# Patient Record
Sex: MALE | Race: WHITE | NOT HISPANIC OR LATINO | Employment: OTHER | URBAN - NONMETROPOLITAN AREA
[De-identification: names, ages, dates, MRNs, and addresses within clinical notes are randomized per-mention and may not be internally consistent; named-entity substitution may affect disease eponyms.]

---

## 2018-03-28 ENCOUNTER — PROCEDURE VISIT (OUTPATIENT)
Dept: OTOLARYNGOLOGY | Facility: CLINIC | Age: 73
End: 2018-03-28

## 2018-03-28 ENCOUNTER — OFFICE VISIT (OUTPATIENT)
Dept: OTOLARYNGOLOGY | Facility: CLINIC | Age: 73
End: 2018-03-28

## 2018-03-28 VITALS
HEIGHT: 74 IN | WEIGHT: 212 LBS | TEMPERATURE: 98.8 F | HEART RATE: 76 BPM | SYSTOLIC BLOOD PRESSURE: 150 MMHG | DIASTOLIC BLOOD PRESSURE: 82 MMHG | BODY MASS INDEX: 27.21 KG/M2

## 2018-03-28 DIAGNOSIS — H81.10 BPPV (BENIGN PAROXYSMAL POSITIONAL VERTIGO), UNSPECIFIED LATERALITY: ICD-10-CM

## 2018-03-28 DIAGNOSIS — H93.13 TINNITUS OF BOTH EARS: Primary | ICD-10-CM

## 2018-03-28 DIAGNOSIS — H90.3 SENSORINEURAL HEARING LOSS (SNHL) OF BOTH EARS: ICD-10-CM

## 2018-03-28 DIAGNOSIS — R42 INTERMITTENT VERTIGO: ICD-10-CM

## 2018-03-28 DIAGNOSIS — H93.13 TINNITUS OF BOTH EARS: ICD-10-CM

## 2018-03-28 DIAGNOSIS — H90.3 SENSORINEURAL HEARING LOSS (SNHL) OF BOTH EARS: Primary | ICD-10-CM

## 2018-03-28 PROCEDURE — 99203 OFFICE O/P NEW LOW 30 MIN: CPT | Performed by: NURSE PRACTITIONER

## 2018-03-28 PROCEDURE — 92625 TINNITUS ASSESSMENT: CPT | Performed by: AUDIOLOGIST-HEARING AID FITTER

## 2018-03-28 NOTE — PROGRESS NOTES
YOB: 1945  Location: [facility]  Location Address: [address] [place] [address], [facility], [address] [place] [address]  Location Phone: [phone]    Chief Complaint   Patient presents with   • Balance Issues       History of Present Illness  Alfie Joseph is a 73 y.o. male.  Alfie Joseph is here for evaluation of ENT complaints. The patient has had problems with tinnitus  The symptoms are not localized to a particular location. The patient has had severe symptoms. The symptoms have been present for the last several years The symptoms are aggravated by  no identifiable factors. The symptoms are improved by no identifiable factors.  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  He has excessive high pitched tinnitus.  He is positive for hearing loss but not interfering with his ADLS at this time.  He drinks some caffeine throughout the day.  He had a recent vertigo that lasted for about a week (a few months ago) and resolved. The vertigo was stimulated by bending over.  He denied roaring or fullness in his ear.      Procedure visit     3/28/2018  [facility]   BECKIE Green   Audiology   Sensorineural hearing loss (SNHL) of both ears +2 more   Dx   Tinnitus, Hearing Loss, Dizziness; Referred by Clifford Weston MD   Reason for Visit    Progress Notes                     Past Medical History:   Diagnosis Date   • Acquired hypothyroidism    • Acute bronchitis    • Allergic rhinitis    • Aortic valve disorder     Aortic valve disorder - AVR   • Aortic valve sclerosis    • Benign prostatic hyperplasia    • Coronary arteriosclerosis    • Coronary atherosclerosis of native coronary artery     Coronary atherosclerosis of native coronary artery - CABG   • Diabetes    • Exanthematous disorder    • GERD (gastroesophageal reflux disease)    • Hemorrhoids     Hemorrhoids - internal & external   • History of echocardiogram 10/26/2015    Echocardiogram W/  color flow 86122 (2) - Limited 2D echocardiogram. Normal prosthetic AV.   • History of echocardiogram 12/09/2013    Echocardiogram W/O color flow 33043 (3) - Mildly depressed LV systolic function with EF of 45-50%. Moderate CLVH. Grade I diastolic dysfunction of the left ventricular myocardium. No evidence of pericardial effusion.   • Hyperlipidemia    • Hypothyroidism    • Infection of skin and subcutaneous tissue     Infection of skin AND/OR subcutaneous tissue   • Tinnitus    • Type 2 diabetes mellitus    • Urinary tract infectious disease        Past Surgical History:   Procedure Laterality Date   • CARDIAC CATHETERIZATION  10/23/2013    Cardiac cath 88122 (1) - Multivessel coronary artery disease. Moderate AV stenosis. Mild aortic valve regurgitation.   • CORONARY ARTERY BYPASS GRAFT     • ENDOSCOPY  01/25/2010    Colon endoscopy 49300 (1) - internal external hemorrhoids. Otherwise normal   • GALLBLADDER SURGERY     • HYDROCELECTOMY      Remove hydrocele (1)   • INJECTION OF MEDICATION  05/21/2012    Kenalog (1) - ELLI PEPE ([facility] [employer])       Outpatient Prescriptions Marked as Taking for the 3/28/18 encounter (Office Visit) with MELISSA Rojas   Medication Sig Dispense Refill   • ACETAMINOPHEN  mg 2 (Two) Times a Day.     • aspirin 81 MG tablet Take 81 mg by mouth Daily.     • atorvastatin (LIPITOR) 40 MG tablet Take 40 mg by mouth Daily.     • glipiZIDE (GLIPIZIDE XL) 5 MG ER tablet Take 1 tablet by mouth 2 (Two) Times a Day. 180 tablet 3   • levothyroxine (SYNTHROID) 125 MCG tablet Take  by mouth Daily.     • metFORMIN (GLUCOPHAGE) 850 MG tablet      • Multiple Vitamins-Minerals (MULTIVITAMIN PO) Med Name: multivitamin tablet     • raNITIdine (ZANTAC) 150 MG tablet Take 1 tablet by mouth 2 (Two) Times a Day. 180 tablet 3   • Saw Palmetto, Serenoa repens, (SAW PALMETTO PO)          Review of patient's allergies indicates no known allergies.    Family History   Problem Relation Age of Onset   •  Breast cancer Mother    • Diabetes Sister    • Leukemia Brother    • Diabetes Brother    • Diabetes Sister        Social History     Social History   • Marital status:      Spouse name: N/A   • Number of children: N/A   • Years of education: N/A     Occupational History   • Not on file.     Social History Main Topics   • Smoking status: Former Smoker   • Smokeless tobacco: Former User   • Alcohol use Yes      Comment: social   • Drug use: No   • Sexual activity: Defer      Comment:      Other Topics Concern   • Not on file     Social History Narrative   • No narrative on file       Review of Systems   Constitutional: Negative.    HENT:        SEE HPI   Eyes: Negative.    Respiratory: Negative.    Cardiovascular: Negative.    Gastrointestinal: Negative.    Endocrine: Negative.    Genitourinary: Negative.    Musculoskeletal: Negative.    Skin: Negative.    Allergic/Immunologic: Negative.    Neurological: Negative.    Hematological: Negative.    Psychiatric/Behavioral: Negative.        Vitals:    03/28/18 1503   BP: 150/82   Pulse: 76   Temp: 98.8 °F (37.1 °C)       Body mass index is 27.22 kg/m².    Objective     Physical Exam  CONSTITUTIONAL: well nourished, alert, oriented, in no acute distress     COMMUNICATION AND VOICE: able to communicate normally, normal voice quality    HEAD: normocephalic, no lesions, atraumatic, no tenderness, no masses     FACE: appearance normal, no lesions, no tenderness, no deformities, facial motion symmetric    SALIVARY GLANDS: parotid glands with no tenderness, no swelling, no masses, submandibular glands with normal size, nontender    EYES: ocular motility normal, eyelids normal, orbits normal, no proptosis, conjunctiva normal , pupils equal, round     EARS:  Hearing: response to conversational voice normal bilaterally   External Ears: auricles without lesions  Otoscopic: tympanic membrane appearance normal, no lesions, no perforation, normal mobility, no  fluid    NOSE:  External Nose: structure normal, no tenderness on palpation, no nasal discharge, no lesions, no evidence of trauma, nostrils patent     ORAL:  Lips: upper and lower lips without lesion   Teeth: dentition within normal limits for age   Gums: gingivae healthy   Oral Mucosa: oral mucosa normal, no mucosal lesions   Floor of Mouth: Warthin’s duct patent, mucosa normal  Tongue: lingual mucosa normal without lesions, normal tongue mobility   Palate: soft and hard palates with normal mucosa and structure  Oropharynx: oropharyngeal mucosa normal    NECK: neck appearance normal, no mass,  noted without erythema or tenderness    LYMPH NODES: no lymphadenopathy    CHEST/RESPIRATORY: respiratory effort normal     CARDIOVASCULAR: extremities without cyanosis or edema      NEUROLOGIC/PSYCHIATRIC: oriented to time, place and person, mood normal, affect appropriate, CN II-XII intact grossly    Assessment/Plan   Alfie was seen today for balance issues.    Diagnoses and all orders for this visit:    Tinnitus of both ears    Sensorineural hearing loss (SNHL) of both ears    BPPV (benign paroxysmal positional vertigo), unspecified laterality      * Surgery not found *  No orders of the defined types were placed in this encounter.    Return if symptoms worsen or fail to improve.       Patient Instructions   Call for problems or worsening symptoms    Avoid loud noise exposure. Protect hearing in with extreme loud noise.   Recommend fan, sound machine, white noise from TV for tinnitus masking. Avoid excessive caffeine, decrease stress level, monitor BP regularly, routine sleep schedule. Low Sodium Diet.   Tinnitus Handout   Hearing loss handout   Appt with hearing aid specialist for hearing aid fitting when patient so desires.   Call for change or worsening symptoms not controlled by current treatment regimen.

## 2018-03-28 NOTE — PATIENT INSTRUCTIONS
(1) See the medical provider as scheduled due to findings and patient complaints.  (2) Receive audiological testing as needed.

## 2021-02-03 ENCOUNTER — HOSPITAL ENCOUNTER (INPATIENT)
Facility: HOSPITAL | Age: 76
LOS: 9 days | Discharge: NURSING, REHAB OR LONG-TERM CARE | End: 2021-02-12
Attending: FAMILY MEDICINE | Admitting: HOSPITALIST

## 2021-02-03 ENCOUNTER — HOSPITAL ENCOUNTER (OUTPATIENT)
Dept: INFUSION THERAPY | Facility: HOSPITAL | Age: 76
Discharge: HOME | End: 2021-02-03

## 2021-02-03 ENCOUNTER — APPOINTMENT (OUTPATIENT)
Dept: GENERAL RADIOLOGY | Facility: HOSPITAL | Age: 76
End: 2021-02-03

## 2021-02-03 DIAGNOSIS — Z78.9 IMPAIRED MOBILITY AND ACTIVITIES OF DAILY LIVING: ICD-10-CM

## 2021-02-03 DIAGNOSIS — J96.01 ACUTE RESPIRATORY FAILURE WITH HYPOXIA (HCC): ICD-10-CM

## 2021-02-03 DIAGNOSIS — J12.82 PNEUMONIA DUE TO COVID-19 VIRUS: Primary | ICD-10-CM

## 2021-02-03 DIAGNOSIS — Z74.09 IMPAIRED MOBILITY AND ACTIVITIES OF DAILY LIVING: ICD-10-CM

## 2021-02-03 DIAGNOSIS — U07.1 PNEUMONIA DUE TO COVID-19 VIRUS: Primary | ICD-10-CM

## 2021-02-03 DIAGNOSIS — Z74.09 IMPAIRED FUNCTIONAL MOBILITY, BALANCE, GAIT, AND ENDURANCE: ICD-10-CM

## 2021-02-03 DIAGNOSIS — U07.1 COVID-19: Primary | ICD-10-CM

## 2021-02-03 PROBLEM — J96.00 ACUTE RESPIRATORY FAILURE: Status: ACTIVE | Noted: 2021-02-03

## 2021-02-03 LAB
ALBUMIN SERPL-MCNC: 3.5 G/DL (ref 3.5–5.2)
ALBUMIN SERPL-MCNC: 3.6 G/DL (ref 3.5–5.2)
ALBUMIN/GLOB SERPL: 0.9 G/DL
ALP SERPL-CCNC: 81 U/L (ref 39–117)
ALP SERPL-CCNC: 84 U/L (ref 39–117)
ALT SERPL W P-5'-P-CCNC: 36 U/L (ref 1–41)
ALT SERPL W P-5'-P-CCNC: 37 U/L (ref 1–41)
ANION GAP SERPL CALCULATED.3IONS-SCNC: 12 MMOL/L (ref 5–15)
ARTERIAL PATENCY WRIST A: POSITIVE
AST SERPL-CCNC: 61 U/L (ref 1–40)
AST SERPL-CCNC: 63 U/L (ref 1–40)
ATMOSPHERIC PRESS: 749 MMHG
BACTERIA UR QL AUTO: ABNORMAL /HPF
BASE EXCESS BLDA CALC-SCNC: 0.3 MMOL/L (ref 0–2)
BASOPHILS # BLD AUTO: 0.02 10*3/MM3 (ref 0–0.2)
BASOPHILS NFR BLD AUTO: 0.2 % (ref 0–1.5)
BDY SITE: ABNORMAL
BILIRUB CONJ SERPL-MCNC: 0.2 MG/DL (ref 0–0.3)
BILIRUB INDIRECT SERPL-MCNC: 0.5 MG/DL
BILIRUB SERPL-MCNC: 0.7 MG/DL (ref 0–1.2)
BILIRUB SERPL-MCNC: 0.7 MG/DL (ref 0–1.2)
BILIRUB UR QL STRIP: NEGATIVE
BUN SERPL-MCNC: 18 MG/DL (ref 8–23)
BUN/CREAT SERPL: 23.4 (ref 7–25)
CALCIUM SPEC-SCNC: 9.9 MG/DL (ref 8.6–10.5)
CHLORIDE SERPL-SCNC: 96 MMOL/L (ref 98–107)
CK SERPL-CCNC: 127 U/L (ref 20–200)
CLARITY UR: CLEAR
CO2 SERPL-SCNC: 26 MMOL/L (ref 22–29)
COLOR UR: YELLOW
CREAT SERPL-MCNC: 0.77 MG/DL (ref 0.76–1.27)
CREAT SERPL-MCNC: 0.81 MG/DL (ref 0.76–1.27)
D-DIMER, QUANTITATIVE (MAD,POW, STR): 2509 NG/ML (FEU) (ref 0–470)
DEPRECATED RDW RBC AUTO: 39.6 FL (ref 37–54)
EOSINOPHIL # BLD AUTO: 0.01 10*3/MM3 (ref 0–0.4)
EOSINOPHIL NFR BLD AUTO: 0.1 % (ref 0.3–6.2)
ERYTHROCYTE [DISTWIDTH] IN BLOOD BY AUTOMATED COUNT: 12.4 % (ref 12.3–15.4)
FERRITIN SERPL-MCNC: 1687 NG/ML (ref 30–400)
GFR SERPL CREATININE-BSD FRML MDRD: 93 ML/MIN/1.73
GFR SERPL CREATININE-BSD FRML MDRD: 99 ML/MIN/1.73
GLOBULIN UR ELPH-MCNC: 4.2 GM/DL
GLUCOSE BLDC GLUCOMTR-MCNC: 290 MG/DL (ref 70–130)
GLUCOSE SERPL-MCNC: 309 MG/DL (ref 65–99)
GLUCOSE UR STRIP-MCNC: ABNORMAL MG/DL
HCO3 BLDA-SCNC: 23.7 MMOL/L (ref 20–26)
HCT VFR BLD AUTO: 44.2 % (ref 37.5–51)
HGB BLD-MCNC: 16.3 G/DL (ref 13–17.7)
HGB UR QL STRIP.AUTO: ABNORMAL
HOLD SPECIMEN: NORMAL
HYALINE CASTS UR QL AUTO: ABNORMAL /LPF
IMM GRANULOCYTES # BLD AUTO: 0.08 10*3/MM3 (ref 0–0.05)
IMM GRANULOCYTES NFR BLD AUTO: 0.8 % (ref 0–0.5)
KETONES UR QL STRIP: ABNORMAL
LDH SERPL-CCNC: 429 U/L (ref 135–225)
LEUKOCYTE ESTERASE UR QL STRIP.AUTO: NEGATIVE
LYMPHOCYTES # BLD AUTO: 0.6 10*3/MM3 (ref 0.7–3.1)
LYMPHOCYTES NFR BLD AUTO: 6.2 % (ref 19.6–45.3)
Lab: ABNORMAL
MAGNESIUM SERPL-MCNC: 2.1 MG/DL (ref 1.6–2.4)
MCH RBC QN AUTO: 32.6 PG (ref 26.6–33)
MCHC RBC AUTO-ENTMCNC: 36.9 G/DL (ref 31.5–35.7)
MCV RBC AUTO: 88.4 FL (ref 79–97)
MODALITY: ABNORMAL
MONOCYTES # BLD AUTO: 0.51 10*3/MM3 (ref 0.1–0.9)
MONOCYTES NFR BLD AUTO: 5.3 % (ref 5–12)
NEUTROPHILS NFR BLD AUTO: 8.48 10*3/MM3 (ref 1.7–7)
NEUTROPHILS NFR BLD AUTO: 87.4 % (ref 42.7–76)
NITRITE UR QL STRIP: NEGATIVE
NRBC BLD AUTO-RTO: 0 /100 WBC (ref 0–0.2)
NT-PROBNP SERPL-MCNC: 1852 PG/ML (ref 0–900)
PCO2 BLDA: 34.5 MM HG (ref 35–45)
PH BLDA: 7.45 PH UNITS (ref 7.35–7.45)
PH UR STRIP.AUTO: 6 [PH] (ref 5–9)
PLATELET # BLD AUTO: 378 10*3/MM3 (ref 140–450)
PMV BLD AUTO: 9.8 FL (ref 6–12)
PO2 BLDA: 75 MM HG (ref 83–108)
POTASSIUM SERPL-SCNC: 3.7 MMOL/L (ref 3.5–5.2)
PROCALCITONIN SERPL-MCNC: 0.2 NG/ML (ref 0–0.25)
PROT SERPL-MCNC: 7.8 G/DL (ref 6–8.5)
PROT SERPL-MCNC: 7.9 G/DL (ref 6–8.5)
PROT UR QL STRIP: ABNORMAL
RBC # BLD AUTO: 5 10*6/MM3 (ref 4.14–5.8)
RBC # UR: ABNORMAL /HPF
REF LAB TEST METHOD: ABNORMAL
SAO2 % BLDCOA: 95.2 % (ref 94–99)
SODIUM SERPL-SCNC: 134 MMOL/L (ref 136–145)
SP GR UR STRIP: 1.03 (ref 1–1.03)
SQUAMOUS #/AREA URNS HPF: ABNORMAL /HPF
TROPONIN T SERPL-MCNC: <0.01 NG/ML (ref 0–0.03)
UROBILINOGEN UR QL STRIP: ABNORMAL
VENTILATOR MODE: ABNORMAL
WBC # BLD AUTO: 9.7 10*3/MM3 (ref 3.4–10.8)
WBC UR QL AUTO: ABNORMAL /HPF
WHOLE BLOOD HOLD SPECIMEN: NORMAL
WHOLE BLOOD HOLD SPECIMEN: NORMAL

## 2021-02-03 PROCEDURE — 93005 ELECTROCARDIOGRAM TRACING: CPT | Performed by: FAMILY MEDICINE

## 2021-02-03 PROCEDURE — 94760 N-INVAS EAR/PLS OXIMETRY 1: CPT

## 2021-02-03 PROCEDURE — 80076 HEPATIC FUNCTION PANEL: CPT | Performed by: HOSPITALIST

## 2021-02-03 PROCEDURE — 99285 EMERGENCY DEPT VISIT HI MDM: CPT

## 2021-02-03 PROCEDURE — 83735 ASSAY OF MAGNESIUM: CPT | Performed by: FAMILY MEDICINE

## 2021-02-03 PROCEDURE — 94799 UNLISTED PULMONARY SVC/PX: CPT

## 2021-02-03 PROCEDURE — 81001 URINALYSIS AUTO W/SCOPE: CPT | Performed by: FAMILY MEDICINE

## 2021-02-03 PROCEDURE — 84145 PROCALCITONIN (PCT): CPT | Performed by: FAMILY MEDICINE

## 2021-02-03 PROCEDURE — 85379 FIBRIN DEGRADATION QUANT: CPT | Performed by: FAMILY MEDICINE

## 2021-02-03 PROCEDURE — 83880 ASSAY OF NATRIURETIC PEPTIDE: CPT | Performed by: FAMILY MEDICINE

## 2021-02-03 PROCEDURE — 82565 ASSAY OF CREATININE: CPT | Performed by: HOSPITALIST

## 2021-02-03 PROCEDURE — 82728 ASSAY OF FERRITIN: CPT | Performed by: HOSPITALIST

## 2021-02-03 PROCEDURE — 82962 GLUCOSE BLOOD TEST: CPT

## 2021-02-03 PROCEDURE — 36600 WITHDRAWAL OF ARTERIAL BLOOD: CPT

## 2021-02-03 PROCEDURE — 82803 BLOOD GASES ANY COMBINATION: CPT

## 2021-02-03 PROCEDURE — 83615 LACTATE (LD) (LDH) ENZYME: CPT | Performed by: HOSPITALIST

## 2021-02-03 PROCEDURE — XW033E5 INTRODUCTION OF REMDESIVIR ANTI-INFECTIVE INTO PERIPHERAL VEIN, PERCUTANEOUS APPROACH, NEW TECHNOLOGY GROUP 5: ICD-10-PCS | Performed by: HOSPITALIST

## 2021-02-03 PROCEDURE — 85025 COMPLETE CBC W/AUTO DIFF WBC: CPT | Performed by: FAMILY MEDICINE

## 2021-02-03 PROCEDURE — 87040 BLOOD CULTURE FOR BACTERIA: CPT | Performed by: FAMILY MEDICINE

## 2021-02-03 PROCEDURE — 80053 COMPREHEN METABOLIC PANEL: CPT | Performed by: FAMILY MEDICINE

## 2021-02-03 PROCEDURE — 25010000002 DEXAMETHASONE PER 1 MG: Performed by: FAMILY MEDICINE

## 2021-02-03 PROCEDURE — 93010 ELECTROCARDIOGRAM REPORT: CPT | Performed by: INTERNAL MEDICINE

## 2021-02-03 PROCEDURE — 71045 X-RAY EXAM CHEST 1 VIEW: CPT

## 2021-02-03 PROCEDURE — 82550 ASSAY OF CK (CPK): CPT | Performed by: FAMILY MEDICINE

## 2021-02-03 PROCEDURE — 84484 ASSAY OF TROPONIN QUANT: CPT | Performed by: FAMILY MEDICINE

## 2021-02-03 PROCEDURE — 25010000002 ENOXAPARIN PER 10 MG: Performed by: HOSPITALIST

## 2021-02-03 RX ORDER — LORATADINE 10 MG/1
10 TABLET ORAL DAILY
COMMUNITY
End: 2021-02-12 | Stop reason: HOSPADM

## 2021-02-03 RX ORDER — CETIRIZINE HYDROCHLORIDE 10 MG/1
10 TABLET ORAL DAILY
Status: DISCONTINUED | OUTPATIENT
Start: 2021-02-04 | End: 2021-02-12 | Stop reason: HOSPADM

## 2021-02-03 RX ORDER — EPINEPHRINE 1 MG/ML
0.3 INJECTION, SOLUTION INTRAMUSCULAR; SUBCUTANEOUS AS NEEDED
Status: DISCONTINUED | OUTPATIENT
Start: 2021-02-03 | End: 2021-02-03

## 2021-02-03 RX ORDER — METHYLPREDNISOLONE SODIUM SUCCINATE 125 MG/2ML
125 INJECTION, POWDER, LYOPHILIZED, FOR SOLUTION INTRAMUSCULAR; INTRAVENOUS AS NEEDED
Status: DISCONTINUED | OUTPATIENT
Start: 2021-02-03 | End: 2021-02-03

## 2021-02-03 RX ORDER — DIPHENHYDRAMINE HYDROCHLORIDE 50 MG/ML
50 INJECTION INTRAMUSCULAR; INTRAVENOUS AS NEEDED
Status: DISCONTINUED | OUTPATIENT
Start: 2021-02-03 | End: 2021-02-03

## 2021-02-03 RX ORDER — ATORVASTATIN CALCIUM 40 MG/1
40 TABLET, FILM COATED ORAL DAILY
Status: DISCONTINUED | OUTPATIENT
Start: 2021-02-04 | End: 2021-02-12 | Stop reason: HOSPADM

## 2021-02-03 RX ORDER — NICOTINE POLACRILEX 4 MG
15 LOZENGE BUCCAL
Status: DISCONTINUED | OUTPATIENT
Start: 2021-02-03 | End: 2021-02-12 | Stop reason: HOSPADM

## 2021-02-03 RX ORDER — PANTOPRAZOLE SODIUM 40 MG/1
40 TABLET, DELAYED RELEASE ORAL EVERY MORNING
Status: DISCONTINUED | OUTPATIENT
Start: 2021-02-04 | End: 2021-02-12 | Stop reason: HOSPADM

## 2021-02-03 RX ORDER — SODIUM CHLORIDE 0.9 % (FLUSH) 0.9 %
10 SYRINGE (ML) INJECTION AS NEEDED
Status: DISCONTINUED | OUTPATIENT
Start: 2021-02-03 | End: 2021-02-12 | Stop reason: HOSPADM

## 2021-02-03 RX ORDER — ACETAMINOPHEN 500 MG
500 TABLET ORAL EVERY 8 HOURS PRN
Status: DISCONTINUED | OUTPATIENT
Start: 2021-02-03 | End: 2021-02-12 | Stop reason: HOSPADM

## 2021-02-03 RX ORDER — TAMSULOSIN HYDROCHLORIDE 0.4 MG/1
1 CAPSULE ORAL DAILY
COMMUNITY
End: 2021-02-12 | Stop reason: HOSPADM

## 2021-02-03 RX ORDER — ASCORBIC ACID 500 MG
1000 TABLET ORAL DAILY
Status: DISCONTINUED | OUTPATIENT
Start: 2021-02-04 | End: 2021-02-12 | Stop reason: HOSPADM

## 2021-02-03 RX ORDER — OMEPRAZOLE 20 MG/1
20 CAPSULE, DELAYED RELEASE ORAL DAILY
COMMUNITY
End: 2021-02-12 | Stop reason: HOSPADM

## 2021-02-03 RX ORDER — SODIUM CHLORIDE 9 MG/ML
250 INJECTION, SOLUTION INTRAVENOUS ONCE
Status: DISCONTINUED | OUTPATIENT
Start: 2021-02-03 | End: 2021-02-03

## 2021-02-03 RX ORDER — ALOGLIPTIN 25 MG/1
25 TABLET, FILM COATED ORAL DAILY
COMMUNITY
End: 2021-02-12 | Stop reason: HOSPADM

## 2021-02-03 RX ORDER — DEXTROSE MONOHYDRATE 25 G/50ML
25 INJECTION, SOLUTION INTRAVENOUS
Status: DISCONTINUED | OUTPATIENT
Start: 2021-02-03 | End: 2021-02-12 | Stop reason: HOSPADM

## 2021-02-03 RX ORDER — METHYLPREDNISOLONE SODIUM SUCCINATE 125 MG/2ML
125 INJECTION, POWDER, LYOPHILIZED, FOR SOLUTION INTRAMUSCULAR; INTRAVENOUS AS NEEDED
Status: CANCELLED | OUTPATIENT
Start: 2021-02-03

## 2021-02-03 RX ORDER — DIPHENOXYLATE HYDROCHLORIDE AND ATROPINE SULFATE 2.5; .025 MG/1; MG/1
1 TABLET ORAL DAILY
Status: DISCONTINUED | OUTPATIENT
Start: 2021-02-04 | End: 2021-02-12 | Stop reason: HOSPADM

## 2021-02-03 RX ORDER — SODIUM CHLORIDE 9 MG/ML
250 INJECTION, SOLUTION INTRAVENOUS ONCE
Status: CANCELLED | OUTPATIENT
Start: 2021-02-03

## 2021-02-03 RX ORDER — DIPHENHYDRAMINE HYDROCHLORIDE 50 MG/ML
50 INJECTION INTRAMUSCULAR; INTRAVENOUS AS NEEDED
Status: CANCELLED | OUTPATIENT
Start: 2021-02-03

## 2021-02-03 RX ORDER — TAMSULOSIN HYDROCHLORIDE 0.4 MG/1
0.4 CAPSULE ORAL DAILY
Status: DISCONTINUED | OUTPATIENT
Start: 2021-02-04 | End: 2021-02-12 | Stop reason: HOSPADM

## 2021-02-03 RX ORDER — SODIUM CHLORIDE 9 MG/ML
50 INJECTION, SOLUTION INTRAVENOUS CONTINUOUS
Status: DISCONTINUED | OUTPATIENT
Start: 2021-02-03 | End: 2021-02-12 | Stop reason: HOSPADM

## 2021-02-03 RX ORDER — MULTIVIT-MIN/IRON/FOLIC ACID/K 18-600-40
1000 CAPSULE ORAL DAILY
COMMUNITY
End: 2021-02-12 | Stop reason: HOSPADM

## 2021-02-03 RX ORDER — SODIUM CHLORIDE 9 MG/ML
125 INJECTION, SOLUTION INTRAVENOUS CONTINUOUS
Status: DISCONTINUED | OUTPATIENT
Start: 2021-02-03 | End: 2021-02-03

## 2021-02-03 RX ORDER — ASPIRIN 81 MG/1
81 TABLET ORAL DAILY
Status: DISCONTINUED | OUTPATIENT
Start: 2021-02-04 | End: 2021-02-12 | Stop reason: HOSPADM

## 2021-02-03 RX ORDER — DEXAMETHASONE SODIUM PHOSPHATE 4 MG/ML
6 INJECTION, SOLUTION INTRA-ARTICULAR; INTRALESIONAL; INTRAMUSCULAR; INTRAVENOUS; SOFT TISSUE DAILY
Status: DISCONTINUED | OUTPATIENT
Start: 2021-02-04 | End: 2021-02-06

## 2021-02-03 RX ORDER — LEVOTHYROXINE SODIUM 0.12 MG/1
125 TABLET ORAL
Status: DISCONTINUED | OUTPATIENT
Start: 2021-02-04 | End: 2021-02-12 | Stop reason: HOSPADM

## 2021-02-03 RX ORDER — EPINEPHRINE 1 MG/ML
0.3 INJECTION, SOLUTION INTRAMUSCULAR; SUBCUTANEOUS AS NEEDED
Status: CANCELLED | OUTPATIENT
Start: 2021-02-03

## 2021-02-03 RX ORDER — DEXAMETHASONE SODIUM PHOSPHATE 4 MG/ML
6 INJECTION, SOLUTION INTRA-ARTICULAR; INTRALESIONAL; INTRAMUSCULAR; INTRAVENOUS; SOFT TISSUE EVERY 6 HOURS
Status: DISCONTINUED | OUTPATIENT
Start: 2021-02-03 | End: 2021-02-03

## 2021-02-03 RX ADMIN — REMDESIVIR 200 MG: 100 INJECTION, POWDER, LYOPHILIZED, FOR SOLUTION INTRAVENOUS at 23:10

## 2021-02-03 RX ADMIN — SODIUM CHLORIDE 125 ML/HR: 9 INJECTION, SOLUTION INTRAVENOUS at 10:45

## 2021-02-03 RX ADMIN — SODIUM CHLORIDE 125 ML/HR: 9 INJECTION, SOLUTION INTRAVENOUS at 12:44

## 2021-02-03 RX ADMIN — ENOXAPARIN SODIUM 40 MG: 40 INJECTION SUBCUTANEOUS at 23:09

## 2021-02-03 RX ADMIN — DEXAMETHASONE SODIUM PHOSPHATE 6 MG: 4 INJECTION, SOLUTION INTRAMUSCULAR; INTRAVENOUS at 10:46

## 2021-02-03 RX ADMIN — SODIUM CHLORIDE 125 ML/HR: 9 INJECTION, SOLUTION INTRAVENOUS at 20:17

## 2021-02-03 RX ADMIN — DEXAMETHASONE SODIUM PHOSPHATE 6 MG: 4 INJECTION, SOLUTION INTRAMUSCULAR; INTRAVENOUS at 15:37

## 2021-02-03 NOTE — ED NOTES
Pt's admitting orders changing per ,  Pt needing a higher level of care.     Yaneli Modi, RN  02/03/21 1215

## 2021-02-03 NOTE — ED NOTES
Introduced myself to pt. Pt is comfortable, asked for blanket. No other needs.      Yaneli Modi, RN  02/03/21 1057

## 2021-02-03 NOTE — H&P
[facility] Admission      Date of Admission: 2/3/2021      Primary Care Physician: Clifford Weston MD      Chief Complaint: Shortness of breath    HPI: Patient is a 75-year-old male past medical history of hypothyroidism, coronary artery disease, and diabetes who presented to the emergency department with approximately 1 week of worsening symptoms.  He states that he has been getting increasingly short of breath over the last several days.  Normally he is able to do pretty much whatever he wants without getting winded, and now he can walk across the room without having to take a break.  He has had fever and fatigue.  He denies nausea, vomiting, diarrhea, and loss of taste or smell.    Concurrent Medical History:  has a past medical history of Acquired hypothyroidism, Acute bronchitis, Allergic rhinitis, Aortic valve disorder, Aortic valve sclerosis, Benign prostatic hyperplasia, Coronary arteriosclerosis, Coronary atherosclerosis of native coronary artery, Diabetes (CMS/[facility]), Exanthematous disorder, GERD (gastroesophageal reflux disease), Hemorrhoids, History of echocardiogram (10/26/2015), History of echocardiogram (12/09/2013), Hyperlipidemia, Hypothyroidism, Infection of skin and subcutaneous tissue, Tinnitus, Type 2 diabetes mellitus (CMS/[facility]), and Urinary tract infectious disease.    Past Surgical History:  has a past surgical history that includes Cardiac catheterization (10/23/2013); Esophagogastroduodenoscopy (01/25/2010); Injection of Medication (05/21/2012); Hydrocelectomy; Coronary artery bypass graft; and Gallbladder surgery.    Family History: family history includes Breast cancer in his mother; Diabetes in his brother, sister, and sister; Leukemia in his brother.     Social History:  reports that he has quit smoking. He has quit using smokeless tobacco. He reports current alcohol use. He reports that he does not use drugs.    Allergies: No Known  Allergies    Medications:   Prior to Admission medications    Medication Sig Start Date End Date Taking? Authorizing Provider   Dexamethasone (DECADRON DOSE PACK PO) Take  by mouth.   Yes Rhona Pennington MD   loratadine (Loradamed) 10 MG tablet Take 10 mg by mouth Daily.   Yes Rhona Pennington MD   tamsulosin (FLOMAX) 0.4 MG capsule 24 hr capsule Take 1 capsule by mouth Daily.   Yes Rhona Pennington MD   ACETAMINOPHEN  mg 2 (Two) Times a Day.    Rhona Pennington MD   aspirin 81 MG tablet Take 81 mg by mouth Daily.    Rhona Pennington MD   atorvastatin (LIPITOR) 40 MG tablet Take 40 mg by mouth Daily.    Emergency, Nurse Epic, RN   glipiZIDE (GLIPIZIDE XL) 5 MG ER tablet Take 1 tablet by mouth 2 (Two) Times a Day. 1/9/17   Alfie Weston MD   levothyroxine (SYNTHROID) 125 MCG tablet Take  by mouth Daily. 10/25/13   Rhona Pennington MD   metFORMIN (GLUCOPHAGE) 850 MG tablet     Rhona Pennington MD   Multiple Vitamins-Minerals (MULTIVITAMIN PO) Med Name: multivitamin tablet    Rhona Pennington MD   raNITIdine (ZANTAC) 150 MG tablet Take 1 tablet by mouth 2 (Two) Times a Day. 1/9/17   Alfie Weston MD Saw Palmetto, Serenoa repens, (SAW PALMETTO PO)     Rhona Pennington MD       Review of Systems:  Review of Systems   Constitutional: Positive for activity change, fatigue and fever. Negative for appetite change, chills and unexpected weight change.   HENT: Negative for congestion, facial swelling, hearing loss, nosebleeds, rhinorrhea, sneezing, trouble swallowing and voice change.    Eyes: Negative for photophobia and visual disturbance.   Respiratory: Positive for cough and shortness of breath. Negative for apnea, choking, chest tightness, wheezing and stridor.    Cardiovascular: Negative for chest pain, palpitations and leg swelling.   Gastrointestinal: Negative for abdominal pain, blood in stool, constipation, diarrhea, nausea and vomiting.   Endocrine:  Negative for cold intolerance, heat intolerance, polydipsia, polyphagia and polyuria.   Genitourinary: Negative for dysuria, flank pain and hematuria.   Musculoskeletal: Negative for arthralgias, back pain, myalgias and neck pain.   Skin: Negative for rash and wound.   Allergic/Immunologic: Negative for immunocompromised state.   Neurological: Negative for dizziness, seizures, syncope, speech difficulty, weakness, light-headedness, numbness and headaches.   Hematological: Does not bruise/bleed easily.   Psychiatric/Behavioral: Negative for agitation, behavioral problems, confusion, decreased concentration, hallucinations, self-injury and suicidal ideas. The patient is not nervous/anxious.       Otherwise complete ROS is negative except as mentioned above.    Physical Exam:   Temp:  [97.9 °F (36.6 °C)] 97.9 °F (36.6 °C)  Heart Rate:  [68-85] 74  Resp:  [14-27] 14  BP: (140-217)/() 143/78  Physical Exam  Constitutional:       Appearance: He is well-developed.   HENT:      Head: Normocephalic and atraumatic.      Nose: Nose normal.   Eyes:      General: Lids are normal. No scleral icterus.     Conjunctiva/sclera: Conjunctivae normal.      Pupils: Pupils are equal, round, and reactive to light.   Neck:      Musculoskeletal: Normal range of motion and neck supple. Normal range of motion. No edema, neck rigidity or spinous process tenderness.      Vascular: No JVD.      Trachea: No tracheal tenderness or tracheal deviation.   Cardiovascular:      Rate and Rhythm: Normal rate and regular rhythm.      Pulses: Normal pulses.      Heart sounds: Normal heart sounds, S1 normal and S2 normal. No murmur. No friction rub. No gallop.    Pulmonary:      Effort: Pulmonary effort is normal. No accessory muscle usage or respiratory distress.      Breath sounds: Decreased breath sounds present. No wheezing or rales.      Comments: Appears comfortable on nonrebreather  Chest:      Chest wall: No tenderness.   Abdominal:       General: Bowel sounds are normal. There is no distension.      Palpations: Abdomen is soft. There is no mass.      Tenderness: There is no abdominal tenderness. There is no guarding or rebound.   Musculoskeletal:         General: No tenderness.   Skin:     General: Skin is warm.      Coloration: Skin is not pale.      Findings: No rash.   Neurological:      Mental Status: He is alert and oriented to person, place, and time.      Cranial Nerves: No cranial nerve deficit.      Sensory: No sensory deficit.      Motor: No atrophy, abnormal muscle tone or seizure activity.      Coordination: Coordination normal.      Deep Tendon Reflexes: Reflexes are normal and symmetric. Reflexes normal.   Psychiatric:         Behavior: Behavior normal.         Thought Content: Thought content normal.         Judgment: Judgment normal.     Results Reviewed:  I have personally reviewed current lab, radiology, and data and agree with results.  Lab Results (last 24 hours)     Procedure Component Value Units Date/Time    Blood Culture - Blood, Arm, Left [[id number]] Collected: 02/03/21 1345    Specimen: Blood from Arm, Left Updated: 02/03/21 1441    Blood Culture - Blood, Arm, Right [[id number]] Collected: 02/03/21 1431    Specimen: Blood from Arm, Right Updated: 02/03/21 1441    Extra Tubes [[id number]] Collected: 02/03/21 0944    Specimen: Blood, Venous Line Updated: 02/03/21 1345    Narrative:      The following orders were created for panel order Extra Tubes.  Procedure                               Abnormality         Status                     ---------                               -----------         ------                     Campuzano Top[[id number]]                                         Final result                 Please view results for these tests on the individual orders.    Campuzano Top [[id number]] Collected: 02/03/21 0944    Specimen: Blood Updated: 02/03/21 1345     Extra Tube Hold for add-ons.     Comment: Auto resulted.        "D-dimer, Quantitative [[id number]]  (Abnormal) Collected: 02/03/21 0944    Specimen: Blood Updated: 02/03/21 1209     D-Dimer, Quantitative 2,509 ng/mL (FEU)     Narrative:      Dimer values <500 ng/ml FEU are FDA approved as aid in diagnosis of deep venous thrombosis and pulmonary embolism.  This test should not be used in an exclusion strategy with pretest probability alone.    A recent guideline regarding diagnosis for pulmonary thromboembolism recommends an adjusted exclusion criterion of age x 10 ng/ml FEU for patients >50 years of age ([employer] Intern Med 2015; 163: 701-711).      Procalcitonin [[id number]]  (Normal) Collected: 02/03/21 0944    Specimen: Blood Updated: 02/03/21 1149     Procalcitonin 0.20 ng/mL     Narrative:      As a Marker for Sepsis (Non-Neonates):   1. <0.5 ng/mL represents a low risk of severe sepsis and/or septic shock.  1. >2 ng/mL represents a high risk of severe sepsis and/or septic shock.    As a Marker for Lower Respiratory Tract Infections that require antibiotic therapy:  PCT on Admission     Antibiotic Therapy             6-12 Hrs later  > 0.5                Strongly Recommended            >0.25 - <0.5         Recommended  0.1 - 0.25           Discouraged                   Remeasure/reassess PCT  <0.1                 Strongly Discouraged          Remeasure/reassess PCT      As 28 day mortality risk marker: \"Change in Procalcitonin Result\" (> 80 % or <=80 %) if Day 0 (or Day 1) and Day 4 values are available. Refer to http://www.[facility]-pct-calculator.com/   Change in PCT <=80 %   A decrease of PCT levels below or equal to 80 % defines a positive change in PCT test result representing a higher risk for 28-day all-cause mortality of patients diagnosed with severe sepsis or septic shock.  Change in PCT > 80 %   A decrease of PCT levels of more than 80 % defines a negative change in PCT result representing a lower risk for 28-day all-cause mortality of patients diagnosed with severe sepsis " or septic shock.                Results may be falsely decreased if patient taking Biotin.     Urinalysis With Culture If Indicated - Urine, Clean Catch [[id number]]  (Abnormal) Collected: 02/03/21 1106    Specimen: Urine, Clean Catch Updated: 02/03/21 1125     Color, UA Yellow     Appearance, UA Clear     pH, UA 6.0     Specific Gravity, UA 1.034     Comment: Result obtained by Refractometer        Glucose, UA >=1000 mg/dL (3+)     Ketones, UA 15 mg/dL (1+)     Bilirubin, UA Negative     Blood, UA Trace     Protein, UA 30 mg/dL (1+)     Leuk Esterase, UA Negative     Nitrite, UA Negative     Urobilinogen, UA 1.0 E.U./dL    Urinalysis, Microscopic Only - Urine, Clean Catch [[id number]]  (Abnormal) Collected: 02/03/21 1106    Specimen: Urine, Clean Catch Updated: 02/03/21 1125     RBC, UA 3-5 /HPF      WBC, UA 3-5 /HPF      Bacteria, UA None Seen /HPF      Squamous Epithelial Cells, UA None Seen /HPF      Hyaline Casts, UA 3-6 /LPF      Methodology Automated Microscopy    [place] Draw [[id number]] Collected: 02/03/21 0944    Specimen: Blood Updated: 02/03/21 1045    Narrative:      The following orders were created for panel order [place] Draw.  Procedure                               Abnormality         Status                     ---------                               -----------         ------                     Light Blue Top[[id number]]                                   Final result               Green Top (Gel)[[id number]]                                  Final result               Lavender Top[[id number]]                                     Final result               Gold Top - SST[[id number]]                                   Final result                 Please view results for these tests on the individual orders.    Light Blue Top [[id number]] Collected: 02/03/21 0944    Specimen: Blood Updated: 02/03/21 1045     Extra Tube hold for add-on     Comment: Auto resulted       Green Top (Gel) [[id number]] Collected:  02/03/21 0944    Specimen: Blood Updated: 02/03/21 1045     Extra Tube Hold for add-ons.     Comment: Auto resulted.       Lavender Top [[id number]] Collected: 02/03/21 0944    Specimen: Blood Updated: 02/03/21 1045     Extra Tube hold for add-on     Comment: Auto resulted       Gold Top - SST [[id number]] Collected: 02/03/21 0944    Specimen: Blood Updated: 02/03/21 1045     Extra Tube Hold for add-ons.     Comment: Auto resulted.       CK [[id number]]  (Normal) Collected: 02/03/21 0944    Specimen: Blood Updated: 02/03/21 1044     Creatine Kinase 127 U/L     Magnesium [[id number]]  (Normal) Collected: 02/03/21 0944    Specimen: Blood Updated: 02/03/21 1044     Magnesium 2.1 mg/dL     Comprehensive Metabolic Panel [[id number]]  (Abnormal) Collected: 02/03/21 0944    Specimen: Blood Updated: 02/03/21 1019     Glucose 309 mg/dL      BUN 18 mg/dL      Creatinine 0.77 mg/dL      Sodium 134 mmol/L      Potassium 3.7 mmol/L      Chloride 96 mmol/L      CO2 26.0 mmol/L      Calcium 9.9 mg/dL      Total Protein 7.8 g/dL      Albumin 3.60 g/dL      ALT (SGPT) 37 U/L      AST (SGOT) 63 U/L      Alkaline Phosphatase 84 U/L      Total Bilirubin 0.7 mg/dL      eGFR Non African Amer 99 mL/min/1.73      Globulin 4.2 gm/dL      A/G Ratio 0.9 g/dL      BUN/Creatinine Ratio 23.4     Anion Gap 12.0 mmol/L     Narrative:      GFR Normal >60  Chronic Kidney Disease <60  Kidney Failure <15      Troponin [[id number]]  (Normal) Collected: 02/03/21 0944    Specimen: Blood Updated: 02/03/21 1019     Troponin T <0.010 ng/mL     Narrative:      Troponin T Reference Range:  <= 0.03 ng/mL-   Negative for AMI  >0.03 ng/mL-     Abnormal for myocardial necrosis.  Clinicians would have to utilize clinical acumen, EKG, Troponin and serial changes to determine if it is an Acute Myocardial Infarction or myocardial injury due to an underlying chronic condition.       Results may be falsely decreased if patient taking Biotin.      BNP [[id number]]  (Abnormal)  Collected: 02/03/21 0944    Specimen: Blood Updated: 02/03/21 1016     proBNP 1,852.0 pg/mL     Narrative:      Among patients with dyspnea, NT-proBNP is highly sensitive for the detection of acute congestive heart failure. In addition NT-proBNP of <300 pg/ml effectively rules out acute congestive heart failure with 99% negative predictive value.    Results may be falsely decreased if patient taking Biotin.      CBC & Differential [[id number]]  (Abnormal) Collected: 02/03/21 0944    Specimen: Blood Updated: 02/03/21 0958    Narrative:      The following orders were created for panel order CBC & Differential.  Procedure                               Abnormality         Status                     ---------                               -----------         ------                     CBC Auto Differential[[id number]]        Abnormal            Final result                 Please view results for these tests on the individual orders.    CBC Auto Differential [[id number]]  (Abnormal) Collected: 02/03/21 0944    Specimen: Blood Updated: 02/03/21 0958     WBC 9.70 10*3/mm3      RBC 5.00 10*6/mm3      Hemoglobin 16.3 g/dL      Hematocrit 44.2 %      MCV 88.4 fL      MCH 32.6 pg      MCHC 36.9 g/dL      RDW 12.4 %      RDW-SD 39.6 fl      MPV 9.8 fL      Platelets 378 10*3/mm3      Neutrophil % 87.4 %      Lymphocyte % 6.2 %      Monocyte % 5.3 %      Eosinophil % 0.1 %      Basophil % 0.2 %      Immature Grans % 0.8 %      Neutrophils, Absolute 8.48 10*3/mm3      Lymphocytes, Absolute 0.60 10*3/mm3      Monocytes, Absolute 0.51 10*3/mm3      Eosinophils, Absolute 0.01 10*3/mm3      Basophils, Absolute 0.02 10*3/mm3      Immature Grans, Absolute 0.08 10*3/mm3      nRBC 0.0 /100 WBC         Imaging Results (Last 24 Hours)     Procedure Component Value Units Date/Time    XR Chest AP [[id number]] Collected: 02/03/21 0956     Updated: 02/03/21 1017    Narrative:      EXAM DESCRIPTION:     XR CHEST AP    CLINICAL HISTORY:     75 years   Male  [facility] Triage Protocol    COMPARISON:     September 27, 2017    TECHNIQUE:     One view-AP portable radiograph of the chest    FINDINGS:     There are vague bilateral peripheral infiltrates noted  predominantly involving the lower lobes. No dense areas of  alveolar consolidation are seen. These findings are typical for  Covid 19/viral pneumonia. Clinical correlation with appropriate  testing is recommended.    There is evidence prior median sternotomy. The cardiac silhouette  and pulmonary vasculature are within normal limits. There are no  definitive pleural effusions.      Impression:          1. Bilateral vague peripheral infiltrates as above.    Commonly reported imaging features of COVID-19 pneumonia are  present. Other processes such as influenza pneumonia and  organizing pneumonia, as can be seen with drug toxicity and  connective tissue disease, can cause similar imaging pattern.  [PneTyp]        Electronically signed by:  Neisha Call MD  2/3/2021 10:16 AM  CST Workstation: [id number]            Assessment:    Active Hospital Problems    Diagnosis   • Pneumonia due to COVID-19 virus             Plan:  1.  Acute hypoxic respiratory failure: Secondary to COVID-19.  Currently patient has been up titrated to nonrebreather.  Remains borderline hypoxic.  Will adjust oxygen as needed.  2.  COVID-19 pneumonia: Symptoms have been ongoing for approximately 1 week.  Will place on dexamethasone and remdesivir.  Will follow inflammatory labs.  3.  Coronary artery disease: Status post coronary artery bypass grafting.  No overt ischemic symptoms.  Continue home medications.  4.  Diabetes mellitus: Hold oral medication.  Place on sliding scale insulin.  5.  Hypothyroidism: Continue Synthroid.  6.  DVT prophylaxis: Lovenox.    I discussed the patient's findings and my recommendations with: Patient        This document has been electronically signed by Prashant Luna MD on February 3, 2021 14:57 CST

## 2021-02-03 NOTE — ED NOTES
"In pt's room to check oxygen saturation bandaid/\"sticky\" O2 probe and waveform.  Removed right index finger \"O2 probe\" with remaining left index finger probe.  Provided urinal for pt.  This rn gathered pt belongings to place in a belongings bag, one blue Promethean t-shirt, one blue Promethean zip hoodie sweatshirt, one brown carhartt jersy (canvas) jacket and one cap.  As pt stood at bedside to urinate, pt's oxygen saturation at 84% on NRB.       Lyndsey Cantu, RN  02/03/21 5352    "

## 2021-02-03 NOTE — ED PROVIDER NOTES
Subjective   During physical exam, patient was noted to have an O2 saturation rate consistently at 85%.  He was placed on oxygen with improvement.      Shortness of Breath  Severity:  Moderate  Onset quality:  Gradual  Duration:  1 week  Timing:  Constant  Progression:  Worsening  Chronicity:  New  Context: activity    Relieved by:  Rest  Worsened by:  Exertion  Associated symptoms: no abdominal pain, no chest pain, no cough, no diaphoresis, no ear pain, no fever, no headaches, no neck pain, no rash, no sore throat, no vomiting and no wheezing    Risk factors: no tobacco use        Review of Systems   Constitutional: Positive for activity change and fatigue. Negative for appetite change, chills, diaphoresis and fever.   HENT: Positive for congestion. Negative for ear discharge, ear pain, nosebleeds, rhinorrhea, sinus pressure, sore throat and trouble swallowing.    Eyes: Negative for discharge and redness.   Respiratory: Positive for shortness of breath. Negative for apnea, cough, chest tightness and wheezing.    Cardiovascular: Negative for chest pain.   Gastrointestinal: Negative for abdominal pain, diarrhea, nausea and vomiting.   Endocrine: Negative for polyuria.   Genitourinary: Negative for dysuria, frequency and urgency.   Musculoskeletal: Negative for myalgias and neck pain.   Skin: Negative for color change and rash.   Allergic/Immunologic: Negative for immunocompromised state.   Neurological: Positive for weakness. Negative for dizziness, seizures, syncope, light-headedness and headaches.   Hematological: Negative for adenopathy. Does not bruise/bleed easily.   Psychiatric/Behavioral: Negative for behavioral problems and confusion.   All other systems reviewed and are negative.      Past Medical History:   Diagnosis Date   • Acquired hypothyroidism    • Acute bronchitis    • Allergic rhinitis    • Aortic valve disorder     Aortic valve disorder - AVR   • Aortic valve sclerosis    • Benign prostatic  hyperplasia    • Coronary arteriosclerosis    • Coronary atherosclerosis of native coronary artery     Coronary atherosclerosis of native coronary artery - CABG   • Diabetes (CMS/HCC)    • Exanthematous disorder    • GERD (gastroesophageal reflux disease)    • Hemorrhoids     Hemorrhoids - internal & external   • History of echocardiogram 10/26/2015    Echocardiogram W/ color flow 95684 (2) - Limited 2D echocardiogram. Normal prosthetic AV.   • History of echocardiogram 12/09/2013    Echocardiogram W/O color flow 35492 (3) - Mildly depressed LV systolic function with EF of 45-50%. Moderate CLVH. Grade I diastolic dysfunction of the left ventricular myocardium. No evidence of pericardial effusion.   • Hyperlipidemia    • Hypothyroidism    • Infection of skin and subcutaneous tissue     Infection of skin AND/OR subcutaneous tissue   • Tinnitus    • Type 2 diabetes mellitus (CMS/HCC)    • Urinary tract infectious disease        No Known Allergies    Past Surgical History:   Procedure Laterality Date   • CARDIAC CATHETERIZATION  10/23/2013    Cardiac cath 87139 (1) - Multivessel coronary artery disease. Moderate AV stenosis. Mild aortic valve regurgitation.   • CORONARY ARTERY BYPASS GRAFT     • ENDOSCOPY  01/25/2010    Colon endoscopy 70494 (1) - internal external hemorrhoids. Otherwise normal   • GALLBLADDER SURGERY     • HYDROCELECTOMY      Remove hydrocele (1)   • INJECTION OF MEDICATION  05/21/2012    Kenalog (1) - ELLI PEPE ([facility] [employer])       Family History   Problem Relation Age of Onset   • Breast cancer Mother    • Diabetes Sister    • Leukemia Brother    • Diabetes Brother    • Diabetes Sister        Social History     Socioeconomic History   • Marital status:      Spouse name: Not on file   • Number of children: Not on file   • Years of education: Not on file   • Highest education level: Not on file   Tobacco Use   • Smoking status: Former Smoker   • Smokeless tobacco: Former User   Substance and  Sexual Activity   • Alcohol use: Yes     Comment: social   • Drug use: No   • Sexual activity: Defer     Comment:            Objective   Physical Exam  Vitals signs and nursing note reviewed.   Constitutional:       Appearance: He is well-developed.   HENT:      Head: Normocephalic and atraumatic.      Nose: Nose normal.   Eyes:      General: No scleral icterus.        Right eye: No discharge.         Left eye: No discharge.      Conjunctiva/sclera: Conjunctivae normal.      Pupils: Pupils are equal, round, and reactive to light.   Neck:      Musculoskeletal: Normal range of motion and neck supple.      Trachea: No tracheal deviation.   Cardiovascular:      Rate and Rhythm: Normal rate and regular rhythm.      Heart sounds: Normal heart sounds. No murmur.   Pulmonary:      Effort: Pulmonary effort is normal. No respiratory distress.      Breath sounds: No stridor. Decreased breath sounds present. No wheezing or rales.   Abdominal:      General: Bowel sounds are normal. There is no distension.      Palpations: Abdomen is soft. There is no mass.      Tenderness: There is no abdominal tenderness. There is no guarding or rebound.   Skin:     General: Skin is warm and dry.      Findings: No erythema or rash.   Neurological:      Mental Status: He is alert and oriented to person, place, and time.      Coordination: Coordination normal.   Psychiatric:         Behavior: Behavior normal.         Thought Content: Thought content normal.         ECG 12 Lead      Date/Time: 2/3/2021 1:45 PM  Performed by: Carroll Arguello MD  Authorized by: Carroll Arguello MD   Interpreted by physician  Rhythm: sinus rhythm  Rate: normal  BPM: 84  Conduction: complete LBBB  ST Segments: ST segments normal                   ED Course              Labs Reviewed   COMPREHENSIVE METABOLIC PANEL - Abnormal; Notable for the following components:       Result Value    Glucose 309 (*)     Sodium 134 (*)     Chloride 96 (*)     AST  (SGOT) 63 (*)     All other components within normal limits    Narrative:     GFR Normal >60  Chronic Kidney Disease <60  Kidney Failure <15     BNP (IN-HOUSE) - Abnormal; Notable for the following components:    proBNP 1,852.0 (*)     All other components within normal limits    Narrative:     Among patients with dyspnea, NT-proBNP is highly sensitive for the detection of acute congestive heart failure. In addition NT-proBNP of <300 pg/ml effectively rules out acute congestive heart failure with 99% negative predictive value.    Results may be falsely decreased if patient taking Biotin.     CBC WITH AUTO DIFFERENTIAL - Abnormal; Notable for the following components:    MCHC 36.9 (*)     Neutrophil % 87.4 (*)     Lymphocyte % 6.2 (*)     Eosinophil % 0.1 (*)     Immature Grans % 0.8 (*)     Neutrophils, Absolute 8.48 (*)     Lymphocytes, Absolute 0.60 (*)     Immature Grans, Absolute 0.08 (*)     All other components within normal limits   URINALYSIS W/ CULTURE IF INDICATED - Abnormal; Notable for the following components:    Specific Gravity, UA 1.034 (*)     Glucose, UA >=1000 mg/dL (3+) (*)     Ketones, UA 15 mg/dL (1+) (*)     Blood, UA Trace (*)     Protein, UA 30 mg/dL (1+) (*)     All other components within normal limits   D-DIMER, QUANTITATIVE - Abnormal; Notable for the following components:    D-Dimer, Quantitative 2,509 (*)     All other components within normal limits    Narrative:     Dimer values <500 ng/ml FEU are FDA approved as aid in diagnosis of deep venous thrombosis and pulmonary embolism.  This test should not be used in an exclusion strategy with pretest probability alone.    A recent guideline regarding diagnosis for pulmonary thromboembolism recommends an adjusted exclusion criterion of age x 10 ng/ml FEU for patients >50 years of age ([employer]yna Intern Med 2015; 163: 701-711).     URINALYSIS, MICROSCOPIC ONLY - Abnormal; Notable for the following components:    RBC, UA 3-5 (*)     All other  "components within normal limits   TROPONIN (IN-HOUSE) - Normal    Narrative:     Troponin T Reference Range:  <= 0.03 ng/mL-   Negative for AMI  >0.03 ng/mL-     Abnormal for myocardial necrosis.  Clinicians would have to utilize clinical acumen, EKG, Troponin and serial changes to determine if it is an Acute Myocardial Infarction or myocardial injury due to an underlying chronic condition.       Results may be falsely decreased if patient taking Biotin.     PROCALCITONIN - Normal    Narrative:     As a Marker for Sepsis (Non-Neonates):   1. <0.5 ng/mL represents a low risk of severe sepsis and/or septic shock.  1. >2 ng/mL represents a high risk of severe sepsis and/or septic shock.    As a Marker for Lower Respiratory Tract Infections that require antibiotic therapy:  PCT on Admission     Antibiotic Therapy             6-12 Hrs later  > 0.5                Strongly Recommended            >0.25 - <0.5         Recommended  0.1 - 0.25           Discouraged                   Remeasure/reassess PCT  <0.1                 Strongly Discouraged          Remeasure/reassess PCT      As 28 day mortality risk marker: \"Change in Procalcitonin Result\" (> 80 % or <=80 %) if Day 0 (or Day 1) and Day 4 values are available. Refer to http://www.[employer]-pct-calculator.com/   Change in PCT <=80 %   A decrease of PCT levels below or equal to 80 % defines a positive change in PCT test result representing a higher risk for 28-day all-cause mortality of patients diagnosed with severe sepsis or septic shock.  Change in PCT > 80 %   A decrease of PCT levels of more than 80 % defines a negative change in PCT result representing a lower risk for 28-day all-cause mortality of patients diagnosed with severe sepsis or septic shock.                Results may be falsely decreased if patient taking Biotin.    CK - Normal   MAGNESIUM - Normal   BLOOD CULTURE   BLOOD CULTURE   BLOOD CULTURE   RAINBOW DRAW    Narrative:     The following orders were " created for panel order [place] Draw.  Procedure                               Abnormality         Status                     ---------                               -----------         ------                     Light Blue Top[[id number]]                                   Final result               Green Top (Gel)[[id number]]                                  Final result               Lavender Top[[id number]]                                     Final result               Gold Top - SST[[id number]]                                   Final result                 Please view results for these tests on the individual orders.   CBC AND DIFFERENTIAL    Narrative:     The following orders were created for panel order CBC & Differential.  Procedure                               Abnormality         Status                     ---------                               -----------         ------                     CBC Auto Differential[[id number]]        Abnormal            Final result                 Please view results for these tests on the individual orders.   LIGHT BLUE TOP   GREEN TOP   LAVENDER TOP   GOLD TOP - SST   EXTRA TUBES    Narrative:     The following orders were created for panel order Extra Tubes.  Procedure                               Abnormality         Status                     ---------                               -----------         ------                     Campuzano Top[[id number]]                                         Final result                 Please view results for these tests on the individual orders.   GRAY TOP       XR Chest AP   Final Result         1. Bilateral vague peripheral infiltrates as above.      Commonly reported imaging features of COVID-19 pneumonia are   present. Other processes such as influenza pneumonia and   organizing pneumonia, as can be seen with drug toxicity and   connective tissue disease, can cause similar imaging pattern.   [PneTyp]            Electronically signed by:   Neisha Call MD  2/3/2021 10:16 AM   [facility] Workstation: [id number]                                          [facility]    Final diagnoses:   Pneumonia due to COVID-19 virus   Acute respiratory failure with hypoxia (CMS/HCC)            Carroll Arguello MD  02/03/21 8593

## 2021-02-04 ENCOUNTER — ANESTHESIA EVENT (OUTPATIENT)
Dept: ICU | Facility: HOSPITAL | Age: 76
End: 2021-02-04

## 2021-02-04 ENCOUNTER — ANESTHESIA (OUTPATIENT)
Dept: ICU | Facility: HOSPITAL | Age: 76
End: 2021-02-04

## 2021-02-04 LAB
ALBUMIN SERPL-MCNC: 3.2 G/DL (ref 3.5–5.2)
ALBUMIN/GLOB SERPL: 0.9 G/DL
ALP SERPL-CCNC: 70 U/L (ref 39–117)
ALT SERPL W P-5'-P-CCNC: 33 U/L (ref 1–41)
ANION GAP SERPL CALCULATED.3IONS-SCNC: 10 MMOL/L (ref 5–15)
ARTERIAL PATENCY WRIST A: POSITIVE
AST SERPL-CCNC: 37 U/L (ref 1–40)
ATMOSPHERIC PRESS: 737 MMHG
BASE EXCESS BLDA CALC-SCNC: 2.3 MMOL/L (ref 0–2)
BASOPHILS # BLD AUTO: 0.02 10*3/MM3 (ref 0–0.2)
BASOPHILS NFR BLD AUTO: 0.2 % (ref 0–1.5)
BDY SITE: ABNORMAL
BILIRUB CONJ SERPL-MCNC: 0.2 MG/DL (ref 0–0.3)
BILIRUB SERPL-MCNC: 0.4 MG/DL (ref 0–1.2)
BUN SERPL-MCNC: 22 MG/DL (ref 8–23)
BUN/CREAT SERPL: 27.8 (ref 7–25)
CALCIUM SPEC-SCNC: 9.5 MG/DL (ref 8.6–10.5)
CHLORIDE SERPL-SCNC: 101 MMOL/L (ref 98–107)
CK SERPL-CCNC: 66 U/L (ref 20–200)
CO2 SERPL-SCNC: 28 MMOL/L (ref 22–29)
CREAT SERPL-MCNC: 0.79 MG/DL (ref 0.76–1.27)
CRP SERPL-MCNC: 9.4 MG/DL (ref 0–0.5)
D-DIMER, QUANTITATIVE (MAD,POW, STR): 1953 NG/ML (FEU) (ref 0–470)
DEPRECATED RDW RBC AUTO: 40.7 FL (ref 37–54)
EOSINOPHIL # BLD AUTO: 0 10*3/MM3 (ref 0–0.4)
EOSINOPHIL NFR BLD AUTO: 0 % (ref 0.3–6.2)
ERYTHROCYTE [DISTWIDTH] IN BLOOD BY AUTOMATED COUNT: 12.4 % (ref 12.3–15.4)
FERRITIN SERPL-MCNC: 1501 NG/ML (ref 30–400)
FIBRINOGEN PPP-MCNC: 664 MG/DL (ref 228–514)
GAS FLOW AIRWAY: 15 LPM
GFR SERPL CREATININE-BSD FRML MDRD: 96 ML/MIN/1.73
GLOBULIN UR ELPH-MCNC: 3.7 GM/DL
GLUCOSE BLDC GLUCOMTR-MCNC: 283 MG/DL (ref 70–130)
GLUCOSE BLDC GLUCOMTR-MCNC: 305 MG/DL (ref 70–130)
GLUCOSE BLDC GLUCOMTR-MCNC: 397 MG/DL (ref 70–130)
GLUCOSE SERPL-MCNC: 290 MG/DL (ref 65–99)
HCO3 BLDA-SCNC: 25.6 MMOL/L (ref 20–26)
HCT VFR BLD AUTO: 44 % (ref 37.5–51)
HGB BLD-MCNC: 15.3 G/DL (ref 13–17.7)
IMM GRANULOCYTES # BLD AUTO: 0.12 10*3/MM3 (ref 0–0.05)
IMM GRANULOCYTES NFR BLD AUTO: 1.2 % (ref 0–0.5)
LDH SERPL-CCNC: 336 U/L (ref 135–225)
LYMPHOCYTES # BLD AUTO: 0.78 10*3/MM3 (ref 0.7–3.1)
LYMPHOCYTES NFR BLD AUTO: 7.9 % (ref 19.6–45.3)
Lab: ABNORMAL
MCH RBC QN AUTO: 31.5 PG (ref 26.6–33)
MCHC RBC AUTO-ENTMCNC: 34.8 G/DL (ref 31.5–35.7)
MCV RBC AUTO: 90.5 FL (ref 79–97)
MODALITY: ABNORMAL
MONOCYTES # BLD AUTO: 0.78 10*3/MM3 (ref 0.1–0.9)
MONOCYTES NFR BLD AUTO: 7.9 % (ref 5–12)
NEUTROPHILS NFR BLD AUTO: 8.22 10*3/MM3 (ref 1.7–7)
NEUTROPHILS NFR BLD AUTO: 82.8 % (ref 42.7–76)
NRBC BLD AUTO-RTO: 0 /100 WBC (ref 0–0.2)
PCO2 BLDA: 34.9 MM HG (ref 35–45)
PH BLDA: 7.47 PH UNITS (ref 7.35–7.45)
PLATELET # BLD AUTO: 425 10*3/MM3 (ref 140–450)
PMV BLD AUTO: 10 FL (ref 6–12)
PO2 BLDA: 64.3 MM HG (ref 83–108)
POTASSIUM SERPL-SCNC: 4.5 MMOL/L (ref 3.5–5.2)
PROT SERPL-MCNC: 6.9 G/DL (ref 6–8.5)
RBC # BLD AUTO: 4.86 10*6/MM3 (ref 4.14–5.8)
SAO2 % BLDCOA: 93 % (ref 94–99)
SODIUM SERPL-SCNC: 139 MMOL/L (ref 136–145)
VENTILATOR MODE: ABNORMAL
WBC # BLD AUTO: 9.92 10*3/MM3 (ref 3.4–10.8)

## 2021-02-04 PROCEDURE — 63710000001 INSULIN ASPART PER 5 UNITS: Performed by: HOSPITALIST

## 2021-02-04 PROCEDURE — 82728 ASSAY OF FERRITIN: CPT | Performed by: HOSPITALIST

## 2021-02-04 PROCEDURE — 82550 ASSAY OF CK (CPK): CPT | Performed by: HOSPITALIST

## 2021-02-04 PROCEDURE — 85384 FIBRINOGEN ACTIVITY: CPT | Performed by: HOSPITALIST

## 2021-02-04 PROCEDURE — 86140 C-REACTIVE PROTEIN: CPT | Performed by: HOSPITALIST

## 2021-02-04 PROCEDURE — 94799 UNLISTED PULMONARY SVC/PX: CPT

## 2021-02-04 PROCEDURE — 36600 WITHDRAWAL OF ARTERIAL BLOOD: CPT

## 2021-02-04 PROCEDURE — 25010000002 DEXAMETHASONE PER 1 MG: Performed by: HOSPITALIST

## 2021-02-04 PROCEDURE — 82962 GLUCOSE BLOOD TEST: CPT

## 2021-02-04 PROCEDURE — 83615 LACTATE (LD) (LDH) ENZYME: CPT | Performed by: HOSPITALIST

## 2021-02-04 PROCEDURE — XW0DXM6 INTRODUCTION OF BARICITINIB INTO MOUTH AND PHARYNX, EXTERNAL APPROACH, NEW TECHNOLOGY GROUP 6: ICD-10-PCS | Performed by: ANESTHESIOLOGY

## 2021-02-04 PROCEDURE — 85379 FIBRIN DEGRADATION QUANT: CPT | Performed by: HOSPITALIST

## 2021-02-04 PROCEDURE — 25010000002 ENOXAPARIN PER 10 MG: Performed by: HOSPITALIST

## 2021-02-04 PROCEDURE — 85025 COMPLETE CBC W/AUTO DIFF WBC: CPT | Performed by: HOSPITALIST

## 2021-02-04 PROCEDURE — 82803 BLOOD GASES ANY COMBINATION: CPT

## 2021-02-04 PROCEDURE — 82248 BILIRUBIN DIRECT: CPT | Performed by: HOSPITALIST

## 2021-02-04 PROCEDURE — 80053 COMPREHEN METABOLIC PANEL: CPT | Performed by: HOSPITALIST

## 2021-02-04 RX ADMIN — INSULIN ASPART 7 UNITS: 100 INJECTION, SOLUTION INTRAVENOUS; SUBCUTANEOUS at 06:31

## 2021-02-04 RX ADMIN — INSULIN ASPART 8 UNITS: 100 INJECTION, SOLUTION INTRAVENOUS; SUBCUTANEOUS at 18:02

## 2021-02-04 RX ADMIN — REMDESIVIR 100 MG: 100 INJECTION, POWDER, LYOPHILIZED, FOR SOLUTION INTRAVENOUS at 21:53

## 2021-02-04 RX ADMIN — ATORVASTATIN CALCIUM 40 MG: 40 TABLET, FILM COATED ORAL at 08:14

## 2021-02-04 RX ADMIN — LEVOTHYROXINE SODIUM 125 MCG: 125 TABLET ORAL at 06:20

## 2021-02-04 RX ADMIN — CETIRIZINE HYDROCHLORIDE 10 MG: 10 TABLET, FILM COATED ORAL at 08:14

## 2021-02-04 RX ADMIN — ENOXAPARIN SODIUM 40 MG: 40 INJECTION SUBCUTANEOUS at 21:44

## 2021-02-04 RX ADMIN — PANTOPRAZOLE SODIUM 40 MG: 40 TABLET, DELAYED RELEASE ORAL at 06:20

## 2021-02-04 RX ADMIN — OXYCODONE HYDROCHLORIDE AND ACETAMINOPHEN 1000 MG: 500 TABLET ORAL at 08:14

## 2021-02-04 RX ADMIN — ASPIRIN 81 MG: 81 TABLET, COATED ORAL at 08:14

## 2021-02-04 RX ADMIN — DEXAMETHASONE SODIUM PHOSPHATE 6 MG: 4 INJECTION, SOLUTION INTRAMUSCULAR; INTRAVENOUS at 08:14

## 2021-02-04 RX ADMIN — BARICITINIB 4 MG: 2 TABLET, FILM COATED ORAL at 10:25

## 2021-02-04 RX ADMIN — SODIUM CHLORIDE 50 ML/HR: 9 INJECTION, SOLUTION INTRAVENOUS at 18:22

## 2021-02-04 RX ADMIN — TAMSULOSIN HYDROCHLORIDE 0.4 MG: 0.4 CAPSULE ORAL at 08:14

## 2021-02-04 RX ADMIN — THERA TABS 1 TABLET: TAB at 08:14

## 2021-02-04 RX ADMIN — INSULIN ASPART 6 UNITS: 100 INJECTION, SOLUTION INTRAVENOUS; SUBCUTANEOUS at 12:01

## 2021-02-04 NOTE — PROGRESS NOTES
[facility]  INPATIENT PROGRESS NOTE    Length of Stay: 1  Date of Admission: 2/3/2021  Primary Care Physician: Clifford Weston MD    Subjective   Chief Complaint: Shortness of breath  HPI: Patient states he feels better today.  Breathing is easier.    Review of Systems   Constitutional: Positive for fatigue. Negative for appetite change, chills and fever.   Respiratory: Positive for cough and shortness of breath. Negative for chest tightness.    Cardiovascular: Negative for chest pain, palpitations and leg swelling.   Gastrointestinal: Negative for abdominal pain, constipation, diarrhea, nausea and vomiting.   Skin: Negative for wound.   Neurological: Negative for dizziness, weakness, light-headedness, numbness and headaches.        All pertinent negatives and positives are as above. All other systems have been reviewed and are negative unless otherwise stated.     Objective    Temp:  [97 °F (36.1 °C)] 97 °F (36.1 °C)  Heart Rate:  [64-84] 68  Resp:  [14-29] 18  BP: (119-217)/() 150/70    Physical Exam  Vitals signs reviewed.   Constitutional:       Appearance: He is well-developed.   HENT:      Head: Normocephalic and atraumatic.   Eyes:      Pupils: Pupils are equal, round, and reactive to light.   Neck:      Musculoskeletal: Normal range of motion and neck supple.   Cardiovascular:      Rate and Rhythm: Normal rate and regular rhythm.      Heart sounds: Normal heart sounds. No murmur. No friction rub. No gallop.    Pulmonary:      Effort: Pulmonary effort is normal. No respiratory distress.      Breath sounds: Decreased breath sounds present. No wheezing or rales.   Chest:      Chest wall: No tenderness.   Abdominal:      General: Bowel sounds are normal. There is no distension.      Palpations: Abdomen is soft.      Tenderness: There is no abdominal tenderness.   Psychiatric:         Behavior: Behavior normal.         Results Review:  I have  reviewed the labs, radiology results, and diagnostic studies.    Laboratory Data:   Results from last 7 days   Lab Units 02/04/21  0643 02/03/21  0944   SODIUM mmol/L 139 134*   POTASSIUM mmol/L 4.5 3.7   CHLORIDE mmol/L 101 96*   CO2 mmol/L 28.0 26.0   BUN mg/dL 22 18   CREATININE mg/dL 0.79 0.81  0.77   GLUCOSE mg/dL 290* 309*   CALCIUM mg/dL 9.5 9.9   BILIRUBIN mg/dL 0.4 0.7  0.7   ALK PHOS U/L 70 81  84   ALT (SGPT) U/L 33 36  37   AST (SGOT) U/L 37 61*  63*   ANION GAP mmol/L 10.0 12.0     Estimated Creatinine Clearance: 98 mL/min (by C-G formula based on SCr of 0.79 mg/dL).  Results from last 7 days   Lab Units 02/03/21  0944   MAGNESIUM mg/dL 2.1         Results from last 7 days   Lab Units 02/04/21  0643 02/03/21  0944   WBC 10*3/mm3 9.92 9.70   HEMOGLOBIN g/dL 15.3 16.3   HEMATOCRIT % 44.0 44.2   PLATELETS 10*3/mm3 425 378           Culture Data:   No results found for: BLOODCX  No results found for: URINECX  No results found for: RESPCX  No results found for: WOUNDCX  No results found for: STOOLCX  No components found for: BODYFLD    Radiology Data:   Imaging Results (Last 24 Hours)     ** No results found for the last 24 hours. **          I have reviewed the patient's current medications.     Assessment/Plan     Active Hospital Problems    Diagnosis   • Pneumonia due to COVID-19 virus   • Acute respiratory failure (CMS/[facility])       Plan:    1.  Acute hypoxic respiratory failure: Secondary to COVID-19.  Currently patient states he is feeling well on a nonrebreather.  Will attempt to wean as tolerated.    2.  COVID-19 pneumonia: Symptoms have been ongoing for approximately 1 week.    Continue on dexamethasone and remdesivir.    Inflammatory markers downtrending.    3.  Coronary artery disease: Status post coronary artery bypass grafting.  No overt ischemic symptoms.  Continue home medications.  4.  Diabetes mellitus: Hold oral medication.  Place on sliding scale insulin.  5.  Hypothyroidism: Continue  Synthroid.  6.  DVT prophylaxis: Lovenox.        The patient was evaluated during the global COVID-19 pandemic, and the diagnosis was suspected/considered upon their initial presentation.  Evaluation, treatment, and testing were consistent with current guidelines for patients who present with complaints or symptoms that may be related to COVID-19.    Discharge Planning: I expect patient to be discharged to home in 4-5 days.        This document has been electronically signed by Prashant Luna MD on February 4, 2021 12:57 CST

## 2021-02-04 NOTE — NURSING NOTE
VSS overnight, LBBB noted on admission, denies CP, sats in the mid 90s on nonrebreather, some SOA noted with exertion, productive cough, sliding scale insulin for hyperglycemia, AM labs and ABGs pending.

## 2021-02-04 NOTE — PROGRESS NOTES
Pt stable overnight with acceptable abg yesterday; he is awake and alert without distress this am on nrb mask; will recheck abg today and xray tomorrow.  He has received the bamlanivimab and is currently receiving the remdesivir and decadron.  Will assess the utility of adding the baricitinib for possible synergy with the remdesivir.  Wbc is normal and pt does not have significant secretions.  Since the pt is on more than six liters of high flow oxygen, will start the baricitinib.

## 2021-02-04 NOTE — SIGNIFICANT NOTE
Valuables including phone and wallet at bedside. Dentures at home. Denies home meds in belongings.

## 2021-02-04 NOTE — PROGRESS NOTES
The oxygen remains at about fifteen liters per minute.  The pt remains afebrile with a normal wbc along with improving inflammatory markers, crp and ferritin.  He is tolerating his breakfast while sitting up in chair this am.  Continue the baricitinib and the remdesivir and decadron. The xray this am is slightly worse than the one done two days ago.  Close monitoring of his respiratory status needed.

## 2021-02-05 ENCOUNTER — APPOINTMENT (OUTPATIENT)
Dept: GENERAL RADIOLOGY | Facility: HOSPITAL | Age: 76
End: 2021-02-05

## 2021-02-05 LAB
ALBUMIN SERPL-MCNC: 2.9 G/DL (ref 3.5–5.2)
ALBUMIN/GLOB SERPL: 0.8 G/DL
ALP SERPL-CCNC: 69 U/L (ref 39–117)
ALT SERPL W P-5'-P-CCNC: 37 U/L (ref 1–41)
ANION GAP SERPL CALCULATED.3IONS-SCNC: 11 MMOL/L (ref 5–15)
AST SERPL-CCNC: 47 U/L (ref 1–40)
BASOPHILS # BLD AUTO: 0.06 10*3/MM3 (ref 0–0.2)
BASOPHILS NFR BLD AUTO: 0.6 % (ref 0–1.5)
BILIRUB CONJ SERPL-MCNC: <0.2 MG/DL (ref 0–0.3)
BILIRUB SERPL-MCNC: 0.3 MG/DL (ref 0–1.2)
BUN SERPL-MCNC: 25 MG/DL (ref 8–23)
BUN/CREAT SERPL: 29.8 (ref 7–25)
CALCIUM SPEC-SCNC: 9.3 MG/DL (ref 8.6–10.5)
CHLORIDE SERPL-SCNC: 104 MMOL/L (ref 98–107)
CK SERPL-CCNC: 69 U/L (ref 20–200)
CO2 SERPL-SCNC: 26 MMOL/L (ref 22–29)
CREAT SERPL-MCNC: 0.84 MG/DL (ref 0.76–1.27)
CRP SERPL-MCNC: 4.82 MG/DL (ref 0–0.5)
DEPRECATED RDW RBC AUTO: 40 FL (ref 37–54)
EOSINOPHIL # BLD AUTO: 0.03 10*3/MM3 (ref 0–0.4)
EOSINOPHIL NFR BLD AUTO: 0.3 % (ref 0.3–6.2)
ERYTHROCYTE [DISTWIDTH] IN BLOOD BY AUTOMATED COUNT: 12.2 % (ref 12.3–15.4)
FERRITIN SERPL-MCNC: 1363 NG/ML (ref 30–400)
GFR SERPL CREATININE-BSD FRML MDRD: 89 ML/MIN/1.73
GLOBULIN UR ELPH-MCNC: 3.8 GM/DL
GLUCOSE BLDC GLUCOMTR-MCNC: 174 MG/DL (ref 70–130)
GLUCOSE BLDC GLUCOMTR-MCNC: 236 MG/DL (ref 70–130)
GLUCOSE BLDC GLUCOMTR-MCNC: 297 MG/DL (ref 70–130)
GLUCOSE BLDC GLUCOMTR-MCNC: 397 MG/DL (ref 70–130)
GLUCOSE SERPL-MCNC: 146 MG/DL (ref 65–99)
HCT VFR BLD AUTO: 42.2 % (ref 37.5–51)
HGB BLD-MCNC: 15.2 G/DL (ref 13–17.7)
IMM GRANULOCYTES # BLD AUTO: 0.19 10*3/MM3 (ref 0–0.05)
IMM GRANULOCYTES NFR BLD AUTO: 1.9 % (ref 0–0.5)
LYMPHOCYTES # BLD AUTO: 1.07 10*3/MM3 (ref 0.7–3.1)
LYMPHOCYTES NFR BLD AUTO: 10.5 % (ref 19.6–45.3)
MCH RBC QN AUTO: 32.2 PG (ref 26.6–33)
MCHC RBC AUTO-ENTMCNC: 36 G/DL (ref 31.5–35.7)
MCV RBC AUTO: 89.4 FL (ref 79–97)
MONOCYTES # BLD AUTO: 0.73 10*3/MM3 (ref 0.1–0.9)
MONOCYTES NFR BLD AUTO: 7.1 % (ref 5–12)
NEUTROPHILS NFR BLD AUTO: 79.6 % (ref 42.7–76)
NEUTROPHILS NFR BLD AUTO: 8.15 10*3/MM3 (ref 1.7–7)
NRBC BLD AUTO-RTO: 0 /100 WBC (ref 0–0.2)
PLATELET # BLD AUTO: 434 10*3/MM3 (ref 140–450)
PMV BLD AUTO: 9.3 FL (ref 6–12)
POTASSIUM SERPL-SCNC: 4.3 MMOL/L (ref 3.5–5.2)
PROT SERPL-MCNC: 6.7 G/DL (ref 6–8.5)
QT INTERVAL: 436 MS
QTC INTERVAL: 515 MS
RBC # BLD AUTO: 4.72 10*6/MM3 (ref 4.14–5.8)
SODIUM SERPL-SCNC: 141 MMOL/L (ref 136–145)
WBC # BLD AUTO: 10.23 10*3/MM3 (ref 3.4–10.8)

## 2021-02-05 PROCEDURE — 86140 C-REACTIVE PROTEIN: CPT | Performed by: HOSPITALIST

## 2021-02-05 PROCEDURE — 82728 ASSAY OF FERRITIN: CPT | Performed by: HOSPITALIST

## 2021-02-05 PROCEDURE — 63710000001 INSULIN ASPART PER 5 UNITS: Performed by: HOSPITALIST

## 2021-02-05 PROCEDURE — 82962 GLUCOSE BLOOD TEST: CPT

## 2021-02-05 PROCEDURE — 25010000002 DEXAMETHASONE PER 1 MG: Performed by: HOSPITALIST

## 2021-02-05 PROCEDURE — 71045 X-RAY EXAM CHEST 1 VIEW: CPT

## 2021-02-05 PROCEDURE — 80053 COMPREHEN METABOLIC PANEL: CPT | Performed by: HOSPITALIST

## 2021-02-05 PROCEDURE — 25010000002 ENOXAPARIN PER 10 MG: Performed by: HOSPITALIST

## 2021-02-05 PROCEDURE — 82248 BILIRUBIN DIRECT: CPT | Performed by: HOSPITALIST

## 2021-02-05 PROCEDURE — 85025 COMPLETE CBC W/AUTO DIFF WBC: CPT | Performed by: HOSPITALIST

## 2021-02-05 PROCEDURE — 82550 ASSAY OF CK (CPK): CPT | Performed by: HOSPITALIST

## 2021-02-05 RX ADMIN — ENOXAPARIN SODIUM 40 MG: 40 INJECTION SUBCUTANEOUS at 20:46

## 2021-02-05 RX ADMIN — LEVOTHYROXINE SODIUM 125 MCG: 125 TABLET ORAL at 07:08

## 2021-02-05 RX ADMIN — REMDESIVIR 100 MG: 100 INJECTION, POWDER, LYOPHILIZED, FOR SOLUTION INTRAVENOUS at 23:37

## 2021-02-05 RX ADMIN — PANTOPRAZOLE SODIUM 40 MG: 40 TABLET, DELAYED RELEASE ORAL at 07:08

## 2021-02-05 RX ADMIN — SODIUM CHLORIDE 50 ML/HR: 9 INJECTION, SOLUTION INTRAVENOUS at 22:18

## 2021-02-05 RX ADMIN — BARICITINIB 4 MG: 2 TABLET, FILM COATED ORAL at 08:28

## 2021-02-05 RX ADMIN — TAMSULOSIN HYDROCHLORIDE 0.4 MG: 0.4 CAPSULE ORAL at 08:27

## 2021-02-05 RX ADMIN — CETIRIZINE HYDROCHLORIDE 10 MG: 10 TABLET, FILM COATED ORAL at 08:28

## 2021-02-05 RX ADMIN — THERA TABS 1 TABLET: TAB at 08:27

## 2021-02-05 RX ADMIN — ATORVASTATIN CALCIUM 40 MG: 40 TABLET, FILM COATED ORAL at 08:27

## 2021-02-05 RX ADMIN — DEXAMETHASONE SODIUM PHOSPHATE 6 MG: 4 INJECTION, SOLUTION INTRAMUSCULAR; INTRAVENOUS at 08:27

## 2021-02-05 RX ADMIN — OXYCODONE HYDROCHLORIDE AND ACETAMINOPHEN 1000 MG: 500 TABLET ORAL at 08:27

## 2021-02-05 RX ADMIN — ASPIRIN 81 MG: 81 TABLET, COATED ORAL at 08:27

## 2021-02-05 RX ADMIN — INSULIN ASPART 6 UNITS: 100 INJECTION, SOLUTION INTRAVENOUS; SUBCUTANEOUS at 17:41

## 2021-02-05 RX ADMIN — INSULIN ASPART 2 UNITS: 100 INJECTION, SOLUTION INTRAVENOUS; SUBCUTANEOUS at 07:44

## 2021-02-05 RX ADMIN — INSULIN ASPART 8 UNITS: 100 INJECTION, SOLUTION INTRAVENOUS; SUBCUTANEOUS at 12:24

## 2021-02-05 NOTE — PLAN OF CARE
Goal Outcome Evaluation:  Plan of Care Reviewed With: caregiver  Progress: no change  Outcome Summary: Pt admitted with COVID 19 Pneumonia.  Currently requiring O2 supplementation along with Remedesivir and Decadron.  EAting well per staff.  Will monitor po intake and send extra meat on trays for added protein

## 2021-02-05 NOTE — PROGRESS NOTES
Discharge Planning Assessment  [facility]     Patient Name: Alfie Joseph  MRN: [id number]  Today's Date: 2/5/2021    Admit Date: 2/3/2021    Discharge Needs Assessment     Row Name 02/05/21 1311       Living Environment    Lives With  spouse    Current Living Arrangements  home/apartment/condo    Primary Care Provided by  self    Provides Primary Care For  no one    Family Caregiver if Needed  spouse    Quality of Family Relationships  supportive       Resource/Environmental Concerns    Resource/Environmental Concerns  none    Transportation Concerns  car, none       Transition Planning    Patient/Family Anticipates Transition to  home with family    Transportation Anticipated  car, drives self       Discharge Needs Assessment    Readmission Within the Last 30 Days  no previous admission in last 30 days    Equipment Currently Used at Home  glucometer;other (see comments) blood pressure cuff    Concerns to be Addressed  adjustment to diagnosis/illness    Equipment Needed After Discharge  none    Current Discharge Risk  chronically ill        Discharge Plan     Row Name 02/05/21 1314       Plan    Plan  pending hospital course    Plan Comments  Lace assessment completed with pts wife, Juanita Joseph, via phone. per Juanita pt was indpendent prior to admisson to the hospital. states he was on a fishing trip when he got sick. states he normally grows a large garden. monitors his blood glucose and blood pressure at home. CM verified demographics and preferred pharmacy. [facility] for any immediate rx needs [facility] pharmacy in [place]. per pts wife she is a retired RN and she plans to take care of him at d/c. CM advised of CM role and that CM will continue to follow for any d/c needs. she voiced understanding...Rosanne Scott RN    Row Name 02/05/21 3821       Plan    Plan Comments  continued stay review- covid +, pneuonina, iv remdesivir,,O2@15lpm high flow Nasal cannula.  decardon 6mg iv daily, DM-SSI, NS@50cc/hr. ferritin level 1,363, c-reactive 4.82...Rosanne Scott RN        Continued Care and Services - Admitted Since 2/3/2021    Coordination has not been started for this encounter.         Demographic Summary     Row Name 02/05/21 1310       General Information    Admission Type  inpatient    Arrived From  home    Required Notices Provided  Important Message from Medicare    Referral Source  high risk screening    Reason for Consult  discharge planning    Preferred Language  English     Used During This Interaction  no       Contact Information    Permission Granted to Share Info With          Functional Status     Row Name 02/05/21 1310       Functional Status    Usual Activity Tolerance  good       Functional Status, IADL    Medications  independent    Meal Preparation  independent    Housekeeping  independent    Laundry  independent    Shopping  independent       Mental Status Summary    Recent Changes in Mental Status/Cognitive Functioning  unable to assess       Employment/    Employment Status  retired        Psychosocial    No documentation.       Abuse/Neglect    No documentation.       Legal    No documentation.       Substance Abuse    No documentation.       Patient Forms    No documentation.           Rosanne Scott, RN

## 2021-02-05 NOTE — CONSULTS
Adult Nutrition  Assessment    Patient Name:  Alfie Joseph  YOB: 1945  MRN: [id number]  Admit Date:  2/3/2021    Assessment Date:  2/5/2021    Comments:  Pt admitted with COVID 19 Pneumonia.  Currently requiring O2 supplementation along with Remedesivir and Decadron.  Eating well per staff and po documentation is reports good intake.  Gluc elevated exacerbated by steroids.  RD will continue to monitor and add extra meat with meals and milk with breakfast.  Will monitor.     Reason for Assessment     Row Name 02/05/21 1156          Reason for Assessment    Reason For Assessment  nurse/nurse practitioner consult     Diagnosis  pulmonary disease     Identified At Risk by Screening Criteria  other (see comments) COVID 19 Positive         Nutrition/Diet History     Row Name 02/05/21 1156          Nutrition/Diet History    Typical Food/Fluid Intake  Pt eating well per staff.  Receiving O2 supplementation           Labs/Tests/Procedures/Meds     Row Name 02/05/21 1157          Labs/Procedures/Meds    Lab Results Reviewed  reviewed, pertinent     Lab Results Comments  Bun 25; Alb 2.90; Gluc 174        Diagnostic Tests/Procedures    Diagnostic Test/Procedure Reviewed  reviewed, pertinent     Diagnostic Test/Procedures Comments  O2 supplementation        Medications    Pertinent Medications Reviewed  reviewed, pertinent     Pertinent Medications Comments  REmdesivir; SSI; NaCl @ 50cc/hr; Decadron; VitC         Physical Findings     Row Name 02/05/21 1202          Physical Findings    Overall Physical Appearance  on oxygen therapy         Estimated/Assessed Needs     Row Name 02/05/21 1202          Calculation Measurements    Weight Used For Calculations  85.3 kg (188 lb)        Estimated/Assessed Needs    Additional Documentation  Additional Requirements (Group);Fluid Requirements (Group);[place]-St. Jeor Equation (Group);Protein Requirements (Group);Calorie Requirements (Group);KCAL/KG (Group)        Calorie  Requirements    Estimated Calorie Need Method  kcal/kg        KCAL/KG    KCAL/KG  25 Kcal/Kg (kcal)     25 Kcal/Kg (kcal)  2131.9        [place]-St. Jeor Equation    RMR ([place]-St. Jeor Equation)  1646.508        Protein Requirements    Weight Used For Protein Calculations  85.3 kg (188 lb)     Est Protein Requirement Amount (gms/kg)  1.2 gm protein     Estimated Protein Requirements (gms/day)  102.33        Fluid Requirements    Fluid Requirements (mL/day)  2130     Estimated Fluid Requirement Method  RDA Method     RDA Method (mL)  2130         Nutrition Prescription Ordered     Row Name 02/05/21 1204          Nutrition Prescription PO    Current PO Diet  Regular     Common Modifiers  Cardiac;Consistent Carbohydrate         Evaluation of Received Nutrient/Fluid Intake     Row Name 02/05/21 1204          PO Evaluation    Number of Days PO Intake Evaluated  Insufficient Data     Number of Meals  2     % PO Intake  75/100%               Electronically signed by:  Richelle Valladares RD  02/05/21 12:09 CST

## 2021-02-05 NOTE — PROGRESS NOTES
[facility] Medicine Services  INPATIENT PROGRESS NOTE    Length of Stay: 2  Date of Admission: 2/3/2021  Primary Care Physician: Clifford Weston MD    Subjective   Chief Complaint: Shortness of breath  HPI: Patient states he feels his breathing is much better today.  On nasal cannula.    Review of Systems   Constitutional: Positive for fatigue. Negative for appetite change, chills and fever.   Respiratory: Positive for cough and shortness of breath. Negative for chest tightness.    Cardiovascular: Negative for chest pain, palpitations and leg swelling.   Gastrointestinal: Negative for abdominal pain, constipation, diarrhea, nausea and vomiting.   Skin: Negative for wound.   Neurological: Negative for dizziness, weakness, light-headedness, numbness and headaches.        All pertinent negatives and positives are as above. All other systems have been reviewed and are negative unless otherwise stated.     Objective    Temp:  [97.7 °F (36.5 °C)-98.7 °F (37.1 °C)] 97.7 °F (36.5 °C)  Heart Rate:  [53-86] 69  Resp:  [17-25] 20  BP: (123-173)/() 132/62    Physical Exam  Vitals signs reviewed.   Constitutional:       Appearance: He is well-developed.   HENT:      Head: Normocephalic and atraumatic.   Eyes:      Pupils: Pupils are equal, round, and reactive to light.   Neck:      Musculoskeletal: Normal range of motion and neck supple.   Cardiovascular:      Rate and Rhythm: Normal rate and regular rhythm.      Heart sounds: Normal heart sounds. No murmur. No friction rub. No gallop.    Pulmonary:      Effort: Pulmonary effort is normal. No respiratory distress.      Breath sounds: Decreased breath sounds present. No wheezing or rales.   Chest:      Chest wall: No tenderness.   Abdominal:      General: Bowel sounds are normal. There is no distension.      Palpations: Abdomen is soft.      Tenderness: There is no abdominal tenderness.   Psychiatric:         Behavior: Behavior  normal.         Results Review:  I have reviewed the labs, radiology results, and diagnostic studies.    Laboratory Data:   Results from last 7 days   Lab Units 02/05/21  0307 02/04/21  0643 02/03/21  0944   SODIUM mmol/L 141 139 134*   POTASSIUM mmol/L 4.3 4.5 3.7   CHLORIDE mmol/L 104 101 96*   CO2 mmol/L 26.0 28.0 26.0   BUN mg/dL 25* 22 18   CREATININE mg/dL 0.84 0.79 0.81  0.77   GLUCOSE mg/dL 146* 290* 309*   CALCIUM mg/dL 9.3 9.5 9.9   BILIRUBIN mg/dL 0.3 0.4 0.7  0.7   ALK PHOS U/L 69 70 81  84   ALT (SGPT) U/L 37 33 36  37   AST (SGOT) U/L 47* 37 61*  63*   ANION GAP mmol/L 11.0 10.0 12.0     Estimated Creatinine Clearance: 93.3 mL/min (by C-G formula based on SCr of 0.84 mg/dL).  Results from last 7 days   Lab Units 02/03/21  0944   MAGNESIUM mg/dL 2.1         Results from last 7 days   Lab Units 02/05/21  0307 02/04/21  0643 02/03/21  0944   WBC 10*3/mm3 10.23 9.92 9.70   HEMOGLOBIN g/dL 15.2 15.3 16.3   HEMATOCRIT % 42.2 44.0 44.2   PLATELETS 10*3/mm3 434 425 378           Culture Data:   Blood Culture   Date Value Ref Range Status   02/03/2021 No growth at 2 days  Preliminary   02/03/2021 No growth at 2 days  Preliminary     No results found for: URINECX  No results found for: RESPCX  No results found for: WOUNDCX  No results found for: STOOLCX  No components found for: BODYFLD    Radiology Data:   Imaging Results (Last 24 Hours)     Procedure Component Value Units Date/Time    XR Chest 1 View [[id number]] Collected: 02/05/21 0454     Updated: 02/05/21 0616    Narrative:      EXAM: XR CHEST 1 VIEW    HISTORY: pna, U07.1 COVID-19 J12.82 Pneumonia due to Coronavirus  disease 2019 J96.01 Acute respiratory failure with hypoxia    COMPARISON: 2/3/2021    TECHNIQUE:   Portable view of the chest.    FINDINGS:   Prior median sternotomy. Bilateral diffuse airspace process, in  the left mid to lower lung and to a lesser extent in the  peripheral aspect of right mid to lower lung, overall increased  in extent  since prior.  There is no significant pleural effusion. The heart size is  within normal limits. The mediastinal contours are within normal  limits. .  No evidence of mediastinal shift or pneumothorax.  Unremarkable visualized osseous structures.      Impression:      1.  Bilateral diffuse airspace process, in the left mid to lower  lung and to a lesser extent in the peripheral aspect of right mid  to lower lung, overall increased in extent since prior.          Electronically signed by:  Hadley Bro MD  2/5/2021 6:15 AM CST  Workstation: [id number]          I have reviewed the patient's current medications.     Assessment/Plan     Active Hospital Problems    Diagnosis   • Pneumonia due to COVID-19 virus   • Acute respiratory failure (CMS/[facility])       Plan:    1.  Acute hypoxic respiratory failure: Secondary to COVID-19.  Currently patient states he is feeling much better.  On high flow nasal cannula.  2.  COVID-19 pneumonia: Symptoms have been ongoing for approximately 1 week.  Continue on dexamethasone and remdesivir.  Inflammatory markers continue to downtrend.    3.  Coronary artery disease: Status post coronary artery bypass grafting.  No overt ischemic symptoms.  Continue home medications.  4.  Diabetes mellitus: Hold oral medication.  Place on sliding scale insulin.  5.  Hypothyroidism: Continue Synthroid.  6.  DVT prophylaxis: Lovenox.        The patient was evaluated during the global COVID-19 pandemic, and the diagnosis was suspected/considered upon their initial presentation.  Evaluation, treatment, and testing were consistent with current guidelines for patients who present with complaints or symptoms that may be related to COVID-19.    Discharge Planning: I expect patient to be discharged to home in 4-5 days.        This document has been electronically signed by Prashant Luna MD on February 5, 2021 17:03 CST

## 2021-02-05 NOTE — PLAN OF CARE
Goal Outcome Evaluation:  Plan of Care Reviewed With: patient, spouse  Progress: improving  Outcome Summary: Patient weaned from 15L nonrebreather to 10L nonrebreather, Nursing has asked repiratory if we could try high flow NC, RT to assess patient; vital signs otherwise stable; shortness of breath with activity; will continue to monitor

## 2021-02-06 LAB
ALBUMIN SERPL-MCNC: 2.9 G/DL (ref 3.5–5.2)
ALBUMIN/GLOB SERPL: 0.8 G/DL
ALP SERPL-CCNC: 71 U/L (ref 39–117)
ALT SERPL W P-5'-P-CCNC: 39 U/L (ref 1–41)
ANION GAP SERPL CALCULATED.3IONS-SCNC: 11 MMOL/L (ref 5–15)
AST SERPL-CCNC: 36 U/L (ref 1–40)
BASOPHILS # BLD AUTO: 0.02 10*3/MM3 (ref 0–0.2)
BASOPHILS NFR BLD AUTO: 0.2 % (ref 0–1.5)
BILIRUB CONJ SERPL-MCNC: 0.2 MG/DL (ref 0–0.3)
BILIRUB SERPL-MCNC: 0.6 MG/DL (ref 0–1.2)
BUN SERPL-MCNC: 17 MG/DL (ref 8–23)
BUN/CREAT SERPL: 27.4 (ref 7–25)
CALCIUM SPEC-SCNC: 8.8 MG/DL (ref 8.6–10.5)
CHLORIDE SERPL-SCNC: 101 MMOL/L (ref 98–107)
CK SERPL-CCNC: 44 U/L (ref 20–200)
CO2 SERPL-SCNC: 23 MMOL/L (ref 22–29)
CREAT SERPL-MCNC: 0.62 MG/DL (ref 0.76–1.27)
CRP SERPL-MCNC: 6.13 MG/DL (ref 0–0.5)
D-DIMER, QUANTITATIVE (MAD,POW, STR): 2363 NG/ML (FEU) (ref 0–470)
DEPRECATED RDW RBC AUTO: 39.4 FL (ref 37–54)
EOSINOPHIL # BLD AUTO: 0.1 10*3/MM3 (ref 0–0.4)
EOSINOPHIL NFR BLD AUTO: 1 % (ref 0.3–6.2)
ERYTHROCYTE [DISTWIDTH] IN BLOOD BY AUTOMATED COUNT: 12.2 % (ref 12.3–15.4)
FERRITIN SERPL-MCNC: 1120 NG/ML (ref 30–400)
FIBRINOGEN PPP-MCNC: 592 MG/DL (ref 228–514)
GFR SERPL CREATININE-BSD FRML MDRD: 127 ML/MIN/1.73
GLOBULIN UR ELPH-MCNC: 3.6 GM/DL
GLUCOSE BLDC GLUCOMTR-MCNC: 238 MG/DL (ref 70–130)
GLUCOSE BLDC GLUCOMTR-MCNC: 259 MG/DL (ref 70–130)
GLUCOSE BLDC GLUCOMTR-MCNC: 260 MG/DL (ref 70–130)
GLUCOSE BLDC GLUCOMTR-MCNC: 272 MG/DL (ref 70–130)
GLUCOSE BLDC GLUCOMTR-MCNC: 310 MG/DL (ref 70–130)
GLUCOSE SERPL-MCNC: 253 MG/DL (ref 65–99)
HCT VFR BLD AUTO: 41.3 % (ref 37.5–51)
HGB BLD-MCNC: 14.9 G/DL (ref 13–17.7)
IMM GRANULOCYTES # BLD AUTO: 0.21 10*3/MM3 (ref 0–0.05)
IMM GRANULOCYTES NFR BLD AUTO: 2 % (ref 0–0.5)
LDH SERPL-CCNC: 336 U/L (ref 135–225)
LYMPHOCYTES # BLD AUTO: 0.64 10*3/MM3 (ref 0.7–3.1)
LYMPHOCYTES NFR BLD AUTO: 6.2 % (ref 19.6–45.3)
MCH RBC QN AUTO: 31.8 PG (ref 26.6–33)
MCHC RBC AUTO-ENTMCNC: 36.1 G/DL (ref 31.5–35.7)
MCV RBC AUTO: 88.2 FL (ref 79–97)
MONOCYTES # BLD AUTO: 0.85 10*3/MM3 (ref 0.1–0.9)
MONOCYTES NFR BLD AUTO: 8.2 % (ref 5–12)
NEUTROPHILS NFR BLD AUTO: 8.56 10*3/MM3 (ref 1.7–7)
NEUTROPHILS NFR BLD AUTO: 82.4 % (ref 42.7–76)
NRBC BLD AUTO-RTO: 0 /100 WBC (ref 0–0.2)
PLATELET # BLD AUTO: 420 10*3/MM3 (ref 140–450)
PMV BLD AUTO: 9.1 FL (ref 6–12)
POTASSIUM SERPL-SCNC: 3.9 MMOL/L (ref 3.5–5.2)
PROT SERPL-MCNC: 6.5 G/DL (ref 6–8.5)
RBC # BLD AUTO: 4.68 10*6/MM3 (ref 4.14–5.8)
SODIUM SERPL-SCNC: 135 MMOL/L (ref 136–145)
WBC # BLD AUTO: 10.38 10*3/MM3 (ref 3.4–10.8)

## 2021-02-06 PROCEDURE — 94760 N-INVAS EAR/PLS OXIMETRY 1: CPT

## 2021-02-06 PROCEDURE — 63710000001 INSULIN ASPART PER 5 UNITS: Performed by: FAMILY MEDICINE

## 2021-02-06 PROCEDURE — 82728 ASSAY OF FERRITIN: CPT | Performed by: HOSPITALIST

## 2021-02-06 PROCEDURE — 80053 COMPREHEN METABOLIC PANEL: CPT | Performed by: HOSPITALIST

## 2021-02-06 PROCEDURE — 25010000002 ENOXAPARIN PER 10 MG: Performed by: HOSPITALIST

## 2021-02-06 PROCEDURE — 94799 UNLISTED PULMONARY SVC/PX: CPT

## 2021-02-06 PROCEDURE — 94640 AIRWAY INHALATION TREATMENT: CPT

## 2021-02-06 PROCEDURE — 85379 FIBRIN DEGRADATION QUANT: CPT | Performed by: HOSPITALIST

## 2021-02-06 PROCEDURE — 25010000002 DEXAMETHASONE PER 1 MG: Performed by: HOSPITALIST

## 2021-02-06 PROCEDURE — 82248 BILIRUBIN DIRECT: CPT | Performed by: HOSPITALIST

## 2021-02-06 PROCEDURE — 85384 FIBRINOGEN ACTIVITY: CPT | Performed by: HOSPITALIST

## 2021-02-06 PROCEDURE — 85025 COMPLETE CBC W/AUTO DIFF WBC: CPT | Performed by: HOSPITALIST

## 2021-02-06 PROCEDURE — 25010000002 DEXAMETHASONE PER 1 MG: Performed by: FAMILY MEDICINE

## 2021-02-06 PROCEDURE — 86140 C-REACTIVE PROTEIN: CPT | Performed by: HOSPITALIST

## 2021-02-06 PROCEDURE — 83615 LACTATE (LD) (LDH) ENZYME: CPT | Performed by: HOSPITALIST

## 2021-02-06 PROCEDURE — 82550 ASSAY OF CK (CPK): CPT | Performed by: HOSPITALIST

## 2021-02-06 PROCEDURE — 82962 GLUCOSE BLOOD TEST: CPT

## 2021-02-06 RX ORDER — DEXAMETHASONE SODIUM PHOSPHATE 4 MG/ML
6 INJECTION, SOLUTION INTRA-ARTICULAR; INTRALESIONAL; INTRAMUSCULAR; INTRAVENOUS; SOFT TISSUE 2 TIMES DAILY
Status: DISCONTINUED | OUTPATIENT
Start: 2021-02-06 | End: 2021-02-12 | Stop reason: HOSPADM

## 2021-02-06 RX ORDER — BUDESONIDE AND FORMOTEROL FUMARATE DIHYDRATE 160; 4.5 UG/1; UG/1
2 AEROSOL RESPIRATORY (INHALATION)
Status: DISCONTINUED | OUTPATIENT
Start: 2021-02-06 | End: 2021-02-12 | Stop reason: HOSPADM

## 2021-02-06 RX ORDER — ALBUTEROL SULFATE 90 UG/1
2 AEROSOL, METERED RESPIRATORY (INHALATION)
Status: DISCONTINUED | OUTPATIENT
Start: 2021-02-06 | End: 2021-02-12 | Stop reason: HOSPADM

## 2021-02-06 RX ADMIN — SODIUM CHLORIDE, PRESERVATIVE FREE 10 ML: 5 INJECTION INTRAVENOUS at 20:46

## 2021-02-06 RX ADMIN — INSULIN ASPART 8 UNITS: 100 INJECTION, SOLUTION INTRAVENOUS; SUBCUTANEOUS at 11:20

## 2021-02-06 RX ADMIN — BUDESONIDE AND FORMOTEROL FUMARATE DIHYDRATE 2 PUFF: 160; 4.5 AEROSOL RESPIRATORY (INHALATION) at 12:09

## 2021-02-06 RX ADMIN — BARICITINIB 4 MG: 2 TABLET, FILM COATED ORAL at 10:17

## 2021-02-06 RX ADMIN — ENOXAPARIN SODIUM 40 MG: 40 INJECTION SUBCUTANEOUS at 20:46

## 2021-02-06 RX ADMIN — DEXAMETHASONE SODIUM PHOSPHATE 6 MG: 4 INJECTION, SOLUTION INTRAMUSCULAR; INTRAVENOUS at 09:05

## 2021-02-06 RX ADMIN — INSULIN ASPART 10 UNITS: 100 INJECTION, SOLUTION INTRAVENOUS; SUBCUTANEOUS at 17:44

## 2021-02-06 RX ADMIN — IPRATROPIUM BROMIDE 2 PUFF: 17 AEROSOL, METERED RESPIRATORY (INHALATION) at 12:07

## 2021-02-06 RX ADMIN — IPRATROPIUM BROMIDE 2 PUFF: 17 AEROSOL, METERED RESPIRATORY (INHALATION) at 19:57

## 2021-02-06 RX ADMIN — ALBUTEROL SULFATE 2 PUFF: 90 AEROSOL, METERED RESPIRATORY (INHALATION) at 12:00

## 2021-02-06 RX ADMIN — DEXAMETHASONE SODIUM PHOSPHATE 6 MG: 4 INJECTION, SOLUTION INTRA-ARTICULAR; INTRALESIONAL; INTRAMUSCULAR; INTRAVENOUS; SOFT TISSUE at 20:46

## 2021-02-06 RX ADMIN — ASPIRIN 81 MG: 81 TABLET, COATED ORAL at 09:07

## 2021-02-06 RX ADMIN — TAMSULOSIN HYDROCHLORIDE 0.4 MG: 0.4 CAPSULE ORAL at 09:07

## 2021-02-06 RX ADMIN — ATORVASTATIN CALCIUM 40 MG: 40 TABLET, FILM COATED ORAL at 09:07

## 2021-02-06 RX ADMIN — LEVOTHYROXINE SODIUM 125 MCG: 125 TABLET ORAL at 05:14

## 2021-02-06 RX ADMIN — OXYCODONE HYDROCHLORIDE AND ACETAMINOPHEN 1000 MG: 500 TABLET ORAL at 09:07

## 2021-02-06 RX ADMIN — SODIUM CHLORIDE, PRESERVATIVE FREE 10 ML: 5 INJECTION INTRAVENOUS at 09:06

## 2021-02-06 RX ADMIN — IPRATROPIUM BROMIDE 2 PUFF: 17 AEROSOL, METERED RESPIRATORY (INHALATION) at 16:08

## 2021-02-06 RX ADMIN — ALBUTEROL SULFATE 2 PUFF: 90 AEROSOL, METERED RESPIRATORY (INHALATION) at 19:56

## 2021-02-06 RX ADMIN — THERA TABS 1 TABLET: TAB at 09:07

## 2021-02-06 RX ADMIN — CETIRIZINE HYDROCHLORIDE 10 MG: 10 TABLET, FILM COATED ORAL at 09:07

## 2021-02-06 RX ADMIN — PANTOPRAZOLE SODIUM 40 MG: 40 TABLET, DELAYED RELEASE ORAL at 05:14

## 2021-02-06 RX ADMIN — ALBUTEROL SULFATE 2 PUFF: 90 AEROSOL, METERED RESPIRATORY (INHALATION) at 16:07

## 2021-02-06 RX ADMIN — BUDESONIDE AND FORMOTEROL FUMARATE DIHYDRATE 2 PUFF: 160; 4.5 AEROSOL RESPIRATORY (INHALATION) at 19:56

## 2021-02-06 RX ADMIN — REMDESIVIR 100 MG: 100 INJECTION, POWDER, LYOPHILIZED, FOR SOLUTION INTRAVENOUS at 21:31

## 2021-02-06 NOTE — PROGRESS NOTES
[facility] Medicine Services  INPATIENT PROGRESS NOTE    Length of Stay: 3  Date of Admission: 2/3/2021  Primary Care Physician: Clifford Weston MD    Subjective   Chief Complaint: Shortness of breath  HPI: Currently wearing a nonrebreather but states he otherwise feels well.  Denies any new concerns.    Review of Systems   Constitutional: Negative for chills and fever.   HENT: Negative for congestion.    Respiratory: Positive for shortness of breath.    Cardiovascular: Negative for chest pain.   Gastrointestinal: Negative for abdominal pain, diarrhea, nausea and vomiting.   Genitourinary: Negative for dysuria.   Musculoskeletal: Negative for arthralgias.   Skin: Negative for rash.   Neurological: Negative for dizziness and weakness.   Psychiatric/Behavioral: Negative for sleep disturbance. The patient is not nervous/anxious.    All other systems reviewed and are negative.     All pertinent negatives and positives are as above. All other systems have been reviewed and are negative unless otherwise stated.     Objective    Temp:  [96.5 °F (35.8 °C)-97.7 °F (36.5 °C)] 96.9 °F (36.1 °C)  Heart Rate:  [61-83] 76  Resp:  [17-24] 24  BP: (131-165)/() 155/72    Physical Exam  Constitutional:       General: He is not in acute distress.     Appearance: He is not toxic-appearing.   HENT:      Head: Normocephalic and atraumatic.      Right Ear: External ear normal.      Left Ear: External ear normal.      Nose: Nose normal.      Mouth/Throat:      Mouth: Mucous membranes are moist.      Pharynx: Oropharynx is clear.   Eyes:      Conjunctiva/sclera: Conjunctivae normal.   Neck:      Musculoskeletal: Neck supple.   Cardiovascular:      Rate and Rhythm: Normal rate and regular rhythm.      Pulses: Normal pulses.      Heart sounds: Normal heart sounds.   Pulmonary:      Comments: Diminished breath sounds bilaterally with decreased aeration  Abdominal:      General: Bowel sounds are  normal.      Palpations: Abdomen is soft.      Tenderness: There is no abdominal tenderness.   Musculoskeletal:         General: No swelling.   Skin:     General: Skin is warm.      Capillary Refill: Capillary refill takes less than 2 seconds.   Neurological:      General: No focal deficit present.      Mental Status: He is alert and oriented to person, place, and time. Mental status is at baseline.      Coordination: Coordination normal.   Psychiatric:         Mood and Affect: Mood normal.         Behavior: Behavior normal.         Results Review:  I have reviewed the labs, radiology results, and diagnostic studies.    Laboratory Data:   Results from last 7 days   Lab Units 02/06/21  0709 02/05/21  0307 02/04/21  0643   SODIUM mmol/L 135* 141 139   POTASSIUM mmol/L 3.9 4.3 4.5   CHLORIDE mmol/L 101 104 101   CO2 mmol/L 23.0 26.0 28.0   BUN mg/dL 17 25* 22   CREATININE mg/dL 0.62* 0.84 0.79   GLUCOSE mg/dL 253* 146* 290*   CALCIUM mg/dL 8.8 9.3 9.5   BILIRUBIN mg/dL 0.6 0.3 0.4   ALK PHOS U/L 71 69 70   ALT (SGPT) U/L 39 37 33   AST (SGOT) U/L 36 47* 37   ANION GAP mmol/L 11.0 11.0 10.0     Estimated Creatinine Clearance: 102.1 mL/min (A) (by C-G formula based on SCr of 0.62 mg/dL (L)).  Results from last 7 days   Lab Units 02/03/21  0944   MAGNESIUM mg/dL 2.1         Results from last 7 days   Lab Units 02/06/21  0709 02/05/21  0307 02/04/21  0643 02/03/21  0944   WBC 10*3/mm3 10.38 10.23 9.92 9.70   HEMOGLOBIN g/dL 14.9 15.2 15.3 16.3   HEMATOCRIT % 41.3 42.2 44.0 44.2   PLATELETS 10*3/mm3 420 434 425 378           Culture Data:   Blood Culture   Date Value Ref Range Status   02/03/2021 No growth at 2 days  Preliminary   02/03/2021 No growth at 2 days  Preliminary     No results found for: URINECX  No results found for: RESPCX  No results found for: WOUNDCX  No results found for: STOOLCX  No components found for: BODYFLD    Radiology Data:   Imaging Results (Last 24 Hours)     ** No results found for the last 24  hours. **          I have reviewed the patient's current medications.     Assessment/Plan     Active Problems:    Pneumonia due to COVID-19 virus    Acute respiratory failure (CMS/HCC)    Plan:  -Continue supplemental oxygen wean as tolerated  -Continue Decadron remdesivir, will increase Decadron frequency to twice daily  -Continue baricitinib as well  -Bronchodilators been ordered  -Expect blood sugars to be elevated given steroid use, will increase sliding scale and continue glucose checks.  -Continue home medications as ordered  -We will order PT and OT when his O2 requirements decreased  -DVT prophylaxis with Lovenox  -CODE STATUS: Full    The patient was evaluated during the global COVID-19 pandemic, and the diagnosis was suspected/considered upon their initial presentation.  Evaluation, treatment, and testing were consistent with current guidelines for patients who present with complaints or symptoms that may be related to COVID-19.      Discharge Planning: In process.    Alfie Willis MD

## 2021-02-06 NOTE — PLAN OF CARE
Goal Outcome Evaluation:  Plan of Care Reviewed With: patient  Progress: improving  Outcome Summary: Patient transitioned to high flow nasal cannul this AM tolerating well; up to chair for 2 hours this AM tolerated fairly, became fatigued; transferred to 4W this evening, vital signs stable.

## 2021-02-06 NOTE — PLAN OF CARE
Goal Outcome Evaluation:     Patient is on 15 L/min high flow he fluctuates from high 80s with movement but stays mainly in the low 90s

## 2021-02-06 NOTE — PLAN OF CARE
Goal Outcome Evaluation:  Plan of Care Reviewed With: patient     Outcome Summary: vss, now on airvo and maintaining. no distress otherwise.

## 2021-02-07 LAB
ALBUMIN SERPL-MCNC: 3 G/DL (ref 3.5–5.2)
ALBUMIN/GLOB SERPL: 0.8 G/DL
ALP SERPL-CCNC: 70 U/L (ref 39–117)
ALT SERPL W P-5'-P-CCNC: 37 U/L (ref 1–41)
ANION GAP SERPL CALCULATED.3IONS-SCNC: 10 MMOL/L (ref 5–15)
AST SERPL-CCNC: 26 U/L (ref 1–40)
BASOPHILS # BLD AUTO: 0.06 10*3/MM3 (ref 0–0.2)
BASOPHILS NFR BLD AUTO: 0.6 % (ref 0–1.5)
BILIRUB CONJ SERPL-MCNC: 0.2 MG/DL (ref 0–0.3)
BILIRUB SERPL-MCNC: 0.6 MG/DL (ref 0–1.2)
BUN SERPL-MCNC: 17 MG/DL (ref 8–23)
BUN/CREAT SERPL: 23.3 (ref 7–25)
CALCIUM SPEC-SCNC: 8.9 MG/DL (ref 8.6–10.5)
CHLORIDE SERPL-SCNC: 100 MMOL/L (ref 98–107)
CK SERPL-CCNC: 30 U/L (ref 20–200)
CO2 SERPL-SCNC: 26 MMOL/L (ref 22–29)
CREAT SERPL-MCNC: 0.73 MG/DL (ref 0.76–1.27)
CRP SERPL-MCNC: 9.08 MG/DL (ref 0–0.5)
DEPRECATED RDW RBC AUTO: 40.2 FL (ref 37–54)
EOSINOPHIL # BLD AUTO: 0 10*3/MM3 (ref 0–0.4)
EOSINOPHIL NFR BLD AUTO: 0 % (ref 0.3–6.2)
ERYTHROCYTE [DISTWIDTH] IN BLOOD BY AUTOMATED COUNT: 12.3 % (ref 12.3–15.4)
FERRITIN SERPL-MCNC: 1252 NG/ML (ref 30–400)
GFR SERPL CREATININE-BSD FRML MDRD: 105 ML/MIN/1.73
GLOBULIN UR ELPH-MCNC: 3.7 GM/DL
GLUCOSE BLDC GLUCOMTR-MCNC: 315 MG/DL (ref 70–130)
GLUCOSE BLDC GLUCOMTR-MCNC: 329 MG/DL (ref 70–130)
GLUCOSE BLDC GLUCOMTR-MCNC: 343 MG/DL (ref 70–130)
GLUCOSE SERPL-MCNC: 271 MG/DL (ref 65–99)
HCT VFR BLD AUTO: 41.9 % (ref 37.5–51)
HGB BLD-MCNC: 14.8 G/DL (ref 13–17.7)
IMM GRANULOCYTES # BLD AUTO: 0.39 10*3/MM3 (ref 0–0.05)
IMM GRANULOCYTES NFR BLD AUTO: 4 % (ref 0–0.5)
LYMPHOCYTES # BLD AUTO: 0.71 10*3/MM3 (ref 0.7–3.1)
LYMPHOCYTES NFR BLD AUTO: 7.3 % (ref 19.6–45.3)
MCH RBC QN AUTO: 32 PG (ref 26.6–33)
MCHC RBC AUTO-ENTMCNC: 35.3 G/DL (ref 31.5–35.7)
MCV RBC AUTO: 90.7 FL (ref 79–97)
MONOCYTES # BLD AUTO: 0.85 10*3/MM3 (ref 0.1–0.9)
MONOCYTES NFR BLD AUTO: 8.8 % (ref 5–12)
NEUTROPHILS NFR BLD AUTO: 7.67 10*3/MM3 (ref 1.7–7)
NEUTROPHILS NFR BLD AUTO: 79.3 % (ref 42.7–76)
NRBC BLD AUTO-RTO: 0 /100 WBC (ref 0–0.2)
PLATELET # BLD AUTO: 497 10*3/MM3 (ref 140–450)
PMV BLD AUTO: 9.3 FL (ref 6–12)
POTASSIUM SERPL-SCNC: 5 MMOL/L (ref 3.5–5.2)
PROT SERPL-MCNC: 6.7 G/DL (ref 6–8.5)
RBC # BLD AUTO: 4.62 10*6/MM3 (ref 4.14–5.8)
SODIUM SERPL-SCNC: 136 MMOL/L (ref 136–145)
WBC # BLD AUTO: 9.68 10*3/MM3 (ref 3.4–10.8)

## 2021-02-07 PROCEDURE — 94760 N-INVAS EAR/PLS OXIMETRY 1: CPT

## 2021-02-07 PROCEDURE — 63710000001 INSULIN DETEMIR PER 5 UNITS: Performed by: FAMILY MEDICINE

## 2021-02-07 PROCEDURE — 82248 BILIRUBIN DIRECT: CPT | Performed by: HOSPITALIST

## 2021-02-07 PROCEDURE — 80053 COMPREHEN METABOLIC PANEL: CPT | Performed by: HOSPITALIST

## 2021-02-07 PROCEDURE — 85025 COMPLETE CBC W/AUTO DIFF WBC: CPT | Performed by: HOSPITALIST

## 2021-02-07 PROCEDURE — 63710000001 INSULIN ASPART PER 5 UNITS: Performed by: FAMILY MEDICINE

## 2021-02-07 PROCEDURE — 82728 ASSAY OF FERRITIN: CPT | Performed by: HOSPITALIST

## 2021-02-07 PROCEDURE — 82962 GLUCOSE BLOOD TEST: CPT

## 2021-02-07 PROCEDURE — 25010000002 DEXAMETHASONE PER 1 MG: Performed by: FAMILY MEDICINE

## 2021-02-07 PROCEDURE — 94799 UNLISTED PULMONARY SVC/PX: CPT

## 2021-02-07 PROCEDURE — 25010000002 ENOXAPARIN PER 10 MG: Performed by: HOSPITALIST

## 2021-02-07 PROCEDURE — 86140 C-REACTIVE PROTEIN: CPT | Performed by: HOSPITALIST

## 2021-02-07 PROCEDURE — 82550 ASSAY OF CK (CPK): CPT | Performed by: HOSPITALIST

## 2021-02-07 RX ADMIN — INSULIN ASPART 8 UNITS: 100 INJECTION, SOLUTION INTRAVENOUS; SUBCUTANEOUS at 09:35

## 2021-02-07 RX ADMIN — IPRATROPIUM BROMIDE 2 PUFF: 17 AEROSOL, METERED RESPIRATORY (INHALATION) at 07:22

## 2021-02-07 RX ADMIN — IPRATROPIUM BROMIDE 2 PUFF: 17 AEROSOL, METERED RESPIRATORY (INHALATION) at 19:56

## 2021-02-07 RX ADMIN — ALBUTEROL SULFATE 2 PUFF: 90 AEROSOL, METERED RESPIRATORY (INHALATION) at 07:22

## 2021-02-07 RX ADMIN — INSULIN ASPART 10 UNITS: 100 INJECTION, SOLUTION INTRAVENOUS; SUBCUTANEOUS at 18:27

## 2021-02-07 RX ADMIN — DEXAMETHASONE SODIUM PHOSPHATE 6 MG: 4 INJECTION, SOLUTION INTRA-ARTICULAR; INTRALESIONAL; INTRAMUSCULAR; INTRAVENOUS; SOFT TISSUE at 09:35

## 2021-02-07 RX ADMIN — LEVOTHYROXINE SODIUM 125 MCG: 125 TABLET ORAL at 05:21

## 2021-02-07 RX ADMIN — INSULIN ASPART 10 UNITS: 100 INJECTION, SOLUTION INTRAVENOUS; SUBCUTANEOUS at 11:57

## 2021-02-07 RX ADMIN — IPRATROPIUM BROMIDE 2 PUFF: 17 AEROSOL, METERED RESPIRATORY (INHALATION) at 15:41

## 2021-02-07 RX ADMIN — INSULIN DETEMIR 10 UNITS: 100 INJECTION, SOLUTION SUBCUTANEOUS at 21:52

## 2021-02-07 RX ADMIN — DEXAMETHASONE SODIUM PHOSPHATE 6 MG: 4 INJECTION, SOLUTION INTRA-ARTICULAR; INTRALESIONAL; INTRAMUSCULAR; INTRAVENOUS; SOFT TISSUE at 20:06

## 2021-02-07 RX ADMIN — BUDESONIDE AND FORMOTEROL FUMARATE DIHYDRATE 2 PUFF: 160; 4.5 AEROSOL RESPIRATORY (INHALATION) at 07:22

## 2021-02-07 RX ADMIN — ALBUTEROL SULFATE 2 PUFF: 90 AEROSOL, METERED RESPIRATORY (INHALATION) at 15:41

## 2021-02-07 RX ADMIN — ALBUTEROL SULFATE 2 PUFF: 90 AEROSOL, METERED RESPIRATORY (INHALATION) at 19:56

## 2021-02-07 RX ADMIN — REMDESIVIR 100 MG: 100 INJECTION, POWDER, LYOPHILIZED, FOR SOLUTION INTRAVENOUS at 21:52

## 2021-02-07 RX ADMIN — IPRATROPIUM BROMIDE 2 PUFF: 17 AEROSOL, METERED RESPIRATORY (INHALATION) at 11:04

## 2021-02-07 RX ADMIN — SODIUM CHLORIDE, PRESERVATIVE FREE 10 ML: 5 INJECTION INTRAVENOUS at 09:36

## 2021-02-07 RX ADMIN — ATORVASTATIN CALCIUM 40 MG: 40 TABLET, FILM COATED ORAL at 09:35

## 2021-02-07 RX ADMIN — THERA TABS 1 TABLET: TAB at 09:35

## 2021-02-07 RX ADMIN — BUDESONIDE AND FORMOTEROL FUMARATE DIHYDRATE 2 PUFF: 160; 4.5 AEROSOL RESPIRATORY (INHALATION) at 19:56

## 2021-02-07 RX ADMIN — ALBUTEROL SULFATE 2 PUFF: 90 AEROSOL, METERED RESPIRATORY (INHALATION) at 11:04

## 2021-02-07 RX ADMIN — OXYCODONE HYDROCHLORIDE AND ACETAMINOPHEN 1000 MG: 500 TABLET ORAL at 09:35

## 2021-02-07 RX ADMIN — TAMSULOSIN HYDROCHLORIDE 0.4 MG: 0.4 CAPSULE ORAL at 09:35

## 2021-02-07 RX ADMIN — ASPIRIN 81 MG: 81 TABLET, COATED ORAL at 09:35

## 2021-02-07 RX ADMIN — PANTOPRAZOLE SODIUM 40 MG: 40 TABLET, DELAYED RELEASE ORAL at 05:21

## 2021-02-07 RX ADMIN — BARICITINIB 4 MG: 2 TABLET, FILM COATED ORAL at 09:35

## 2021-02-07 RX ADMIN — ENOXAPARIN SODIUM 40 MG: 40 INJECTION SUBCUTANEOUS at 20:06

## 2021-02-07 RX ADMIN — CETIRIZINE HYDROCHLORIDE 10 MG: 10 TABLET, FILM COATED ORAL at 09:35

## 2021-02-07 NOTE — PLAN OF CARE
Goal Outcome Evaluation:    Patient is still struggling to maintain sats.  He drops with any movement currently he rebounds quickly.

## 2021-02-07 NOTE — PLAN OF CARE
Goal Outcome Evaluation:  Plan of Care Reviewed With: patient  Progress: improving  Outcome Summary: vss, maintaining on Airvo, resting between care, no pain reported, spouse updated

## 2021-02-07 NOTE — PROGRESS NOTES
[facility] Medicine Services  INPATIENT PROGRESS NOTE    Length of Stay: 4  Date of Admission: 2/3/2021  Primary Care Physician: Clifford Weston MD    Subjective   Chief Complaint: Shortness of breath  HPI: Patient reports feeling somewhat improved during exam.  He is still active shortness of breath but states not as bad as it was last night.  He is currently maintaining his oxygen saturations on Airvo.    Review of Systems   Constitutional: Negative for chills and fever.   HENT: Negative for congestion.    Respiratory: Positive for shortness of breath.    Cardiovascular: Negative for chest pain.   Gastrointestinal: Negative for abdominal pain, diarrhea, nausea and vomiting.   Genitourinary: Negative for dysuria.   Musculoskeletal: Negative for arthralgias.   Skin: Negative for rash.   Neurological: Negative for dizziness and weakness.   Psychiatric/Behavioral: Negative for sleep disturbance. The patient is not nervous/anxious.    All other systems reviewed and are negative.     All pertinent negatives and positives are as above. All other systems have been reviewed and are negative unless otherwise stated.     Objective    Temp:  [96.9 °F (36.1 °C)-97.6 °F (36.4 °C)] 97.6 °F (36.4 °C)  Heart Rate:  [61-79] 76  Resp:  [18-20] 19  BP: (125-145)/(61-80) 145/80    Physical Exam  Constitutional:       General: He is not in acute distress.     Appearance: He is not toxic-appearing.   HENT:      Head: Normocephalic and atraumatic.      Right Ear: External ear normal.      Left Ear: External ear normal.      Nose: Nose normal.      Mouth/Throat:      Mouth: Mucous membranes are moist.      Pharynx: Oropharynx is clear.   Eyes:      Conjunctiva/sclera: Conjunctivae normal.   Neck:      Musculoskeletal: Neck supple.   Cardiovascular:      Rate and Rhythm: Normal rate and regular rhythm.      Pulses: Normal pulses.      Heart sounds: Normal heart sounds.   Pulmonary:      Comments:  Diminished breath sounds bilaterally with decreased aeration  Abdominal:      General: Bowel sounds are normal.      Palpations: Abdomen is soft.      Tenderness: There is no abdominal tenderness.   Musculoskeletal:         General: No swelling.   Skin:     General: Skin is warm.      Capillary Refill: Capillary refill takes less than 2 seconds.   Neurological:      General: No focal deficit present.      Mental Status: He is alert and oriented to person, place, and time. Mental status is at baseline.      Coordination: Coordination normal.   Psychiatric:         Mood and Affect: Mood normal.         Behavior: Behavior normal.         Results Review:  I have reviewed the labs, radiology results, and diagnostic studies.    Laboratory Data:   Results from last 7 days   Lab Units 02/07/21  0656 02/06/21  0709 02/05/21  0307   SODIUM mmol/L 136 135* 141   POTASSIUM mmol/L 5.0 3.9 4.3   CHLORIDE mmol/L 100 101 104   CO2 mmol/L 26.0 23.0 26.0   BUN mg/dL 17 17 25*   CREATININE mg/dL 0.73* 0.62* 0.84   GLUCOSE mg/dL 271* 253* 146*   CALCIUM mg/dL 8.9 8.8 9.3   BILIRUBIN mg/dL 0.6 0.6 0.3   ALK PHOS U/L 70 71 69   ALT (SGPT) U/L 37 39 37   AST (SGOT) U/L 26 36 47*   ANION GAP mmol/L 10.0 11.0 11.0     Estimated Creatinine Clearance: 103.1 mL/min (A) (by C-G formula based on SCr of 0.73 mg/dL (L)).  Results from last 7 days   Lab Units 02/03/21  0944   MAGNESIUM mg/dL 2.1         Results from last 7 days   Lab Units 02/07/21  0656 02/06/21  0709 02/05/21  0307 02/04/21  0643 02/03/21  0944   WBC 10*3/mm3 9.68 10.38 10.23 9.92 9.70   HEMOGLOBIN g/dL 14.8 14.9 15.2 15.3 16.3   HEMATOCRIT % 41.9 41.3 42.2 44.0 44.2   PLATELETS 10*3/mm3 497* 420 434 425 378           Culture Data:   No results found for: BLOODCX  No results found for: URINECX  No results found for: RESPCX  No results found for: WOUNDCX  No results found for: STOOLCX  No components found for: BODYFLD    Radiology Data:   Imaging Results (Last 24 Hours)     ** No  results found for the last 24 hours. **          I have reviewed the patient's current medications.     Assessment/Plan     Active Problems:    Pneumonia due to COVID-19 virus    Acute respiratory failure (CMS/[facility])    Plan:  -Continue supplemental oxygen wean as tolerated, for now continue with Airvo  -Continue Decadron twice daily and remdesivir  -Continue baricitinib as well  -Bronchodilators been ordered  -Expect blood sugars to be elevated given steroid use, continue current sliding scale orders and glucose checks.  We will add in a low-dose of Levemir this evening to see if it helps with his glycemic control.  -Continue home medications as ordered  -We will order PT and OT when his O2 requirements decreased  -DVT prophylaxis with Lovenox  -CODE STATUS: Full    The patient was evaluated during the global COVID-19 pandemic, and the diagnosis was suspected/considered upon their initial presentation.  Evaluation, treatment, and testing were consistent with current guidelines for patients who present with complaints or symptoms that may be related to COVID-19.      Discharge Planning: In process.  I did discuss with him today that next dose is worse maintaining oxygen saturation would be consideration for BiPAP versus intubation.    Alfie Willis MD

## 2021-02-08 LAB
ALBUMIN SERPL-MCNC: 3 G/DL (ref 3.5–5.2)
ALBUMIN/GLOB SERPL: 0.9 G/DL
ALP SERPL-CCNC: 63 U/L (ref 39–117)
ALT SERPL W P-5'-P-CCNC: 32 U/L (ref 1–41)
ANION GAP SERPL CALCULATED.3IONS-SCNC: 10 MMOL/L (ref 5–15)
AST SERPL-CCNC: 23 U/L (ref 1–40)
BACTERIA SPEC AEROBE CULT: NORMAL
BACTERIA SPEC AEROBE CULT: NORMAL
BASOPHILS # BLD AUTO: 0.03 10*3/MM3 (ref 0–0.2)
BASOPHILS NFR BLD AUTO: 0.3 % (ref 0–1.5)
BILIRUB CONJ SERPL-MCNC: 0.2 MG/DL (ref 0–0.3)
BILIRUB SERPL-MCNC: 0.5 MG/DL (ref 0–1.2)
BUN SERPL-MCNC: 20 MG/DL (ref 8–23)
BUN/CREAT SERPL: 26.3 (ref 7–25)
CALCIUM SPEC-SCNC: 8.6 MG/DL (ref 8.6–10.5)
CHLORIDE SERPL-SCNC: 100 MMOL/L (ref 98–107)
CK SERPL-CCNC: 46 U/L (ref 20–200)
CO2 SERPL-SCNC: 25 MMOL/L (ref 22–29)
CREAT SERPL-MCNC: 0.76 MG/DL (ref 0.76–1.27)
CRP SERPL-MCNC: 3.77 MG/DL (ref 0–0.5)
D-DIMER, QUANTITATIVE (MAD,POW, STR): 1395 NG/ML (FEU) (ref 0–470)
DEPRECATED RDW RBC AUTO: 39.4 FL (ref 37–54)
EOSINOPHIL # BLD AUTO: 0 10*3/MM3 (ref 0–0.4)
EOSINOPHIL NFR BLD AUTO: 0 % (ref 0.3–6.2)
ERYTHROCYTE [DISTWIDTH] IN BLOOD BY AUTOMATED COUNT: 12.2 % (ref 12.3–15.4)
FERRITIN SERPL-MCNC: 1216 NG/ML (ref 30–400)
FIBRINOGEN PPP-MCNC: 458 MG/DL (ref 228–514)
GFR SERPL CREATININE-BSD FRML MDRD: 100 ML/MIN/1.73
GLOBULIN UR ELPH-MCNC: 3.5 GM/DL
GLUCOSE BLDC GLUCOMTR-MCNC: 237 MG/DL (ref 70–130)
GLUCOSE BLDC GLUCOMTR-MCNC: 237 MG/DL (ref 70–130)
GLUCOSE BLDC GLUCOMTR-MCNC: 270 MG/DL (ref 70–130)
GLUCOSE BLDC GLUCOMTR-MCNC: 293 MG/DL (ref 70–130)
GLUCOSE BLDC GLUCOMTR-MCNC: 308 MG/DL (ref 70–130)
GLUCOSE SERPL-MCNC: 270 MG/DL (ref 65–99)
HCT VFR BLD AUTO: 41.2 % (ref 37.5–51)
HGB BLD-MCNC: 14.7 G/DL (ref 13–17.7)
IMM GRANULOCYTES # BLD AUTO: 0.38 10*3/MM3 (ref 0–0.05)
IMM GRANULOCYTES NFR BLD AUTO: 3.7 % (ref 0–0.5)
LDH SERPL-CCNC: 289 U/L (ref 135–225)
LYMPHOCYTES # BLD AUTO: 0.74 10*3/MM3 (ref 0.7–3.1)
LYMPHOCYTES NFR BLD AUTO: 7.3 % (ref 19.6–45.3)
MCH RBC QN AUTO: 31.8 PG (ref 26.6–33)
MCHC RBC AUTO-ENTMCNC: 35.7 G/DL (ref 31.5–35.7)
MCV RBC AUTO: 89.2 FL (ref 79–97)
MONOCYTES # BLD AUTO: 0.86 10*3/MM3 (ref 0.1–0.9)
MONOCYTES NFR BLD AUTO: 8.5 % (ref 5–12)
NEUTROPHILS NFR BLD AUTO: 8.15 10*3/MM3 (ref 1.7–7)
NEUTROPHILS NFR BLD AUTO: 80.2 % (ref 42.7–76)
NRBC BLD AUTO-RTO: 0 /100 WBC (ref 0–0.2)
PLATELET # BLD AUTO: 504 10*3/MM3 (ref 140–450)
PMV BLD AUTO: 9.3 FL (ref 6–12)
POTASSIUM SERPL-SCNC: 4.3 MMOL/L (ref 3.5–5.2)
PROT SERPL-MCNC: 6.5 G/DL (ref 6–8.5)
RBC # BLD AUTO: 4.62 10*6/MM3 (ref 4.14–5.8)
SODIUM SERPL-SCNC: 135 MMOL/L (ref 136–145)
WBC # BLD AUTO: 10.16 10*3/MM3 (ref 3.4–10.8)

## 2021-02-08 PROCEDURE — 97116 GAIT TRAINING THERAPY: CPT

## 2021-02-08 PROCEDURE — 97166 OT EVAL MOD COMPLEX 45 MIN: CPT

## 2021-02-08 PROCEDURE — 63710000001 INSULIN ASPART PER 5 UNITS: Performed by: FAMILY MEDICINE

## 2021-02-08 PROCEDURE — 83615 LACTATE (LD) (LDH) ENZYME: CPT | Performed by: HOSPITALIST

## 2021-02-08 PROCEDURE — 82550 ASSAY OF CK (CPK): CPT | Performed by: HOSPITALIST

## 2021-02-08 PROCEDURE — 86140 C-REACTIVE PROTEIN: CPT | Performed by: HOSPITALIST

## 2021-02-08 PROCEDURE — 97162 PT EVAL MOD COMPLEX 30 MIN: CPT

## 2021-02-08 PROCEDURE — 85379 FIBRIN DEGRADATION QUANT: CPT | Performed by: HOSPITALIST

## 2021-02-08 PROCEDURE — 25010000002 ENOXAPARIN PER 10 MG: Performed by: HOSPITALIST

## 2021-02-08 PROCEDURE — 97530 THERAPEUTIC ACTIVITIES: CPT

## 2021-02-08 PROCEDURE — 94799 UNLISTED PULMONARY SVC/PX: CPT

## 2021-02-08 PROCEDURE — 25010000002 DEXAMETHASONE PER 1 MG: Performed by: FAMILY MEDICINE

## 2021-02-08 PROCEDURE — 82728 ASSAY OF FERRITIN: CPT | Performed by: HOSPITALIST

## 2021-02-08 PROCEDURE — 82962 GLUCOSE BLOOD TEST: CPT

## 2021-02-08 PROCEDURE — 80053 COMPREHEN METABOLIC PANEL: CPT | Performed by: HOSPITALIST

## 2021-02-08 PROCEDURE — 63710000001 INSULIN DETEMIR PER 5 UNITS: Performed by: FAMILY MEDICINE

## 2021-02-08 PROCEDURE — 82248 BILIRUBIN DIRECT: CPT | Performed by: HOSPITALIST

## 2021-02-08 PROCEDURE — 85384 FIBRINOGEN ACTIVITY: CPT | Performed by: HOSPITALIST

## 2021-02-08 PROCEDURE — 85025 COMPLETE CBC W/AUTO DIFF WBC: CPT | Performed by: HOSPITALIST

## 2021-02-08 RX ADMIN — SODIUM CHLORIDE, PRESERVATIVE FREE 10 ML: 5 INJECTION INTRAVENOUS at 09:44

## 2021-02-08 RX ADMIN — ALBUTEROL SULFATE 2 PUFF: 90 AEROSOL, METERED RESPIRATORY (INHALATION) at 19:14

## 2021-02-08 RX ADMIN — INSULIN ASPART 8 UNITS: 100 INJECTION, SOLUTION INTRAVENOUS; SUBCUTANEOUS at 11:22

## 2021-02-08 RX ADMIN — ENOXAPARIN SODIUM 40 MG: 40 INJECTION SUBCUTANEOUS at 21:05

## 2021-02-08 RX ADMIN — SODIUM CHLORIDE, PRESERVATIVE FREE 10 ML: 5 INJECTION INTRAVENOUS at 21:05

## 2021-02-08 RX ADMIN — LEVOTHYROXINE SODIUM 125 MCG: 125 TABLET ORAL at 04:25

## 2021-02-08 RX ADMIN — ASPIRIN 81 MG: 81 TABLET, COATED ORAL at 09:44

## 2021-02-08 RX ADMIN — BUDESONIDE AND FORMOTEROL FUMARATE DIHYDRATE 2 PUFF: 160; 4.5 AEROSOL RESPIRATORY (INHALATION) at 19:16

## 2021-02-08 RX ADMIN — IPRATROPIUM BROMIDE 2 PUFF: 17 AEROSOL, METERED RESPIRATORY (INHALATION) at 19:16

## 2021-02-08 RX ADMIN — PANTOPRAZOLE SODIUM 40 MG: 40 TABLET, DELAYED RELEASE ORAL at 04:25

## 2021-02-08 RX ADMIN — CETIRIZINE HYDROCHLORIDE 10 MG: 10 TABLET, FILM COATED ORAL at 09:44

## 2021-02-08 RX ADMIN — OXYCODONE HYDROCHLORIDE AND ACETAMINOPHEN 1000 MG: 500 TABLET ORAL at 09:44

## 2021-02-08 RX ADMIN — ATORVASTATIN CALCIUM 40 MG: 40 TABLET, FILM COATED ORAL at 09:44

## 2021-02-08 RX ADMIN — ALBUTEROL SULFATE 2 PUFF: 90 AEROSOL, METERED RESPIRATORY (INHALATION) at 10:25

## 2021-02-08 RX ADMIN — INSULIN DETEMIR 15 UNITS: 100 INJECTION, SOLUTION SUBCUTANEOUS at 21:06

## 2021-02-08 RX ADMIN — TAMSULOSIN HYDROCHLORIDE 0.4 MG: 0.4 CAPSULE ORAL at 09:44

## 2021-02-08 RX ADMIN — BARICITINIB 4 MG: 2 TABLET, FILM COATED ORAL at 09:44

## 2021-02-08 RX ADMIN — IPRATROPIUM BROMIDE 2 PUFF: 17 AEROSOL, METERED RESPIRATORY (INHALATION) at 16:00

## 2021-02-08 RX ADMIN — DEXAMETHASONE SODIUM PHOSPHATE 6 MG: 4 INJECTION, SOLUTION INTRA-ARTICULAR; INTRALESIONAL; INTRAMUSCULAR; INTRAVENOUS; SOFT TISSUE at 21:05

## 2021-02-08 RX ADMIN — BUDESONIDE AND FORMOTEROL FUMARATE DIHYDRATE 2 PUFF: 160; 4.5 AEROSOL RESPIRATORY (INHALATION) at 10:25

## 2021-02-08 RX ADMIN — THERA TABS 1 TABLET: TAB at 09:44

## 2021-02-08 RX ADMIN — ALBUTEROL SULFATE 2 PUFF: 90 AEROSOL, METERED RESPIRATORY (INHALATION) at 16:00

## 2021-02-08 RX ADMIN — DEXAMETHASONE SODIUM PHOSPHATE 6 MG: 4 INJECTION, SOLUTION INTRA-ARTICULAR; INTRALESIONAL; INTRAMUSCULAR; INTRAVENOUS; SOFT TISSUE at 09:44

## 2021-02-08 RX ADMIN — IPRATROPIUM BROMIDE 2 PUFF: 17 AEROSOL, METERED RESPIRATORY (INHALATION) at 10:25

## 2021-02-08 RX ADMIN — INSULIN ASPART 5 UNITS: 100 INJECTION, SOLUTION INTRAVENOUS; SUBCUTANEOUS at 09:44

## 2021-02-08 RX ADMIN — REMDESIVIR 100 MG: 100 INJECTION, POWDER, LYOPHILIZED, FOR SOLUTION INTRAVENOUS at 21:05

## 2021-02-08 RX ADMIN — INSULIN ASPART 10 UNITS: 100 INJECTION, SOLUTION INTRAVENOUS; SUBCUTANEOUS at 17:52

## 2021-02-08 NOTE — PLAN OF CARE
Goal Outcome Evaluation:  Plan of Care Reviewed With: patient     Outcome Summary: OT Eval completed on this date. Pt pleasant and agreeable to therapy. Bed Mobility: Mod I Transfers: CGA Grooming: SBA with set-up LBD: Supervision to don/doff socks Funct Mob: CGA - Pt O2 sats dropped to 89% while on Airvo. Pt educated on change of position in bed to side lying and prone. Pt demos decreased activity tolerance, decreased strength, decreased balance, and decreased safety awareness. Pt would benefit from continued skilled OT to address functional deficits. Recommend d/c home with HHOT.

## 2021-02-08 NOTE — THERAPY EVALUATION
Patient Name: Alfie Joseph  : 1945    MRN: [id number]                              Today's Date: 2021       Admit Date: 2/3/2021    Visit Dx:     ICD-10-CM ICD-9-CM   1. Pneumonia due to COVID-19 virus  U07.1 480.8    J12.82 079.89   2. Acute respiratory failure with hypoxia (CMS/[facility])  J96.01 518.81   3. Impaired mobility and activities of daily living  Z74.09 V49.89    Z78.9    4. Impaired functional mobility, balance, gait, and endurance  Z74.09 V49.89     Patient Active Problem List   Diagnosis   • Aortic valve disorder   • Coronary artery disease involving native coronary artery of native heart without angina pectoris   • Hyperlipidemia   • COVID-19   • Pneumonia due to COVID-19 virus   • Acute respiratory failure (CMS/[facility])     Past Medical History:   Diagnosis Date   • Acquired hypothyroidism    • Acute bronchitis    • Allergic rhinitis    • Aortic valve disorder     Aortic valve disorder - AVR   • Aortic valve sclerosis    • Benign prostatic hyperplasia    • Coronary arteriosclerosis    • Coronary atherosclerosis of native coronary artery     Coronary atherosclerosis of native coronary artery - CABG   • Diabetes (CMS/[facility])    • Exanthematous disorder    • GERD (gastroesophageal reflux disease)    • Hemorrhoids     Hemorrhoids - internal & external   • History of echocardiogram 10/26/2015    Echocardiogram W/ color flow 31035 (2) - Limited 2D echocardiogram. Normal prosthetic AV.   • History of echocardiogram 2013    Echocardiogram W/O color flow 68183 (3) - Mildly depressed LV systolic function with EF of 45-50%. Moderate CLVH. Grade I diastolic dysfunction of the left ventricular myocardium. No evidence of pericardial effusion.   • Hyperlipidemia    • Hypothyroidism    • Infection of skin and subcutaneous tissue     Infection of skin AND/OR subcutaneous tissue   • Tinnitus    • Type 2 diabetes mellitus (CMS/[facility])    • Urinary tract infectious disease      Past Surgical History:   Procedure  Laterality Date   • CARDIAC CATHETERIZATION  10/23/2013    Cardiac cath 06616 (1) - Multivessel coronary artery disease. Moderate AV stenosis. Mild aortic valve regurgitation.   • CORONARY ARTERY BYPASS GRAFT     • ENDOSCOPY  01/25/2010    Colon endoscopy 08085 (1) - internal external hemorrhoids. Otherwise normal   • GALLBLADDER SURGERY     • HYDROCELECTOMY      Remove hydrocele (1)   • INJECTION OF MEDICATION  05/21/2012    Kenalog (1) - ELLI PEPE ([facility] [employer])     General Information     Row Name 02/08/21 1610          Physical Therapy Time and Intention    Document Type  evaluation  -     Mode of Treatment  individual therapy;physical therapy  -     Row Name 02/08/21 1610          General Information    Patient Profile Reviewed  yes  -     Prior Level of Function  independent:;gait;transfer;ADL's;yard work  -     Existing Precautions/Restrictions  fall;oxygen therapy device and L/min  -     Row Name 02/08/21 1610          Living Environment    Lives With  spouse  -     Row Name 02/08/21 1610          Home Main Entrance    Number of Stairs, Main Entrance  three  -     Stair Railings, Main Entrance  none can hold to door  -     Row Name 02/08/21 1610          Cognition    Orientation Status (Cognition)  oriented x 4  -     Row Name 02/08/21 1610          Safety Issues, Functional Mobility    Impairments Affecting Function (Mobility)  endurance/activity tolerance;shortness of breath  -       User Key  (r) = Recorded By, (t) = Taken By, (c) = Cosigned By    Initials Name Provider Type    STEPHANIE Silvia Gonzalez PT Physical Therapist        Mobility     Row Name 02/08/21 1610          Bed Mobility    Bed Mobility  rolling left;rolling right;scooting/bridging  -     Rolling Left [place] (Bed Mobility)  modified independence  -STEPHANIE     Rolling Right [place] (Bed Mobility)  modified independence  -STEPHANIE     Scooting/Bridging [place] (Bed Mobility)  modified independence  -STEPHANIE     Supine-Sit  [place] (Bed Mobility)  modified independence  -     Sit-Supine [place] (Bed Mobility)  independent  -STEPHANIE     Row Name 02/08/21 1610          Bed-Chair Transfer    Bed-Chair [place] (Transfers)  contact guard  -STEPHANIE     Row Name 02/08/21 1610          Sit-Stand Transfer    Sit-Stand [place] (Transfers)  standby assist  -[facility] 02/08/21 1610          Gait/Stairs (Locomotion)    [place] Level (Gait)  contact guard  -     Assistive Device (Gait)  -- no gait device  -     Distance in Feet (Gait)  5ft  -       User Key  (r) = Recorded By, (t) = Taken By, (c) = Cosigned By    Initials Name Provider Type    STEPHANIE Silvia Gonzalez PT Physical Therapist        Obj/Interventions     Row Name 02/08/21 1610          Range of Motion Comprehensive    Comment, General Range of Motion  AROm WFL all extremites  -STEPHANIE     Row Name 02/08/21 1610          Strength Comprehensive (MMT)    Comment, General Manual Muscle Testing (MMT) Assessment  BUE: grossly 5/5; BLE: 4+/5 ankles/feet , knees, hips  -STEPHANIE     Row Name 02/08/21 1610          Balance    Balance Assessment  sitting static balance;sitting dynamic balance;standing static balance;standing dynamic balance  -     Static Sitting Balance  WFL  -     Dynamic Sitting Balance  WFL  -     Static Standing Balance  WFL  -     Dynamic Standing Balance  mild impairment  -STEPHANIE     Row Name 02/08/21 1610          Sensory Assessment (Somatosensory)    Sensory Assessment (Somatosensory)  sensation intact;UE sensation intact;LE sensation intact to light touch  -       User Key  (r) = Recorded By, (t) = Taken By, (c) = Cosigned By    Initials Name Provider Type    Silvia Brice PT Physical Therapist        Goals/Plan     Row Name 02/08/21 1610          Transfer Goal 1 (PT)    Activity/Assistive Device (Transfer Goal 1, PT)  sit-to-stand/stand-to-sit;bed-to-chair/chair-to-bed  -     [place] Level/Cues Needed (Transfer Goal 1, PT)  standby  assist;independent O2 sats will not drop below 92%  -STEPHANIE     Time Frame (Transfer Goal 1, PT)  by discharge  -     Progress/Outcome (Transfer Goal 1, PT)  goal not met  -     Row Name 02/08/21 1610          Gait Training Goal 1 (PT)    Activity/Assistive Device (Gait Training Goal 1, PT)  gait (walking locomotion)  -     [place] Level (Gait Training Goal 1, PT)  contact guard assist;standby assist  -     Distance (Gait Training Goal 1, PT)  30ft or more x3 O2 sats will not drop below 92%  -STEPHANIE     Time Frame (Gait Training Goal 1, PT)  by discharge  -STEPHANIE     Progress/Outcome (Gait Training Goal 1, PT)  goal not met  -     Row Name 02/08/21 1610          ROM Goal 1 (PT)    ROM Goal 1 (PT)  Pt will tolerate LE exercises OOB in chair with VSS  -     Time Frame (ROM Goal 1, PT)  by discharge  -     Progress/Outcome (ROM Goal 1, PT)  goal not met  -       User Key  (r) = Recorded By, (t) = Taken By, (c) = Cosigned By    Initials Name Provider Type    Silvia Brice, PT Physical Therapist        Clinical Impression     Row Name 02/08/21 1610          Pain Scale: Numbers Pre/Post-Treatment    Pretreatment Pain Rating  0/10 - no pain  -     Posttreatment Pain Rating  0/10 - no pain  -     Row Name 02/08/21 1610          Plan of Care Review    Plan of Care Reviewed With  patient  -     Outcome Summary  PT evaluation completed. Pt pleasant and agreeable to to therapy. Pt sitting with HOB elevated on airvo. Pt independent with bed mobility and transferred sit to stand to sit with SBA. Pt ambulated 5ft by bedside with CGA. O2 sats EOB dropped to 84% on airvo and recovered to 94% in 45 seconds once pt returned to supine. Standing O2 sats initially 96% then gradually dropped to 92%. PT had pt to return to fowlers and O2 sats returned to upper 90's. Function limited by decreased strength, balance, and tolerance for functional moiblity and activiites. Pt will benefit from PT to regain lost function as pt  improves medically. Anticipate home with assistance at discharge and may need  PT if pt discharged prior to goals being met.  -     Row Name 02/08/21 1610          Therapy Assessment/Plan (PT)    Patient/Family Therapy Goals Statement (PT)  return to OF  -     Rehab Potential (PT)  good, to achieve stated therapy goals  -     Criteria for Skilled Interventions Met (PT)  yes;skilled treatment is necessary;meets criteria  -     Predicted Duration of Therapy Intervention (PT)  until discharge or goals met  -     Row Name 02/08/21 1610          Vital Signs    Pre Systolic BP Rehab  127  -STEPHANIE     Pre Treatment Diastolic BP  58  -STEPHANIE     Pretreatment Heart Rate (beats/min)  65  -STEPHANIE     Intratreatment Heart Rate (beats/min)  66  -STEPHANIE     Posttreatment Heart Rate (beats/min)  67  -STEPHANIE     Pre SpO2 (%)  95  -STEPHANIE     O2 Delivery Pre Treatment  other (see comments) airvo  -STEPHANIE     Intra SpO2 (%)  84  -STEPHANIE     O2 Delivery Intra Treatment  other (see comments) airvo  -STEPHANIE     Post SpO2 (%)  95  -STEPHANIE     Pre Patient Position  Supine  -STEPHANIE     Intra Patient Position  Sitting  -     Post Patient Position  Supine  -STEPHANIE     Recovery Time  45s  -     Row Name 02/08/21 1610          Positioning and Restraints    Pre-Treatment Position  in bed  -STEPHANIE     Post Treatment Position  bed  -STEPHANIE     In Bed  fowlers;call light within reach;encouraged to call for assist;with [facility]  -       User Key  (r) = Recorded By, (t) = Taken By, (c) = Cosigned By    Initials Name Provider Type    Silvia Brice, PT Physical Therapist        Outcome Measures     Row Name 02/08/21 1610          How much help from another person do you currently need...    Turning from your back to your side while in flat bed without using bedrails?  4  -STEPHANIE     Moving from lying on back to sitting on the side of a flat bed without bedrails?  4  -STEPHANIE     Moving to and from a bed to a chair (including a wheelchair)?  3  -STEPHANIE     Standing up from a chair using your arms  (e.g., wheelchair, bedside chair)?  3  -     Climbing 3-5 steps with a railing?  3  -STEPHANIE     To walk in hospital room?  3  -     AM-PAC 6 Clicks Score (PT)  20  -     Row Name 02/08/21 1610          Functional Assessment    Outcome Measure Options  AM-PAC 6 Clicks Basic Mobility (PT)  -       User Key  (r) = Recorded By, (t) = Taken By, (c) = Cosigned By    Initials Name Provider Type    STEPHANIE Silvia Gonzalez PT Physical Therapist        Physical Therapy Education                 Title: PT OT SLP Therapies (In Progress)     Topic: Physical Therapy (In Progress)     Point: Mobility training (In Progress)     Learning Progress Summary           Patient Acceptance, E, NR by  at 2/8/2021 1653                   Point: Home exercise program (Not Started)     Learner Progress:  Not documented in this visit.          Point: Body mechanics (In Progress)     Learning Progress Summary           Patient Acceptance, E, NR by  at 2/8/2021 1653                   Point: Precautions (In Progress)     Learning Progress Summary           Patient Acceptance, E, NR by  at 2/8/2021 1653                               User Key     Initials Effective Dates Name Provider Type Discipline     04/03/18 -  Silvia Gonzalez, PT Physical Therapist PT              PT Recommendation and Plan  Planned Therapy Interventions (PT): balance training, bed mobility training, gait training, home exercise program, strengthening, stair training, transfer training  Plan of Care Reviewed With: patient  Outcome Summary: PT evaluation completed. Pt pleasant and agreeable to to therapy. Pt sitting with HOB elevated on airvo. Pt independent with bed mobility and transferred sit to stand to sit with SBA. Pt ambulated 5ft by bedside with CGA. O2 sats EOB dropped to 84% on airvo and recovered to 94% in 45 seconds once pt returned to supine. Standing O2 sats initially 96% then gradually dropped to 92%. PT had pt to return to fowlers and O2 sats returned to  upper 90's. Function limited by decreased strength, balance, and tolerance for functional moiblity and activiites. Pt will benefit from PT to regain lost function as pt improves medically. Anticipate home with assistance at discharge and may need  PT if pt discharged prior to goals being met.     Time Calculation:   PT Charges     Row Name 02/08/21 1734             Time Calculation    Start Time  1610  -STEPHANIE      Stop Time  1705  -      Time Calculation (min)  55 min  -      PT Received On  02/08/21  -      PT Goal Re-Cert Due Date  02/20/21  -         Time Calculation- PT    Total Timed Code Minutes- PT  40 minute(s)  -        User Key  (r) = Recorded By, (t) = Taken By, (c) = Cosigned By    Initials Name Provider Type    STEPHANIE Silvia Gonzalez PT Physical Therapist        Therapy Charges for Today     Code Description Service Date Service Provider Modifiers Qty    65530853765  PT EVAL MOD COMPLEXITY 1 2/8/2021 Silvia Gonzalez, PT GP 1    [id number] HC GAIT TRAINING EA 15 MIN 2/8/2021 Silvia Gonzalez, PT GP 1    [id number]  PT THERAPEUTIC ACT EA 15 MIN 2/8/2021 Silvia Gonzalez, PT GP 2          PT G-Codes  Outcome Measure Options: AM-PAC 6 Clicks Basic Mobility (PT)  AM-PAC 6 Clicks Score (PT): 20  AM-PAC 6 Clicks Score (OT): 20    Silvia Gonzalez PT  2/8/2021

## 2021-02-08 NOTE — PLAN OF CARE
Pt on 60L airvo, tolerating well, SOA on exertion/activity. RN spoke with wife twice this shift for updates. Vitals stable.

## 2021-02-08 NOTE — PROGRESS NOTES
[facility] Medicine Services  INPATIENT PROGRESS NOTE    Length of Stay: 5  Date of Admission: 2/3/2021  Primary Care Physician: Clifford Weston MD    Subjective   Chief Complaint: Shortness of breath  HPI: Patient reports he feels like his breathing is stable.  He denies any new concerns.  He is still requiring the Airvo but tolerating this well.    Review of Systems   Constitutional: Negative for chills and fever.   HENT: Negative for congestion.    Respiratory: Positive for shortness of breath.    Cardiovascular: Negative for chest pain.   Gastrointestinal: Negative for abdominal pain, diarrhea, nausea and vomiting.   Genitourinary: Negative for dysuria.   Musculoskeletal: Negative for arthralgias.   Skin: Negative for rash.   Neurological: Negative for dizziness and weakness.   Psychiatric/Behavioral: Negative for sleep disturbance. The patient is not nervous/anxious.    All other systems reviewed and are negative.     All pertinent negatives and positives are as above. All other systems have been reviewed and are negative unless otherwise stated.     Objective    Temp:  [96.5 °F (35.8 °C)-97.5 °F (36.4 °C)] 97.5 °F (36.4 °C)  Heart Rate:  [57-78] 57  Resp:  [18-20] 18  BP: (111-144)/(58-80) 127/58    Physical Exam  Constitutional:       General: He is not in acute distress.     Appearance: He is not toxic-appearing.   HENT:      Head: Normocephalic and atraumatic.      Right Ear: External ear normal.      Left Ear: External ear normal.      Nose: Nose normal.      Mouth/Throat:      Mouth: Mucous membranes are moist.      Pharynx: Oropharynx is clear.   Eyes:      Conjunctiva/sclera: Conjunctivae normal.   Neck:      Musculoskeletal: Neck supple.   Cardiovascular:      Rate and Rhythm: Normal rate and regular rhythm.      Pulses: Normal pulses.      Heart sounds: Normal heart sounds.   Pulmonary:      Comments: Diminished breath sounds bilaterally with decreased  aeration  Abdominal:      General: Bowel sounds are normal.      Palpations: Abdomen is soft.      Tenderness: There is no abdominal tenderness.   Musculoskeletal:         General: No swelling.   Skin:     General: Skin is warm.      Capillary Refill: Capillary refill takes less than 2 seconds.   Neurological:      General: No focal deficit present.      Mental Status: He is alert and oriented to person, place, and time. Mental status is at baseline.      Coordination: Coordination normal.   Psychiatric:         Mood and Affect: Mood normal.         Behavior: Behavior normal.         Results Review:  I have reviewed the labs, radiology results, and diagnostic studies.    Laboratory Data:   Results from last 7 days   Lab Units 02/08/21  0645 02/07/21  0656 02/06/21  0709   SODIUM mmol/L 135* 136 135*   POTASSIUM mmol/L 4.3 5.0 3.9   CHLORIDE mmol/L 100 100 101   CO2 mmol/L 25.0 26.0 23.0   BUN mg/dL 20 17 17   CREATININE mg/dL 0.76 0.73* 0.62*   GLUCOSE mg/dL 270* 271* 253*   CALCIUM mg/dL 8.6 8.9 8.8   BILIRUBIN mg/dL 0.5 0.6 0.6   ALK PHOS U/L 63 70 71   ALT (SGPT) U/L 32 37 39   AST (SGOT) U/L 23 26 36   ANION GAP mmol/L 10.0 10.0 11.0     Estimated Creatinine Clearance: 98.8 mL/min (by C-G formula based on SCr of 0.76 mg/dL).  Results from last 7 days   Lab Units 02/03/21  0944   MAGNESIUM mg/dL 2.1         Results from last 7 days   Lab Units 02/08/21  0645 02/07/21  0656 02/06/21  0709 02/05/21  0307 02/04/21  0643   WBC 10*3/mm3 10.16 9.68 10.38 10.23 9.92   HEMOGLOBIN g/dL 14.7 14.8 14.9 15.2 15.3   HEMATOCRIT % 41.2 41.9 41.3 42.2 44.0   PLATELETS 10*3/mm3 504* 497* 420 434 425           Culture Data:   No results found for: BLOODCX  No results found for: URINECX  No results found for: RESPCX  No results found for: WOUNDCX  No results found for: STOOLCX  No components found for: BODYFLD    Radiology Data:   Imaging Results (Last 24 Hours)     ** No results found for the last 24 hours. **          I have  reviewed the patient's current medications.     Assessment/Plan     Active Problems:    Pneumonia due to COVID-19 virus    Acute respiratory failure (CMS/[facility])    Plan:  -Continue supplemental oxygen wean as tolerated, for now continue with Airvo  -Continue Decadron twice daily and remdesivir  -Continue baricitinib as well  -Bronchodilators been ordered  -Expect blood sugars to be elevated given steroid use, continue current sliding scale orders and glucose checks.  Increase Levemir tonight to help with glycemic control  -Add an incentive spirometry.  -Continue home medications as ordered  -We will order PT and OT when his O2 requirements decreased  -DVT prophylaxis with Lovenox  -CODE STATUS: Full    The patient was evaluated during the global COVID-19 pandemic, and the diagnosis was suspected/considered upon their initial presentation.  Evaluation, treatment, and testing were consistent with current guidelines for patients who present with complaints or symptoms that may be related to COVID-19.      Discharge Planning: In process.  May be a candidate to consider for LTAC so he can also get pulmonary rehab    Alfie Willis MD

## 2021-02-08 NOTE — THERAPY EVALUATION
Patient Name: Alfie Joseph  : 1945    MRN: [id number]                              Today's Date: 2021       Admit Date: 2/3/2021    Visit Dx:     ICD-10-CM ICD-9-CM   1. Pneumonia due to COVID-19 virus  U07.1 480.8    J12.82 079.89   2. Acute respiratory failure with hypoxia (CMS/[facility])  J96.01 518.81   3. Impaired mobility and activities of daily living  Z74.09 V49.89    Z78.9      Patient Active Problem List   Diagnosis   • Aortic valve disorder   • Coronary artery disease involving native coronary artery of native heart without angina pectoris   • Hyperlipidemia   • COVID-19   • Pneumonia due to COVID-19 virus   • Acute respiratory failure (CMS/HCC)     Past Medical History:   Diagnosis Date   • Acquired hypothyroidism    • Acute bronchitis    • Allergic rhinitis    • Aortic valve disorder     Aortic valve disorder - AVR   • Aortic valve sclerosis    • Benign prostatic hyperplasia    • Coronary arteriosclerosis    • Coronary atherosclerosis of native coronary artery     Coronary atherosclerosis of native coronary artery - CABG   • Diabetes (CMS/HCC)    • Exanthematous disorder    • GERD (gastroesophageal reflux disease)    • Hemorrhoids     Hemorrhoids - internal & external   • History of echocardiogram 10/26/2015    Echocardiogram W/ color flow 77222 (2) - Limited 2D echocardiogram. Normal prosthetic AV.   • History of echocardiogram 2013    Echocardiogram W/O color flow 78466 (3) - Mildly depressed LV systolic function with EF of 45-50%. Moderate CLVH. Grade I diastolic dysfunction of the left ventricular myocardium. No evidence of pericardial effusion.   • Hyperlipidemia    • Hypothyroidism    • Infection of skin and subcutaneous tissue     Infection of skin AND/OR subcutaneous tissue   • Tinnitus    • Type 2 diabetes mellitus (CMS/HCC)    • Urinary tract infectious disease      Past Surgical History:   Procedure Laterality Date   • CARDIAC CATHETERIZATION  10/23/2013    Cardiac cath 58996 (1)  - Multivessel coronary artery disease. Moderate AV stenosis. Mild aortic valve regurgitation.   • CORONARY ARTERY BYPASS GRAFT     • ENDOSCOPY  01/25/2010    Colon endoscopy 05262 (1) - internal external hemorrhoids. Otherwise normal   • GALLBLADDER SURGERY     • HYDROCELECTOMY      Remove hydrocele (1)   • INJECTION OF MEDICATION  05/21/2012    Juan Carlos (1) - ELLI PEPE ([facility] [employer])     General Information     [facility] Name 02/08/21 1510          OT Time and Intention    Document Type  evaluation  -     Mode of Treatment  individual therapy;occupational therapy  -[facility] Name 02/08/21 1510          General Information    Patient Profile Reviewed  yes  -     Prior Level of Function  independent:;all household mobility;community mobility;gait;transfer;cooking;cleaning;ADL's;driving  -     Existing Precautions/Restrictions  fall;oxygen therapy device and L/min  -[facility] Name 02/08/21 1510          Living Environment    Lives With  spouse  -[facility] Name 02/08/21 1510          Home Main Entrance    Number of Stairs, Main Entrance  three  -     Stair Railings, Main Entrance  none  -[facility] Name 02/08/21 1510          Stairs Within Home, Primary    Stairs, Within Home, Primary  Has tub/shower, and walk-in shower  -[facility] Name 02/08/21 1510          Cognition    Orientation Status (Cognition)  oriented x 4  -[facility] Name 02/08/21 1510          Safety Issues, Functional Mobility    Safety Issues Affecting Function (Mobility)  safety precautions follow-through/compliance;safety precaution awareness;problem-solving;insight into deficits/self-awareness;awareness of need for assistance  [facility]     Impairments Affecting Function (Mobility)  balance;endurance/activity tolerance;strength;shortness of breath  [facility]       User Key  (r) = Recorded By, (t) = Taken By, (c) = Cosigned By    Initials Name Provider Type     Des Marquez OT Occupational Therapist          Mobility/ADL's     Row Name 02/08/21 1510           Bed Mobility    Bed Mobility  supine-sit;sit-supine  -     Supine-Sit [place] (Bed Mobility)  modified independence  -     Sit-Supine [place] (Bed Mobility)  modified independence  -     Assistive Device (Bed Mobility)  head of bed elevated  -JH     Row Name 02/08/21 1510          Transfers    Transfers  sit-stand transfer  -     Sit-Stand [place] (Transfers)  contact guard  -JH     Row Name 02/08/21 1510          Functional Mobility    Functional Mobility- Ind. Level  contact guard assist  -     Functional Mobility- Safety Issues  supplemental O2  -[facility] Name 02/08/21 1510          Activities of Daily Living    BADL Assessment/Intervention  lower body dressing;grooming  -JH     Row Name 02/08/21 1510          Lower Body Dressing Assessment/Training    [place] Level (Lower Body Dressing)  doff;don;socks;supervision;set up  -     Position (Lower Body Dressing)  edge of bed sitting  -JH     Row Name 02/08/21 1510          Grooming Assessment/Training    [place] Level (Grooming)  wash face, hands;supervision;set up  -     Position (Grooming)  edge of bed sitting  -       User Key  (r) = Recorded By, (t) = Taken By, (c) = Cosigned By    Initials Name Provider Type     Des Marquez OT Occupational Therapist        Obj/Interventions     [facility] Name 02/08/21 1510          Sensory Assessment (Somatosensory)    Sensory Assessment (Somatosensory)  UE sensation intact;sensation intact  -JH     Row Name 02/08/21 1510          Vision Assessment/Intervention    Visual Impairment/Limitations  corrective lenses for reading  -[facility] Name 02/08/21 1510          Range of Motion Comprehensive    General Range of Motion  no range of motion deficits identified  -JH     Row Name 02/08/21 1510          Strength Comprehensive (MMT)    Comment, General Manual Muscle Testing (MMT) Assessment  BUE MMT STrength 4/5 Grossly  -       User Key  (r) = Recorded By, (t) = Taken By, (c) =  Cosigned By    Initials Name Provider Type     Des Marquez OT Occupational Therapist        Goals/Plan     Row Name 02/08/21 1510          Bed Mobility Goal 1 (OT)    Activity/Assistive Device (Bed Mobility Goal 1, OT)  sit to supine/supine to sit  -     [place] Level/Cues Needed (Bed Mobility Goal 1, OT)  independent  -     Time Frame (Bed Mobility Goal 1, OT)  long term goal (LTG);by discharge  -     Progress/Outcomes (Bed Mobility Goal 1, OT)  goal not met  -[facility] Name 02/08/21 1510          Transfer Goal 1 (OT)    Activity/Assistive Device (Transfer Goal 1, OT)  sit-to-stand/stand-to-sit;toilet  -     [place] Level/Cues Needed (Transfer Goal 1, OT)  standby assist;verbal cues required;tactile cues required  -     Time Frame (Transfer Goal 1, OT)  long term goal (LTG);by discharge  -     Progress/Outcome (Transfer Goal 1, OT)  goal not met  -[facility] Name 02/08/21 1510          Bathing Goal 1 (OT)    Activity/Device (Bathing Goal 1, OT)  lower body bathing  -     [place] Level/Cues Needed (Bathing Goal 1, OT)  standby assist;verbal cues required;tactile cues required  -     Time Frame (Bathing Goal 1, OT)  long term goal (LTG);by discharge  -     Progress/Outcomes (Bathing Goal 1, OT)  goal not met  -[facility] Name 02/08/21 1510          Dressing Goal 1 (OT)    Activity/Device (Dressing Goal 1, OT)  lower body dressing  -     [place]/Cues Needed (Dressing Goal 1, OT)  standby assist;verbal cues required;tactile cues required  -     Time Frame (Dressing Goal 1, OT)  long term goal (LTG);by discharge  -     Progress/Outcome (Dressing Goal 1, OT)  goal not met  -[facility] Name 02/08/21 1510          Toileting Goal 1 (OT)    Activity/Device (Toileting Goal 1, OT)  toileting skills, all  -     [place] Level/Cues Needed (Toileting Goal 1, OT)  standby assist;verbal cues required;tactile cues required  -     Time Frame (Toileting Goal 1, OT)  long term  goal (LTG);by discharge  -     Progress/Outcome (Toileting Goal 1, OT)  goal not met  -     Row Name 02/08/21 1510          Therapy Assessment/Plan (OT)    Planned Therapy Interventions (OT)  activity tolerance training;functional balance retraining;occupation/activity based interventions;ROM/therapeutic exercise;strengthening exercise;transfer/mobility retraining;patient/caregiver education/training;neuromuscular control/coordination retraining;BADL retraining  -       User Key  (r) = Recorded By, (t) = Taken By, (c) = Cosigned By    Initials Name Provider Type     Des Marquez OT Occupational Therapist        Clinical Impression     Row Name 02/08/21 1510          Pain Assessment    Additional Documentation  Pain Scale: Numbers Pre/Post-Treatment (Group)  -     Row Name 02/08/21 1510          Pain Scale: Numbers Pre/Post-Treatment    Pretreatment Pain Rating  0/10 - no pain  -     Posttreatment Pain Rating  0/10 - no pain  -     Row Name 02/08/21 1510          Plan of Care Review    Plan of Care Reviewed With  patient  -     Outcome Summary  OT Eval completed on this date. Pt pleasant and agreeable to therapy. Bed Mobility: Mod I Transfers: CGA Grooming: SBA with set-up LBD: Supervision to don/doff socks Funct Mob: CGA - Pt O2 sats dropped to 89% while on Airvo. Pt educated on change of position in bed to side lying and prone. Pt demos decreased activity tolerance, decreased strength, decreased balance, and decreased safety awareness. Pt would benefit from continued skilled OT to address functional deficits. Recommend d/c home with HHOT.  -     Row Name 02/08/21 1510          Therapy Assessment/Plan (OT)    Rehab Potential (OT)  good, to achieve stated therapy goals  -     Criteria for Skilled Therapeutic Interventions Met (OT)  yes;meets criteria;skilled treatment is necessary  -     Therapy Frequency (OT)  other (see comments) 5-7 days per week  -     Predicted Duration of Therapy  Intervention (OT)  Until goals met or d/c  -     Row Name 02/08/21 1510          Therapy Plan Review/Discharge Plan (OT)    Anticipated Discharge Disposition (OT)  home with home health  -     Row Name 02/08/21 1510          Vital Signs    Pre Systolic BP Rehab  135  -JH     Pre Treatment Diastolic BP  90  -JH     Pretreatment Heart Rate (beats/min)  64  -JH     Intratreatment Heart Rate (beats/min)  76  -JH     Posttreatment Heart Rate (beats/min)  67  -JH     Pre SpO2 (%)  95  -JH     O2 Delivery Pre Treatment  other (see comments) Airvo  -JH     Intra SpO2 (%)  89  -JH     O2 Delivery Intra Treatment  other (see comments) Airvo  -JH     Post SpO2 (%)  96  -JH     O2 Delivery Post Treatment  other (see comments) Airvo  -JH     Pre Patient Position  Supine  -     Intra Patient Position  Sitting  -     Post Patient Position  Supine  -     Row Name 02/08/21 1510          Positioning and Restraints    Pre-Treatment Position  in bed  -     Post Treatment Position  bed  -     In Bed  supine;call light within reach;encouraged to call for assist;exit alarm on;side rails up x2;notified [facility]  -       User Key  (r) = Recorded By, (t) = Taken By, (c) = Cosigned By    Initials Name Provider Type     Des Marquez, VAN Occupational Therapist        Outcome Measures     Row Name 02/08/21 1510          How much help from another is currently needed...    Putting on and taking off regular lower body clothing?  3  -JH     Bathing (including washing, rinsing, and drying)  3  -JH     Toileting (which includes using toilet bed pan or urinal)  3  -JH     Putting on and taking off regular upper body clothing  3  -     Taking care of personal grooming (such as brushing teeth)  4  -JH     Eating meals  4  -     AM-PAC 6 Clicks Score (OT)  20  -     Row Name 02/08/21 1510          Functional Assessment    Outcome Measure Options  AM-PAC 6 Clicks Daily Activity (OT)  -       User Key  (r) = Recorded By, (t) = Taken  By, (c) = Cosigned By    Initials Name Provider Type     Des Marquez OT Occupational Therapist        Occupational Therapy Education                 Title: PT OT SLP Therapies (In Progress)     Topic: Occupational Therapy (In Progress)     Point: ADL training (Not Started)     Description:   Instruct learner(s) on proper safety adaptation and remediation techniques during self care or transfers.   Instruct in proper use of assistive devices.              Learner Progress:  Not documented in this visit.          Point: Home exercise program (Not Started)     Description:   Instruct learner(s) on appropriate technique for monitoring, assisting and/or progressing therapeutic exercises/activities.              Learner Progress:  Not documented in this visit.          Point: Precautions (Done)     Description:   Instruct learner(s) on prescribed precautions during self-care and functional transfers.              Learning Progress Summary           Patient Acceptance, E, VU by  at 2/8/2021 1527    Comment: Pt educated on role of OT, d/c recs, and POC                   Point: Body mechanics (Not Started)     Description:   Instruct learner(s) on proper positioning and spine alignment during self-care, functional mobility activities and/or exercises.              Learner Progress:  Not documented in this visit.                      User Key     Initials Effective Dates Name Provider Type Discipline     09/10/19 -  Des Marquez OT Occupational Therapist OT              OT Recommendation and Plan  Planned Therapy Interventions (OT): activity tolerance training, functional balance retraining, occupation/activity based interventions, ROM/therapeutic exercise, strengthening exercise, transfer/mobility retraining, patient/caregiver education/training, neuromuscular control/coordination retraining, BADL retraining  Therapy Frequency (OT): other (see comments)(5-7 days per week)  Plan of Care Review  Plan of Care  Reviewed With: patient  Outcome Summary: OT Eval completed on this date. Pt pleasant and agreeable to therapy. Bed Mobility: Mod I Transfers: CGA Grooming: SBA with set-up LBD: Supervision to don/doff socks Funct Mob: CGA - Pt O2 sats dropped to 89% while on Airvo. Pt educated on change of position in bed to side lying and prone. Pt demos decreased activity tolerance, decreased strength, decreased balance, and decreased safety awareness. Pt would benefit from continued skilled OT to address functional deficits. Recommend d/c home with HHOT.     Time Calculation:   Time Calculation- OT     Row Name 02/08/21 1643             Time Calculation- OT    OT Start Time  1510  -      OT Stop Time  1548  -      OT Time Calculation (min)  38 min  -      OT Received On  02/08/21  -      OT Goal Re-Cert Due Date  02/21/21  -        User Key  (r) = Recorded By, (t) = Taken By, (c) = Cosigned By    Initials Name Provider Type     Des Marquez OT Occupational Therapist        Therapy Charges for Today     Code Description Service Date Service Provider Modifiers Qty    [id number]  OT EVAL MOD COMPLEXITY 3 2/8/2021 Des Marquez OT GO 1               Des Marquez OT  2/8/2021

## 2021-02-08 NOTE — PLAN OF CARE
Problem: Adult Inpatient Plan of Care  Goal: Plan of Care Review  Recent Flowsheet Documentation  Taken 2/8/2021 1610 by Silvia Gonzalez PT  Plan of Care Reviewed With: patient  Outcome Summary: PT evaluation completed. Pt pleasant and agreeable to to therapy. Pt sitting with HOB elevated on airvo. Pt independent with bed mobility and transferred sit to stand to sit with SBA. Pt ambulated 5ft by bedside with CGA. O2 sats EOB dropped to 84% on airvo and recovered to 94% in 45 seconds once pt returned to supine. Standing O2 sats initially 96% then gradually dropped to 92%. PT had pt to return to fowlers and O2 sats returned to upper 90's. Function limited by decreased strength, balance, and tolerance for functional moiblity and activiites. Pt will benefit from PT to regain lost function as pt improves medically. Anticipate home with assistance at discharge and may need HH PT if pt discharged prior to goals being met.   Goal Outcome Evaluation:  Plan of Care Reviewed With: patient     Outcome Summary: PT evaluation completed. Pt pleasant and agreeable to to therapy. Pt sitting with HOB elevated on airvo. Pt independent with bed mobility and transferred sit to stand to sit with SBA. Pt ambulated 5ft by bedside with CGA. O2 sats EOB dropped to 84% on airvo and recovered to 94% in 45 seconds once pt returned to supine. Standing O2 sats initially 96% then gradually dropped to 92%. PT had pt to return to fowlers and O2 sats returned to upper 90's. Function limited by decreased strength, balance, and tolerance for functional moiblity and activiites. Pt will benefit from PT to regain lost function as pt improves medically. Anticipate home with assistance at discharge and may need HH PT if pt discharged prior to goals being met.

## 2021-02-09 ENCOUNTER — APPOINTMENT (OUTPATIENT)
Dept: GENERAL RADIOLOGY | Facility: HOSPITAL | Age: 76
End: 2021-02-09

## 2021-02-09 LAB
ALBUMIN SERPL-MCNC: 3 G/DL (ref 3.5–5.2)
ALBUMIN/GLOB SERPL: 0.8 G/DL
ALP SERPL-CCNC: 69 U/L (ref 39–117)
ALT SERPL W P-5'-P-CCNC: 31 U/L (ref 1–41)
ANION GAP SERPL CALCULATED.3IONS-SCNC: 9 MMOL/L (ref 5–15)
AST SERPL-CCNC: 22 U/L (ref 1–40)
BASOPHILS # BLD AUTO: 0.06 10*3/MM3 (ref 0–0.2)
BASOPHILS NFR BLD AUTO: 0.6 % (ref 0–1.5)
BILIRUB CONJ SERPL-MCNC: 0.2 MG/DL (ref 0–0.3)
BILIRUB SERPL-MCNC: 0.5 MG/DL (ref 0–1.2)
BUN SERPL-MCNC: 21 MG/DL (ref 8–23)
BUN/CREAT SERPL: 28.8 (ref 7–25)
CALCIUM SPEC-SCNC: 9.2 MG/DL (ref 8.6–10.5)
CHLORIDE SERPL-SCNC: 100 MMOL/L (ref 98–107)
CK SERPL-CCNC: 78 U/L (ref 20–200)
CO2 SERPL-SCNC: 25 MMOL/L (ref 22–29)
CREAT SERPL-MCNC: 0.73 MG/DL (ref 0.76–1.27)
CRP SERPL-MCNC: 1.98 MG/DL (ref 0–0.5)
DEPRECATED RDW RBC AUTO: 39 FL (ref 37–54)
EOSINOPHIL # BLD AUTO: 0 10*3/MM3 (ref 0–0.4)
EOSINOPHIL NFR BLD AUTO: 0 % (ref 0.3–6.2)
ERYTHROCYTE [DISTWIDTH] IN BLOOD BY AUTOMATED COUNT: 12.1 % (ref 12.3–15.4)
FERRITIN SERPL-MCNC: 1117 NG/ML (ref 30–400)
GFR SERPL CREATININE-BSD FRML MDRD: 105 ML/MIN/1.73
GLOBULIN UR ELPH-MCNC: 3.6 GM/DL
GLUCOSE BLDC GLUCOMTR-MCNC: 205 MG/DL (ref 70–130)
GLUCOSE BLDC GLUCOMTR-MCNC: 229 MG/DL (ref 70–130)
GLUCOSE BLDC GLUCOMTR-MCNC: 296 MG/DL (ref 70–130)
GLUCOSE BLDC GLUCOMTR-MCNC: 379 MG/DL (ref 70–130)
GLUCOSE SERPL-MCNC: 258 MG/DL (ref 65–99)
HCT VFR BLD AUTO: 41.7 % (ref 37.5–51)
HGB BLD-MCNC: 15 G/DL (ref 13–17.7)
IMM GRANULOCYTES # BLD AUTO: 0.31 10*3/MM3 (ref 0–0.05)
IMM GRANULOCYTES NFR BLD AUTO: 3.3 % (ref 0–0.5)
LYMPHOCYTES # BLD AUTO: 0.79 10*3/MM3 (ref 0.7–3.1)
LYMPHOCYTES NFR BLD AUTO: 8.4 % (ref 19.6–45.3)
MCH RBC QN AUTO: 31.8 PG (ref 26.6–33)
MCHC RBC AUTO-ENTMCNC: 36 G/DL (ref 31.5–35.7)
MCV RBC AUTO: 88.3 FL (ref 79–97)
MONOCYTES # BLD AUTO: 0.83 10*3/MM3 (ref 0.1–0.9)
MONOCYTES NFR BLD AUTO: 8.8 % (ref 5–12)
NEUTROPHILS NFR BLD AUTO: 7.4 10*3/MM3 (ref 1.7–7)
NEUTROPHILS NFR BLD AUTO: 78.9 % (ref 42.7–76)
NRBC BLD AUTO-RTO: 0 /100 WBC (ref 0–0.2)
PLATELET # BLD AUTO: 557 10*3/MM3 (ref 140–450)
PMV BLD AUTO: 9.1 FL (ref 6–12)
POTASSIUM SERPL-SCNC: 4.2 MMOL/L (ref 3.5–5.2)
PROT SERPL-MCNC: 6.6 G/DL (ref 6–8.5)
RBC # BLD AUTO: 4.72 10*6/MM3 (ref 4.14–5.8)
SODIUM SERPL-SCNC: 134 MMOL/L (ref 136–145)
WBC # BLD AUTO: 9.39 10*3/MM3 (ref 3.4–10.8)

## 2021-02-09 PROCEDURE — 94799 UNLISTED PULMONARY SVC/PX: CPT

## 2021-02-09 PROCEDURE — 71045 X-RAY EXAM CHEST 1 VIEW: CPT

## 2021-02-09 PROCEDURE — 63710000001 INSULIN ASPART PER 5 UNITS: Performed by: FAMILY MEDICINE

## 2021-02-09 PROCEDURE — 97110 THERAPEUTIC EXERCISES: CPT

## 2021-02-09 PROCEDURE — 25010000002 ENOXAPARIN PER 10 MG: Performed by: HOSPITALIST

## 2021-02-09 PROCEDURE — 97530 THERAPEUTIC ACTIVITIES: CPT

## 2021-02-09 PROCEDURE — 82550 ASSAY OF CK (CPK): CPT | Performed by: HOSPITALIST

## 2021-02-09 PROCEDURE — 82962 GLUCOSE BLOOD TEST: CPT

## 2021-02-09 PROCEDURE — 85025 COMPLETE CBC W/AUTO DIFF WBC: CPT | Performed by: HOSPITALIST

## 2021-02-09 PROCEDURE — 82248 BILIRUBIN DIRECT: CPT | Performed by: HOSPITALIST

## 2021-02-09 PROCEDURE — 80053 COMPREHEN METABOLIC PANEL: CPT | Performed by: HOSPITALIST

## 2021-02-09 PROCEDURE — 25010000002 DEXAMETHASONE PER 1 MG: Performed by: FAMILY MEDICINE

## 2021-02-09 PROCEDURE — 82728 ASSAY OF FERRITIN: CPT | Performed by: HOSPITALIST

## 2021-02-09 PROCEDURE — 63710000001 INSULIN DETEMIR PER 5 UNITS: Performed by: FAMILY MEDICINE

## 2021-02-09 PROCEDURE — 94760 N-INVAS EAR/PLS OXIMETRY 1: CPT

## 2021-02-09 PROCEDURE — 86140 C-REACTIVE PROTEIN: CPT | Performed by: HOSPITALIST

## 2021-02-09 RX ADMIN — DEXAMETHASONE SODIUM PHOSPHATE 6 MG: 4 INJECTION, SOLUTION INTRA-ARTICULAR; INTRALESIONAL; INTRAMUSCULAR; INTRAVENOUS; SOFT TISSUE at 08:30

## 2021-02-09 RX ADMIN — INSULIN DETEMIR 15 UNITS: 100 INJECTION, SOLUTION SUBCUTANEOUS at 22:00

## 2021-02-09 RX ADMIN — OXYCODONE HYDROCHLORIDE AND ACETAMINOPHEN 1000 MG: 500 TABLET ORAL at 08:31

## 2021-02-09 RX ADMIN — INSULIN ASPART 8 UNITS: 100 INJECTION, SOLUTION INTRAVENOUS; SUBCUTANEOUS at 08:29

## 2021-02-09 RX ADMIN — ALBUTEROL SULFATE 2 PUFF: 90 AEROSOL, METERED RESPIRATORY (INHALATION) at 20:15

## 2021-02-09 RX ADMIN — ENOXAPARIN SODIUM 40 MG: 40 INJECTION SUBCUTANEOUS at 21:59

## 2021-02-09 RX ADMIN — BUDESONIDE AND FORMOTEROL FUMARATE DIHYDRATE 2 PUFF: 160; 4.5 AEROSOL RESPIRATORY (INHALATION) at 20:15

## 2021-02-09 RX ADMIN — BARICITINIB 4 MG: 2 TABLET, FILM COATED ORAL at 08:30

## 2021-02-09 RX ADMIN — PANTOPRAZOLE SODIUM 40 MG: 40 TABLET, DELAYED RELEASE ORAL at 06:01

## 2021-02-09 RX ADMIN — THERA TABS 1 TABLET: TAB at 08:30

## 2021-02-09 RX ADMIN — ATORVASTATIN CALCIUM 40 MG: 40 TABLET, FILM COATED ORAL at 08:30

## 2021-02-09 RX ADMIN — LEVOTHYROXINE SODIUM 125 MCG: 125 TABLET ORAL at 06:01

## 2021-02-09 RX ADMIN — REMDESIVIR 100 MG: 100 INJECTION, POWDER, LYOPHILIZED, FOR SOLUTION INTRAVENOUS at 21:34

## 2021-02-09 RX ADMIN — IPRATROPIUM BROMIDE 2 PUFF: 17 AEROSOL, METERED RESPIRATORY (INHALATION) at 12:49

## 2021-02-09 RX ADMIN — BUDESONIDE AND FORMOTEROL FUMARATE DIHYDRATE 2 PUFF: 160; 4.5 AEROSOL RESPIRATORY (INHALATION) at 08:18

## 2021-02-09 RX ADMIN — ASPIRIN 81 MG: 81 TABLET, COATED ORAL at 08:32

## 2021-02-09 RX ADMIN — ALBUTEROL SULFATE 2 PUFF: 90 AEROSOL, METERED RESPIRATORY (INHALATION) at 16:22

## 2021-02-09 RX ADMIN — IPRATROPIUM BROMIDE 2 PUFF: 17 AEROSOL, METERED RESPIRATORY (INHALATION) at 16:23

## 2021-02-09 RX ADMIN — INSULIN ASPART 8 UNITS: 100 INJECTION, SOLUTION INTRAVENOUS; SUBCUTANEOUS at 11:11

## 2021-02-09 RX ADMIN — IPRATROPIUM BROMIDE 2 PUFF: 17 AEROSOL, METERED RESPIRATORY (INHALATION) at 20:15

## 2021-02-09 RX ADMIN — TAMSULOSIN HYDROCHLORIDE 0.4 MG: 0.4 CAPSULE ORAL at 08:32

## 2021-02-09 RX ADMIN — IPRATROPIUM BROMIDE 2 PUFF: 17 AEROSOL, METERED RESPIRATORY (INHALATION) at 08:18

## 2021-02-09 RX ADMIN — ALBUTEROL SULFATE 2 PUFF: 90 AEROSOL, METERED RESPIRATORY (INHALATION) at 12:48

## 2021-02-09 RX ADMIN — CETIRIZINE HYDROCHLORIDE 10 MG: 10 TABLET, FILM COATED ORAL at 09:18

## 2021-02-09 RX ADMIN — INSULIN ASPART 5 UNITS: 100 INJECTION, SOLUTION INTRAVENOUS; SUBCUTANEOUS at 17:04

## 2021-02-09 RX ADMIN — DEXAMETHASONE SODIUM PHOSPHATE 6 MG: 4 INJECTION, SOLUTION INTRA-ARTICULAR; INTRALESIONAL; INTRAMUSCULAR; INTRAVENOUS; SOFT TISSUE at 21:58

## 2021-02-09 RX ADMIN — ALBUTEROL SULFATE 2 PUFF: 90 AEROSOL, METERED RESPIRATORY (INHALATION) at 08:17

## 2021-02-09 NOTE — THERAPY TREATMENT NOTE
Acute Care - Physical Therapy Treatment Note  [facility]     Patient Name: Alfie Joseph  : 1945  MRN: [id number]  Today's Date: 2021           PT Assessment (last 12 hours)      PT Evaluation and Treatment     Row Name 21          Physical Therapy Time and Intention    Subjective Information  no complaints  -SHANNAN     Document Type  therapy note (daily note)  -SHANNAN     Mode of Treatment  individual therapy;physical therapy  -SHANNAN     Patient Effort  good  -SHANNAN     Comment  CNA present with pt. finishing bath while pt. up in recliner   -     Row Name 21          General Information    Patient Profile Reviewed  yes  -SHANNAN     Existing Precautions/Restrictions  fall;oxygen therapy device and L/min  -SHANNAN     Row Name 21          Cognition    Orientation Status (Cognition)  oriented x 4  -     Row Name 21          Pain Scale: Numbers Pre/Post-Treatment    Pretreatment Pain Rating  0/10 - no pain  -SHANNAN     Posttreatment Pain Rating  0/10 - no pain  -SHANNAN     Row Name 21          Transfers    Transfers  sit-stand transfer;stand-sit transfer  -SHANNAN     Comment (Transfers)  Pt. performed x3 sit-stands from recliner SBA with each  -SHANNAN     Sit-Stand [place] (Transfers)  standby assist  -     Stand-Sit [place] (Transfers)  standby assist  -     Row Name 21          Sit-Stand Transfer    Assistive Device (Sit-Stand Transfers)  other (see comments) no AD  -     Row Name 21          Stand-Sit Transfer    Assistive Device (Stand-Sit Transfers)  other (see comments) no AD   -     Row Name 21          Gait/Stairs (Locomotion)    [place] Level (Gait)  --  -SHANNAN     Assistive Device (Gait)  --  -     Row Name 21          Safety Issues, Functional Mobility    Impairments Affecting Function (Mobility)  endurance/activity tolerance;shortness of breath  -     Row Name 21          Hip (Therapeutic  Exercise)    Hip AROM (Therapeutic Exercise)  flexion 2x10  -JA     Hip Isometrics (Therapeutic Exercise)  aDduction x10  -     Row Name 02/09/21 0945          Knee (Therapeutic Exercise)    Knee AROM (Therapeutic Exercise)  bilateral;LAQ (long arc quad) 2x10  -     Row Name 02/09/21 0945          Ankle (Therapeutic Exercise)    Ankle AROM (Therapeutic Exercise)  bilateral;dorsiflexion;plantarflexion x20  -     Row Name 02/09/21 0945          Vital Signs    Pretreatment Heart Rate (beats/min)  68  -JA     Intratreatment Heart Rate (beats/min)  72  -JA     Posttreatment Heart Rate (beats/min)  68  -JA     Pre SpO2 (%)  93  -JA     O2 Delivery Pre Treatment  hi-flow  -JA     Intra SpO2 (%)  93  -JA     O2 Delivery Intra Treatment  hi-flow  -JA     Post SpO2 (%)  92  -JA     O2 Delivery Post Treatment  hi-flow  -JA     Pre Patient Position  Sitting  -     Recovery Time  Pt. required rest breaks between each therex due to O2 SATS range 87-90%   -[facility] Name 02/09/21 0945          Transfer Goal 1 (PT)    Activity/Assistive Device (Transfer Goal 1, PT)  sit-to-stand/stand-to-sit;bed-to-chair/chair-to-bed  -     [place] Level/Cues Needed (Transfer Goal 1, PT)  standby assist;independent O2 sats will not drop below 92%  -     Time Frame (Transfer Goal 1, PT)  by discharge  -     Progress/Outcome (Transfer Goal 1, PT)  goal not met  -[facility] Name 02/09/21 0945          Gait Training Goal 1 (PT)    Activity/Assistive Device (Gait Training Goal 1, PT)  gait (walking locomotion)  -     [place] Level (Gait Training Goal 1, PT)  contact guard assist;standby assist  -     Distance (Gait Training Goal 1, PT)  30ft or more x3 O2 sats will not drop below 92%  -     Time Frame (Gait Training Goal 1, PT)  by discharge  -     Progress/Outcome (Gait Training Goal 1, PT)  goal not met  -[facility] Name 02/09/21 0945          ROM Goal 1 (PT)    ROM Goal 1 (PT)  Pt will tolerate LE exercises OOB in  chair with VSS  -     Time Frame (ROM Goal 1, PT)  by discharge  -     Progress/Outcome (ROM Goal 1, PT)  goal partially met  -     Row Name 02/09/21 0945          Therapy Assessment/Plan (PT)    Rehab Potential (PT)  good, to achieve stated therapy goals  -     Criteria for Skilled Interventions Met (PT)  yes;skilled treatment is necessary;meets criteria  -SHANNAN     Row Name 02/09/21 0945          Progress Summary (PT)    Progress Toward Functional Goals (PT)  progress toward functional goals as expected  -       User Key  (r) = Recorded By, (t) = Taken By, (c) = Cosigned By    Initials Name Provider Type    Hussain Eckert, PTA Physical Therapy Assistant        Physical Therapy Education                 Title: PT OT SLP Therapies (In Progress)     Topic: Physical Therapy (In Progress)     Point: Mobility training (In Progress)     Learning Progress Summary           Patient Acceptance, E, NR by  at 2/8/2021 1653                   Point: Home exercise program (Not Started)     Learner Progress:  Not documented in this visit.          Point: Body mechanics (In Progress)     Learning Progress Summary           Patient Acceptance, E, NR by  at 2/8/2021 1653                   Point: Precautions (In Progress)     Learning Progress Summary           Patient Acceptance, E, NR by  at 2/8/2021 1653                               User Key     Initials Effective Dates Name Provider Type Discipline     04/03/18 -  Silvia Gonzalez, PT Physical Therapist PT              PT Recommendation and Plan  Anticipated Discharge Disposition (PT): home with assist, home with home health  Therapy Frequency (PT): other (see comments)(5-7 days /wk)  Progress Summary (PT)  Progress Toward Functional Goals (PT): progress toward functional goals as expected  Plan of Care Reviewed With: patient  Progress: improving  Outcome Summary: Pt. sitting up in recliner after CNA assisted pt. with bath this a.m. Pt. performed x10 reps  seated therex with O2 range 88%-90% during therex, pt. with x3 sit-stands with O2 90-92% with performance. Cont. to mobilize       Time Calculation:   PT Charges     Row Name 02/09/21 1023             Time Calculation    Start Time  0945  -SHANNAN      Stop Time  1023  -SHANNAN      Time Calculation (min)  38 min  -SHANNAN         Time Calculation- PT    Total Timed Code Minutes- PT  38 minute(s)  -SHANNAN        User Key  (r) = Recorded By, (t) = Taken By, (c) = Cosigned By    Initials Name Provider Type    Hussain Eckert PTA Physical Therapy Assistant        Therapy Charges for Today     Code Description Service Date Service Provider Modifiers Qty    31111179595 HC PT THERAPEUTIC ACT EA 15 MIN 2/9/2021 Hussain Victoria PTA GP 1    76037831052 HC PT THER PROC EA 15 MIN 2/9/2021 Hussain Victoria PTA GP 2          PT G-Codes  Outcome Measure Options: AM-PAC 6 Clicks Basic Mobility (PT)  AM-PAC 6 Clicks Score (PT): 20  AM-PAC 6 Clicks Score (OT): 20    Hussain Victoria PTA  2/9/2021

## 2021-02-09 NOTE — PLAN OF CARE
Goal Outcome Evaluation:  Plan of Care Reviewed With: patient  Progress: improving  Outcome Summary: Pt. sitting up in recliner after CNA assisted pt. with bath this a.m. Pt. performed x10 reps seated therex with O2 range 88%-90% during therex, pt. with x3 sit-stands with O2 90-92% with performance. Cont. to mobilize

## 2021-02-09 NOTE — PLAN OF CARE
Problem: Respiratory Compromise (Pneumonia)  Goal: Effective Oxygenation and Ventilation  Intervention: Promote Airway Secretion Clearance  Recent Flowsheet Documentation  Taken 2/8/2021 0942 by Rhonda Cason RN  Cough And Deep Breathing: done independently per patient   Goal Outcome Evaluation:  Plan of Care Reviewed With: patient  Progress: no change  Outcome Summary: resting between care, FS cont elevated, O2 maintained

## 2021-02-09 NOTE — THERAPY TREATMENT NOTE
Patient Name: Alfie Joseph  : 1945    MRN: [id number]                              Today's Date: 2021       Admit Date: 2/3/2021    Visit Dx:     ICD-10-CM ICD-9-CM   1. Pneumonia due to COVID-19 virus  U07.1 480.8    J12.82 079.89   2. Acute respiratory failure with hypoxia (CMS/[facility])  J96.01 518.81   3. Impaired mobility and activities of daily living  Z74.09 V49.89    Z78.9    4. Impaired functional mobility, balance, gait, and endurance  Z74.09 V49.89     Patient Active Problem List   Diagnosis   • Aortic valve disorder   • Coronary artery disease involving native coronary artery of native heart without angina pectoris   • Hyperlipidemia   • COVID-19   • Pneumonia due to COVID-19 virus   • Acute respiratory failure (CMS/[facility])   • Diabetes mellitus (CMS/[facility])     Past Medical History:   Diagnosis Date   • Acquired hypothyroidism    • Acute bronchitis    • Allergic rhinitis    • Aortic valve disorder     Aortic valve disorder - AVR   • Aortic valve sclerosis    • Benign prostatic hyperplasia    • Coronary arteriosclerosis    • Coronary atherosclerosis of native coronary artery     Coronary atherosclerosis of native coronary artery - CABG   • Diabetes (CMS/[facility])    • Exanthematous disorder    • GERD (gastroesophageal reflux disease)    • Hemorrhoids     Hemorrhoids - internal & external   • History of echocardiogram 10/26/2015    Echocardiogram W/ color flow 64503 (2) - Limited 2D echocardiogram. Normal prosthetic AV.   • History of echocardiogram 2013    Echocardiogram W/O color flow 14351 (3) - Mildly depressed LV systolic function with EF of 45-50%. Moderate CLVH. Grade I diastolic dysfunction of the left ventricular myocardium. No evidence of pericardial effusion.   • Hyperlipidemia    • Hypothyroidism    • Infection of skin and subcutaneous tissue     Infection of skin AND/OR subcutaneous tissue   • Tinnitus    • Type 2 diabetes mellitus (CMS/[facility])    • Urinary tract infectious disease      Past  Surgical History:   Procedure Laterality Date   • CARDIAC CATHETERIZATION  10/23/2013    Cardiac cath 39001 (1) - Multivessel coronary artery disease. Moderate AV stenosis. Mild aortic valve regurgitation.   • CORONARY ARTERY BYPASS GRAFT     • ENDOSCOPY  01/25/2010    Colon endoscopy 52565 (1) - internal external hemorrhoids. Otherwise normal   • GALLBLADDER SURGERY     • HYDROCELECTOMY      Remove hydrocele (1)   • INJECTION OF MEDICATION  05/21/2012    Kenalog (1) - ELLI PEPE (Gwennidia FELICIApenny)     General Information     Row Name 02/09/21 1209          OT Time and Intention    Document Type  therapy note (daily note)  -ME     Mode of Treatment  individual therapy;occupational therapy  -ME     Row Name 02/09/21 1209          General Information    Patient Profile Reviewed  yes  -ME     Existing Precautions/Restrictions  fall;oxygen therapy device and L/min  -ME     Row Name 02/09/21 1209          Cognition    Orientation Status (Cognition)  oriented x 4  -ME     Row Name 02/09/21 1209          Safety Issues, Functional Mobility    Safety Issues Affecting Function (Mobility)  safety precaution awareness;safety precautions follow-through/compliance;awareness of need for assistance  -ME     Impairments Affecting Function (Mobility)  endurance/activity tolerance;shortness of breath  -ME       User Key  (r) = Recorded By, (t) = Taken By, (c) = Cosigned By    Initials Name Provider Type    ME Shari Ryder OTR/L Occupational Therapist          Mobility/ADL's     Row Name 02/09/21 1209          Bed Mobility    Bed Mobility  supine-sit;sit-supine  -ME     Supine-Sit [place] (Bed Mobility)  modified independence  -ME     Sit-Supine [place] (Bed Mobility)  modified independence  -ME     Assistive Device (Bed Mobility)  head of bed elevated  -ME     Row Name 02/09/21 1209          Transfers    Comment (Transfers)  offered pt to sit up in chair, or go to the restroom, but pt states that he would like to stay in bed  at this time; was up in chair all morning  -ME       User Key  (r) = Recorded By, (t) = Taken By, (c) = Cosigned By    Initials Name Provider Type    ME Shari Ryder OTR/L Occupational Therapist        Obj/Interventions     Row Name 02/09/21 1209          Shoulder (Therapeutic Exercise)    Shoulder (Therapeutic Exercise)  strengthening exercise  -ME     Shoulder Strengthening (Therapeutic Exercise)  bilateral;flexion;extension;horizontal aBduction/aDduction;resisted diagonal exercise;scapular stabilization;sitting;resistance band;blue;10 repetitions;2 sets  -[facility] Name 02/09/21 1209          Elbow/Forearm (Therapeutic Exercise)    Elbow/Forearm (Therapeutic Exercise)  strengthening exercise  -ME     Elbow/Forearm Strengthening (Therapeutic Exercise)  bilateral;flexion;extension;sitting;resistance band;blue;10 repetitions;2 sets  -ME     Row Name 02/09/21 1209          Balance    Balance Assessment  sitting static balance;sitting dynamic balance  -ME     Static Sitting Balance  WFL  -ME     Dynamic Sitting Balance  WFL  -ME     Comment, Balance  sat EOB for approx 15 minutes for BUE ther ex  -ME     Row Name 02/09/21 1209          Therapeutic Exercise    Therapeutic Exercise  shoulder;elbow/forearm 4 different exercises; sitting EOB  -ME       User Key  (r) = Recorded By, (t) = Taken By, (c) = Cosigned By    Initials Name Provider Type    Shari Piedra OTR/L Occupational Therapist        Goals/Plan     Row Name 02/09/21 1209          Bed Mobility Goal 1 (OT)    Activity/Assistive Device (Bed Mobility Goal 1, OT)  sit to supine/supine to sit  -ME     [place] Level/Cues Needed (Bed Mobility Goal 1, OT)  independent  -ME     Time Frame (Bed Mobility Goal 1, OT)  long term goal (LTG);by discharge  -ME     Progress/Outcomes (Bed Mobility Goal 1, OT)  goal not met  -[facility] Name 02/09/21 1209          Transfer Goal 1 (OT)    Activity/Assistive Device (Transfer Goal 1, OT)   sit-to-stand/stand-to-sit;toilet  -ME     [place] Level/Cues Needed (Transfer Goal 1, OT)  standby assist;verbal cues required;tactile cues required  -ME     Time Frame (Transfer Goal 1, OT)  long term goal (LTG);by discharge  -ME     Progress/Outcome (Transfer Goal 1, OT)  goal not met  -ME     Row Name 02/09/21 1209          Bathing Goal 1 (OT)    Activity/Device (Bathing Goal 1, OT)  lower body bathing  -ME     [place] Level/Cues Needed (Bathing Goal 1, OT)  standby assist;verbal cues required;tactile cues required  -ME     Time Frame (Bathing Goal 1, OT)  long term goal (LTG);by discharge  -ME     Progress/Outcomes (Bathing Goal 1, OT)  goal not met  -ME     Row Name 02/09/21 1209          Dressing Goal 1 (OT)    Activity/Device (Dressing Goal 1, OT)  lower body dressing  -ME     [place]/Cues Needed (Dressing Goal 1, OT)  standby assist;verbal cues required;tactile cues required  -ME     Time Frame (Dressing Goal 1, OT)  long term goal (LTG);by discharge  -ME     Progress/Outcome (Dressing Goal 1, OT)  goal not met  -ME     Row Name 02/09/21 1209          Toileting Goal 1 (OT)    Activity/Device (Toileting Goal 1, OT)  toileting skills, all  -ME     [place] Level/Cues Needed (Toileting Goal 1, OT)  standby assist;verbal cues required;tactile cues required  -ME     Time Frame (Toileting Goal 1, OT)  long term goal (LTG);by discharge  -ME     Progress/Outcome (Toileting Goal 1, OT)  goal not met  -ME       User Key  (r) = Recorded By, (t) = Taken By, (c) = Cosigned By    Initials Name Provider Type    Shari Piedra OTR/L Occupational Therapist        Clinical Impression     Row Name 02/09/21 1209          Pain Assessment    Additional Documentation  Pain Scale: Numbers Pre/Post-Treatment (Group)  -ME     Row Name 02/09/21 1209          Pain Scale: Numbers Pre/Post-Treatment    Pretreatment Pain Rating  0/10 - no pain  -ME     Posttreatment Pain Rating  0/10 - no pain  -ME     Row Name  02/09/21 1209          Plan of Care Review    Plan of Care Reviewed With  patient  -ME     Row Name 02/09/21 1209          Therapy Assessment/Plan (OT)    Therapy Frequency (OT)  other (see comments) 5-7 days per week  -ME     Row Name 02/09/21 1209          Therapy Plan Review/Discharge Plan (OT)    Anticipated Discharge Disposition (OT)  home with home health  -ME     Row Name 02/09/21 1209          Vital Signs    Pretreatment Heart Rate (beats/min)  68  -ME     Posttreatment Heart Rate (beats/min)  74  -ME     Pre SpO2 (%)  96  -ME     O2 Delivery Pre Treatment  hi-flow airvo  -ME     Intra SpO2 (%)  93  -ME     O2 Delivery Intra Treatment  hi-flow  -ME     Post SpO2 (%)  94  -ME     O2 Delivery Post Treatment  hi-flow  -ME     Pre Patient Position  Supine fowlers in bed  -ME     Intra Patient Position  Sitting  -ME     Post Patient Position  Sitting  -ME     Row Name 02/09/21 1209          Positioning and Restraints    Pre-Treatment Position  in bed  -ME     Post Treatment Position  bed  -ME     In Bed  notified nsg;fowlers;call light within reach;encouraged to call for assist  -ME       User Key  (r) = Recorded By, (t) = Taken By, (c) = Cosigned By    Initials Name Provider Type    ME Shari Ryder OTR/L Occupational Therapist        Outcome Measures     Row Name 02/09/21 1209          How much help from another is currently needed...    Putting on and taking off regular lower body clothing?  3  -ME     Bathing (including washing, rinsing, and drying)  3  -ME     Toileting (which includes using toilet bed pan or urinal)  3  -ME     Putting on and taking off regular upper body clothing  3  -ME     Taking care of personal grooming (such as brushing teeth)  4  -ME     Eating meals  4  -ME     AM-PAC 6 Clicks Score (OT)  20  -ME     Row Name 02/09/21 1209          Functional Assessment    Outcome Measure Options  AM-PAC 6 Clicks Daily Activity (OT)  -ME       User Key  (r) = Recorded By, (t) = Taken By, (c) =  Cosigned By    Initials Name Provider Type    ME Shari Ryder OTR/L Occupational Therapist        Occupational Therapy Education                 Title: PT OT SLP Therapies (In Progress)     Topic: Occupational Therapy (In Progress)     Point: ADL training (Not Started)     Description:   Instruct learner(s) on proper safety adaptation and remediation techniques during self care or transfers.   Instruct in proper use of assistive devices.              Learner Progress:  Not documented in this visit.          Point: Home exercise program (Not Started)     Description:   Instruct learner(s) on appropriate technique for monitoring, assisting and/or progressing therapeutic exercises/activities.              Learner Progress:  Not documented in this visit.          Point: Precautions (Done)     Description:   Instruct learner(s) on prescribed precautions during self-care and functional transfers.              Learning Progress Summary           Patient Acceptance, E, VU by ME at 2/9/2021 1433    Comment: Educated on OT and POC. Educated to call for assistance. Educated on safety precautions. Educated on proper body mechanics for BUE ther ex.    Acceptance, E, VU by  at 2/8/2021 1527    Comment: Pt educated on role of OT, d/c recs, and POC                   Point: Body mechanics (Done)     Description:   Instruct learner(s) on proper positioning and spine alignment during self-care, functional mobility activities and/or exercises.              Learning Progress Summary           Patient Acceptance, E, VU by ME at 2/9/2021 1433    Comment: Educated on OT and POC. Educated to call for assistance. Educated on safety precautions. Educated on proper body mechanics for BUE ther ex.                               User Key     Initials Effective Dates Name Provider Type [facility] 08/24/20 -  Shari Ryder OTR/L Occupational Therapist OT     09/10/19 -  Des Marquez OT Occupational Therapist OT              OT  Recommendation and Plan  Therapy Frequency (OT): other (see comments)(5-7 days per week)  Plan of Care Review  Plan of Care Reviewed With: patient  Outcome Summary: OT treatment complete. pt was pleasant and cooperative throughout. completed sup to sit and sit to sup bed mobility with mod I, HOB elevated. completed 2 sets of 10 reps BUE ther ex with blue theraband. O2 remaining around 94% throughout. 4 different exercises completed. rest breaks taken as needed. offered to assist pt with toileting and sitting up in chair, but pt states that he sat up in chair all morning and would like to return to bed. no goals met this date. continue per OT POC.     Time Calculation:   Time Calculation- OT     Row Name 02/09/21 1434             Time Calculation- OT    OT Start Time  1209  -ME      OT Stop Time  1247  -ME      OT Time Calculation (min)  38 min  -ME      OT Received On  02/09/21  -ME        User Key  (r) = Recorded By, (t) = Taken By, (c) = Cosigned By    Initials Name Provider Type    ME Shari Ryder OTR/L Occupational Therapist        Therapy Charges for Today     Code Description Service Date Service Provider Modifiers Qty    32321138663 HC OT THER PROC EA 15 MIN 2/9/2021 Shari Ryder OTR/L GO 3               SHAYY Low/FAVIOLA  2/9/2021

## 2021-02-09 NOTE — PROGRESS NOTES
Progress Note  Oral Anguiano MD  Hospitalist    Date of visit: 2/9/2021     LOS: 6 days   Patient Care Team:  Clifford Weston MD as PCP - General    Chief Complaint: dyspnea    Subjective     Interval History:     Patient Complaints: dyspnea / improved to some extent. Continues to require high flow supplemental Oxygen at 60 L/min    History taken from: patient    Medication Review:   Current Facility-Administered Medications   Medication Dose Route Frequency Provider Last Rate Last Admin   • acetaminophen (TYLENOL) tablet 500 mg  500 mg Oral Q8H PRN Prashant Luna MD       • albuterol sulfate HFA (PROVENTIL HFA;VENTOLIN HFA;PROAIR HFA) inhaler 2 puff  2 puff Inhalation 4x Daily - RT Alfie Willis MD   2 puff at 02/09/21 1248   • ascorbic acid (VITAMIN C) tablet 1,000 mg  1,000 mg Oral Daily Prashant Luna MD   1,000 mg at 02/09/21 0831   • aspirin EC tablet 81 mg  81 mg Oral Daily Prashant Luna MD   81 mg at 02/09/21 0832   • atorvastatin (LIPITOR) tablet 40 mg  40 mg Oral Daily Prashant Luna MD   40 mg at 02/09/21 0830   • baricitinib (OLUMIANT) tablet 4 mg  4 mg Oral Daily Rodrigo Finney MD   4 mg at 02/09/21 0830   • budesonide-formoterol (SYMBICORT) 160-4.5 MCG/ACT inhaler 2 puff  2 puff Inhalation BID - RT Alfie Willis MD   2 puff at 02/09/21 0818   • cetirizine (zyrTEC) tablet 10 mg  10 mg Oral Daily Prashant Luna MD   10 mg at 02/09/21 0918   • dexamethasone (DECADRON) injection 6 mg  6 mg Intravenous BID Alfie Willis MD   6 mg at 02/09/21 0830   • dextrose (D50W) 25 g/ 50mL Intravenous Solution 25 g  25 g Intravenous Q15 Min PRN Prashant Luna MD       • dextrose (GLUTOSE) oral gel 15 g  15 g Oral Q15 Min PRN Prashant Luna MD       • enoxaparin (LOVENOX) syringe 40 mg  40 mg Subcutaneous Nightly Prashant Luna MD   40 mg at 02/08/21 2105   • glucagon (human recombinant) (GLUCAGEN DIAGNOSTIC) injection 1 mg  1 mg Subcutaneous Q15 Min Prashant Mojica MD        • insulin aspart (novoLOG) injection 0-14 Units  0-14 Units Subcutaneous TID AC Alfie Willis MD   8 Units at 02/09/21 1111   • insulin detemir (LEVEMIR) injection 15 Units  15 Units Subcutaneous Nightly Alfie Willis MD   15 Units at 02/08/21 2106   • ipratropium (ATROVENT HFA) inhaler 2 puff  2 puff Inhalation 4x Daily - RT Alfie Willis MD   2 puff at 02/09/21 1249   • levothyroxine (SYNTHROID, LEVOTHROID) tablet 125 mcg  125 mcg Oral Q AM Prashant Luna MD   125 mcg at 02/09/21 0601   • multivitamin (THERAGRAN) tablet 1 tablet  1 tablet Oral Daily Prashant Luna MD   1 tablet at 02/09/21 0830   • pantoprazole (PROTONIX) EC tablet 40 mg  40 mg Oral QAM Prashant Luna MD   40 mg at 02/09/21 0601   • Pharmacy Consult - Pharmacy to dose   Does not apply Continuous PRN Rodrigo Finney MD       • Pharmacy Consult - Remdesivir   Does not apply Continuous PRN Prashant Luna MD       • remdesivir 100 mg in sodium chloride 0.9 % 250 mL IVPB (powder vial)  100 mg Intravenous Q24H Rodrigo Finney MD   100 mg at 02/08/21 2105   • sodium chloride 0.9 % flush 10 mL  10 mL Intravenous PRN Prashant Luna MD   10 mL at 02/08/21 2105   • sodium chloride 0.9 % infusion  50 mL/hr Intravenous Continuous Prashant Luna MD 50 mL/hr at 02/05/21 2218 50 mL/hr at 02/05/21 2218   • tamsulosin (FLOMAX) 24 hr capsule 0.4 mg  0.4 mg Oral Daily Prashant Luna MD   0.4 mg at 02/09/21 0832       Review of Systems:   Review of Systems   Constitutional: Positive for fatigue. Negative for fever.   Respiratory: Positive for cough and shortness of breath. Negative for wheezing.    Cardiovascular: Negative for chest pain, palpitations and leg swelling.   Gastrointestinal: Negative for abdominal distention, abdominal pain, blood in stool, diarrhea, nausea and vomiting.   Genitourinary: Negative for dysuria, frequency, genital sores, hematuria and urgency.   Musculoskeletal: Negative for arthralgias, back pain and  gait problem.   Skin: Positive for pallor. Negative for color change and rash.   Neurological: Positive for weakness. Negative for seizures, speech difficulty and headaches.   Psychiatric/Behavioral: Negative for agitation, behavioral problems and confusion.   All other systems reviewed and are negative.      Objective     Vital Signs  Temp:  [96.6 °F (35.9 °C)-97.8 °F (36.6 °C)] 97.5 °F (36.4 °C)  Heart Rate:  [58-76] 76  Resp:  [16-22] 22  BP: (102-135)/(58-90) 115/62    Physical Exam:  Physical Exam  Vitals signs reviewed.   Constitutional:       General: He is not in acute distress.     Appearance: He is ill-appearing.   HENT:      Head: Normocephalic and atraumatic.   Eyes:      General: No scleral icterus.     Extraocular Movements: Extraocular movements intact.      Pupils: Pupils are equal, round, and reactive to light.   Neck:      Musculoskeletal: Normal range of motion and neck supple. No neck rigidity or muscular tenderness.   Cardiovascular:      Rate and Rhythm: Normal rate and regular rhythm.   Pulmonary:      Effort: No respiratory distress.      Breath sounds: No stridor. Rales present. No rhonchi.   Abdominal:      General: There is no distension.      Palpations: There is no mass.      Tenderness: There is no abdominal tenderness.   Musculoskeletal: Normal range of motion.         General: No swelling, tenderness or deformity.   Skin:     General: Skin is warm and dry.      Coloration: Skin is pale. Skin is not jaundiced.      Findings: No bruising.   Neurological:      General: No focal deficit present.      Mental Status: He is alert and oriented to person, place, and time.      Cranial Nerves: No cranial nerve deficit.      Sensory: No sensory deficit.   Psychiatric:         Mood and Affect: Mood normal.         Behavior: Behavior normal.          Results Review:    Lab Results (last 24 hours)     Procedure Component Value Units Date/Time    POC Glucose Once [[id number]]  (Abnormal) Collected:  02/09/21 1033    Specimen: Blood Updated: 02/09/21 1113     Glucose 296 mg/dL      Comment: RN NotifiedOperator: [id number] MARGARITO Fortune ID: [id number]       Ferritin [[id number]]  (Abnormal) Collected: 02/09/21 0713    Specimen: Blood Updated: 02/09/21 0806     Ferritin 1,117.00 ng/mL     Narrative:      Results may be falsely decreased if patient taking Biotin.      Comprehensive Metabolic Panel [[id number]]  (Abnormal) Collected: 02/09/21 0713    Specimen: Blood Updated: 02/09/21 0802     Glucose 258 mg/dL      BUN 21 mg/dL      Creatinine 0.73 mg/dL      Sodium 134 mmol/L      Potassium 4.2 mmol/L      Chloride 100 mmol/L      CO2 25.0 mmol/L      Calcium 9.2 mg/dL      Total Protein 6.6 g/dL      Albumin 3.00 g/dL      ALT (SGPT) 31 U/L      AST (SGOT) 22 U/L      Alkaline Phosphatase 69 U/L      Total Bilirubin 0.5 mg/dL      eGFR Non African Amer 105 mL/min/1.73      Globulin 3.6 gm/dL      A/G Ratio 0.8 g/dL      BUN/Creatinine Ratio 28.8     Anion Gap 9.0 mmol/L     Narrative:      GFR Normal >60  Chronic Kidney Disease <60  Kidney Failure <15      CK [[id number]]  (Normal) Collected: 02/09/21 0713    Specimen: Blood Updated: 02/09/21 0802     Creatine Kinase 78 U/L     C-reactive Protein [[id number]]  (Abnormal) Collected: 02/09/21 0713    Specimen: Blood Updated: 02/09/21 0802     C-Reactive Protein 1.98 mg/dL     Bilirubin, Direct [[id number]]  (Normal) Collected: 02/09/21 0713    Specimen: Blood Updated: 02/09/21 0802     Bilirubin, Direct 0.2 mg/dL     CBC & Differential [[id number]]  (Abnormal) Collected: 02/09/21 0713    Specimen: Blood Updated: 02/09/21 0741    Narrative:      The following orders were created for panel order CBC & Differential.  Procedure                               Abnormality         Status                     ---------                               -----------         ------                     CBC Auto Differential[[id number]]        Abnormal            Final result                  Please view results for these tests on the individual orders.    CBC Auto Differential [[id number]]  (Abnormal) Collected: 02/09/21 0713    Specimen: Blood Updated: 02/09/21 0741     WBC 9.39 10*3/mm3      RBC 4.72 10*6/mm3      Hemoglobin 15.0 g/dL      Hematocrit 41.7 %      MCV 88.3 fL      MCH 31.8 pg      MCHC 36.0 g/dL      RDW 12.1 %      RDW-SD 39.0 fl      MPV 9.1 fL      Platelets 557 10*3/mm3      Neutrophil % 78.9 %      Lymphocyte % 8.4 %      Monocyte % 8.8 %      Eosinophil % 0.0 %      Basophil % 0.6 %      Immature Grans % 3.3 %      Neutrophils, Absolute 7.40 10*3/mm3      Lymphocytes, Absolute 0.79 10*3/mm3      Monocytes, Absolute 0.83 10*3/mm3      Eosinophils, Absolute 0.00 10*3/mm3      Basophils, Absolute 0.06 10*3/mm3      Immature Grans, Absolute 0.31 10*3/mm3      nRBC 0.0 /100 WBC     POC Glucose Once [[id number]]  (Abnormal) Collected: 02/09/21 0706    Specimen: Blood Updated: 02/09/21 0727     Glucose 229 mg/dL      Comment: RN NotifiedOperator: [id number] MARGARITO PERLAMeter ID: [id number]       POC Glucose Once [[id number]]  (Abnormal) Collected: 02/08/21 1958    Specimen: Blood Updated: 02/08/21 2016     Glucose 293 mg/dL      Comment: RN NotifiedOperator: [id number] BOB CORREAAMeter ID: [id number]       POC Glucose Once [[id number]]  (Abnormal) Collected: 02/08/21 1656    Specimen: Blood Updated: 02/08/21 1711     Glucose 308 mg/dL      Comment: RN NotifiedOperator: [id number] ANTONIA Soriano ID: [id number]       Blood Culture - Blood, Arm, Right [[id number]] Collected: 02/03/21 1431    Specimen: Blood from Arm, Right Updated: 02/08/21 1446     Blood Culture No growth at 5 days    Blood Culture - Blood, Arm, Left [[id number]] Collected: 02/03/21 1345    Specimen: Blood from Arm, Left Updated: 02/08/21 1446     Blood Culture No growth at 5 days          Imaging Results (Last 24 Hours)     ** No results found for the last 24 hours. **          Assessment/Plan       Acute  respiratory failure (CMS/HCC)    Pneumonia due to COVID-19 virus    Diabetes mellitus (CMS/HCC)    Continue with supplemental Oxygen, IV Decadron / Remdesivir, Olumiant, supportive care.    Oral Anguiano MD  02/09/21  13:08 CST

## 2021-02-09 NOTE — PLAN OF CARE
Goal Outcome Evaluation:  Plan of Care Reviewed With: patient     Outcome Summary: OT treatment complete. pt was pleasant and cooperative throughout. completed sup to sit and sit to sup bed mobility with mod I, HOB elevated. completed 2 sets of 10 reps BUE ther ex with blue theraband. O2 remaining around 94% throughout. 4 different exercises completed. rest breaks taken as needed. offered to assist pt with toileting and sitting up in chair, but pt states that he sat up in chair all morning and would like to return to bed. no goals met this date. continue per OT POC.

## 2021-02-09 NOTE — PROGRESS NOTES
Adult Nutrition  Assessment    Patient Name:  Alfie Joseph  YOB: 1945  MRN: [id number]  Admit Date:  2/3/2021    Assessment Date:  2/9/2021    Comments:   Pt t/f out of CCU- currently requiring AirVo- remedesivir, baricitinib and decadron in place. Gluc exacerbated by steroids- 296 this am, SSI and levemir 15u hs in place. Alb 3.0L. MVI daily.  ADA cardiac diet, intakes average 78% meals over last 4 days.  RD will follow hospital course.     Electronically signed by:  Lula Osborn RD  02/09/21 13:12 CST

## 2021-02-09 NOTE — PLAN OF CARE
Goal Outcome Evaluation:  Outcome Summary: Patient denies pain at this time, pt remains on Airvo, pt has no complaints at this time.

## 2021-02-09 NOTE — PLAN OF CARE
Goal Outcome Evaluation:  Plan of Care Reviewed With: patient  Patient short of breath w activity.

## 2021-02-10 LAB
ALBUMIN SERPL-MCNC: 3.1 G/DL (ref 3.5–5.2)
ALBUMIN/GLOB SERPL: 0.9 G/DL
ALP SERPL-CCNC: 66 U/L (ref 39–117)
ALT SERPL W P-5'-P-CCNC: 35 U/L (ref 1–41)
ANION GAP SERPL CALCULATED.3IONS-SCNC: 8 MMOL/L (ref 5–15)
AST SERPL-CCNC: 23 U/L (ref 1–40)
BASOPHILS # BLD AUTO: 0.03 10*3/MM3 (ref 0–0.2)
BASOPHILS NFR BLD AUTO: 0.3 % (ref 0–1.5)
BILIRUB CONJ SERPL-MCNC: 0.2 MG/DL (ref 0–0.3)
BILIRUB SERPL-MCNC: 0.6 MG/DL (ref 0–1.2)
BUN SERPL-MCNC: 24 MG/DL (ref 8–23)
BUN/CREAT SERPL: 29.3 (ref 7–25)
CALCIUM SPEC-SCNC: 9.2 MG/DL (ref 8.6–10.5)
CHLORIDE SERPL-SCNC: 96 MMOL/L (ref 98–107)
CK SERPL-CCNC: 47 U/L (ref 20–200)
CO2 SERPL-SCNC: 29 MMOL/L (ref 22–29)
CREAT SERPL-MCNC: 0.82 MG/DL (ref 0.76–1.27)
CRP SERPL-MCNC: 1.1 MG/DL (ref 0–0.5)
D-DIMER, QUANTITATIVE (MAD,POW, STR): 973 NG/ML (FEU) (ref 0–470)
DEPRECATED RDW RBC AUTO: 40.1 FL (ref 37–54)
EOSINOPHIL # BLD AUTO: 0 10*3/MM3 (ref 0–0.4)
EOSINOPHIL NFR BLD AUTO: 0 % (ref 0.3–6.2)
ERYTHROCYTE [DISTWIDTH] IN BLOOD BY AUTOMATED COUNT: 12.3 % (ref 12.3–15.4)
FERRITIN SERPL-MCNC: 1011 NG/ML (ref 30–400)
FIBRINOGEN PPP-MCNC: 422 MG/DL (ref 228–514)
GFR SERPL CREATININE-BSD FRML MDRD: 92 ML/MIN/1.73
GLOBULIN UR ELPH-MCNC: 3.3 GM/DL
GLUCOSE BLDC GLUCOMTR-MCNC: 281 MG/DL (ref 70–130)
GLUCOSE BLDC GLUCOMTR-MCNC: 281 MG/DL (ref 70–130)
GLUCOSE BLDC GLUCOMTR-MCNC: 283 MG/DL (ref 70–130)
GLUCOSE BLDC GLUCOMTR-MCNC: 361 MG/DL (ref 70–130)
GLUCOSE SERPL-MCNC: 313 MG/DL (ref 65–99)
HCT VFR BLD AUTO: 41.8 % (ref 37.5–51)
HGB BLD-MCNC: 14.8 G/DL (ref 13–17.7)
IMM GRANULOCYTES # BLD AUTO: 0.32 10*3/MM3 (ref 0–0.05)
IMM GRANULOCYTES NFR BLD AUTO: 3.1 % (ref 0–0.5)
LDH SERPL-CCNC: 258 U/L (ref 135–225)
LYMPHOCYTES # BLD AUTO: 0.77 10*3/MM3 (ref 0.7–3.1)
LYMPHOCYTES NFR BLD AUTO: 7.4 % (ref 19.6–45.3)
MCH RBC QN AUTO: 31.7 PG (ref 26.6–33)
MCHC RBC AUTO-ENTMCNC: 35.4 G/DL (ref 31.5–35.7)
MCV RBC AUTO: 89.5 FL (ref 79–97)
MONOCYTES # BLD AUTO: 0.92 10*3/MM3 (ref 0.1–0.9)
MONOCYTES NFR BLD AUTO: 8.9 % (ref 5–12)
NEUTROPHILS NFR BLD AUTO: 8.31 10*3/MM3 (ref 1.7–7)
NEUTROPHILS NFR BLD AUTO: 80.3 % (ref 42.7–76)
NRBC BLD AUTO-RTO: 0 /100 WBC (ref 0–0.2)
PLATELET # BLD AUTO: 553 10*3/MM3 (ref 140–450)
PMV BLD AUTO: 9.2 FL (ref 6–12)
POTASSIUM SERPL-SCNC: 4.5 MMOL/L (ref 3.5–5.2)
PROT SERPL-MCNC: 6.4 G/DL (ref 6–8.5)
RBC # BLD AUTO: 4.67 10*6/MM3 (ref 4.14–5.8)
SODIUM SERPL-SCNC: 133 MMOL/L (ref 136–145)
WBC # BLD AUTO: 10.35 10*3/MM3 (ref 3.4–10.8)

## 2021-02-10 PROCEDURE — 63710000001 INSULIN DETEMIR PER 5 UNITS: Performed by: INTERNAL MEDICINE

## 2021-02-10 PROCEDURE — 25010000002 DEXAMETHASONE PER 1 MG: Performed by: FAMILY MEDICINE

## 2021-02-10 PROCEDURE — 25010000002 ENOXAPARIN PER 10 MG: Performed by: HOSPITALIST

## 2021-02-10 PROCEDURE — 94799 UNLISTED PULMONARY SVC/PX: CPT

## 2021-02-10 PROCEDURE — 82248 BILIRUBIN DIRECT: CPT | Performed by: HOSPITALIST

## 2021-02-10 PROCEDURE — 85025 COMPLETE CBC W/AUTO DIFF WBC: CPT | Performed by: HOSPITALIST

## 2021-02-10 PROCEDURE — 85379 FIBRIN DEGRADATION QUANT: CPT | Performed by: HOSPITALIST

## 2021-02-10 PROCEDURE — 63710000001 INSULIN ASPART PER 5 UNITS: Performed by: FAMILY MEDICINE

## 2021-02-10 PROCEDURE — 82728 ASSAY OF FERRITIN: CPT | Performed by: HOSPITALIST

## 2021-02-10 PROCEDURE — 97110 THERAPEUTIC EXERCISES: CPT

## 2021-02-10 PROCEDURE — 82550 ASSAY OF CK (CPK): CPT | Performed by: HOSPITALIST

## 2021-02-10 PROCEDURE — 85384 FIBRINOGEN ACTIVITY: CPT | Performed by: HOSPITALIST

## 2021-02-10 PROCEDURE — 94760 N-INVAS EAR/PLS OXIMETRY 1: CPT

## 2021-02-10 PROCEDURE — 97116 GAIT TRAINING THERAPY: CPT

## 2021-02-10 PROCEDURE — 82962 GLUCOSE BLOOD TEST: CPT

## 2021-02-10 PROCEDURE — 97530 THERAPEUTIC ACTIVITIES: CPT

## 2021-02-10 PROCEDURE — 86140 C-REACTIVE PROTEIN: CPT | Performed by: HOSPITALIST

## 2021-02-10 PROCEDURE — 80053 COMPREHEN METABOLIC PANEL: CPT | Performed by: HOSPITALIST

## 2021-02-10 PROCEDURE — 83615 LACTATE (LD) (LDH) ENZYME: CPT | Performed by: HOSPITALIST

## 2021-02-10 RX ADMIN — INSULIN DETEMIR 20 UNITS: 100 INJECTION, SOLUTION SUBCUTANEOUS at 20:55

## 2021-02-10 RX ADMIN — SODIUM CHLORIDE, PRESERVATIVE FREE 10 ML: 5 INJECTION INTRAVENOUS at 08:53

## 2021-02-10 RX ADMIN — ENOXAPARIN SODIUM 40 MG: 40 INJECTION SUBCUTANEOUS at 20:55

## 2021-02-10 RX ADMIN — SODIUM CHLORIDE, PRESERVATIVE FREE 10 ML: 5 INJECTION INTRAVENOUS at 20:55

## 2021-02-10 RX ADMIN — PANTOPRAZOLE SODIUM 40 MG: 40 TABLET, DELAYED RELEASE ORAL at 06:16

## 2021-02-10 RX ADMIN — TAMSULOSIN HYDROCHLORIDE 0.4 MG: 0.4 CAPSULE ORAL at 08:54

## 2021-02-10 RX ADMIN — IPRATROPIUM BROMIDE 2 PUFF: 17 AEROSOL, METERED RESPIRATORY (INHALATION) at 15:18

## 2021-02-10 RX ADMIN — ALBUTEROL SULFATE 2 PUFF: 90 AEROSOL, METERED RESPIRATORY (INHALATION) at 08:12

## 2021-02-10 RX ADMIN — ATORVASTATIN CALCIUM 40 MG: 40 TABLET, FILM COATED ORAL at 08:54

## 2021-02-10 RX ADMIN — THERA TABS 1 TABLET: TAB at 08:54

## 2021-02-10 RX ADMIN — CETIRIZINE HYDROCHLORIDE 10 MG: 10 TABLET, FILM COATED ORAL at 08:54

## 2021-02-10 RX ADMIN — INSULIN ASPART 8 UNITS: 100 INJECTION, SOLUTION INTRAVENOUS; SUBCUTANEOUS at 18:09

## 2021-02-10 RX ADMIN — IPRATROPIUM BROMIDE 2 PUFF: 17 AEROSOL, METERED RESPIRATORY (INHALATION) at 08:12

## 2021-02-10 RX ADMIN — ASPIRIN 81 MG: 81 TABLET, COATED ORAL at 08:55

## 2021-02-10 RX ADMIN — OXYCODONE HYDROCHLORIDE AND ACETAMINOPHEN 1000 MG: 500 TABLET ORAL at 08:54

## 2021-02-10 RX ADMIN — INSULIN ASPART 8 UNITS: 100 INJECTION, SOLUTION INTRAVENOUS; SUBCUTANEOUS at 08:54

## 2021-02-10 RX ADMIN — NYSTATIN 500000 UNITS: 500000 SUSPENSION ORAL at 21:36

## 2021-02-10 RX ADMIN — LEVOTHYROXINE SODIUM 125 MCG: 125 TABLET ORAL at 06:24

## 2021-02-10 RX ADMIN — INSULIN ASPART 8 UNITS: 100 INJECTION, SOLUTION INTRAVENOUS; SUBCUTANEOUS at 11:16

## 2021-02-10 RX ADMIN — BARICITINIB 4 MG: 2 TABLET, FILM COATED ORAL at 08:55

## 2021-02-10 RX ADMIN — IPRATROPIUM BROMIDE 2 PUFF: 17 AEROSOL, METERED RESPIRATORY (INHALATION) at 19:26

## 2021-02-10 RX ADMIN — ALBUTEROL SULFATE 2 PUFF: 90 AEROSOL, METERED RESPIRATORY (INHALATION) at 15:17

## 2021-02-10 RX ADMIN — BUDESONIDE AND FORMOTEROL FUMARATE DIHYDRATE 2 PUFF: 160; 4.5 AEROSOL RESPIRATORY (INHALATION) at 19:25

## 2021-02-10 RX ADMIN — NYSTATIN 500000 UNITS: 500000 SUSPENSION ORAL at 18:09

## 2021-02-10 RX ADMIN — BUDESONIDE AND FORMOTEROL FUMARATE DIHYDRATE 2 PUFF: 160; 4.5 AEROSOL RESPIRATORY (INHALATION) at 08:12

## 2021-02-10 RX ADMIN — ALBUTEROL SULFATE 2 PUFF: 90 AEROSOL, METERED RESPIRATORY (INHALATION) at 19:26

## 2021-02-10 RX ADMIN — DEXAMETHASONE SODIUM PHOSPHATE 6 MG: 4 INJECTION, SOLUTION INTRA-ARTICULAR; INTRALESIONAL; INTRAMUSCULAR; INTRAVENOUS; SOFT TISSUE at 08:53

## 2021-02-10 RX ADMIN — DEXAMETHASONE SODIUM PHOSPHATE 6 MG: 4 INJECTION, SOLUTION INTRA-ARTICULAR; INTRALESIONAL; INTRAMUSCULAR; INTRAVENOUS; SOFT TISSUE at 20:55

## 2021-02-10 RX ADMIN — REMDESIVIR 100 MG: 100 INJECTION, POWDER, LYOPHILIZED, FOR SOLUTION INTRAVENOUS at 20:55

## 2021-02-10 NOTE — PLAN OF CARE
Problem: Adult Inpatient Plan of Care  Goal: Plan of Care Review  Flowsheets  Taken 2/10/2021 1150 by Liz Marquez COTA/L  Progress: improving  Outcome Summary: Pt tolerated tx well this date. Pt gave good effort with UE ther ex. Pt was SBA with sit-stand t/f and chair-bed t/f. Continue OT POC.  Taken 2/10/2021 1008 by Hussain Victoria PTA  Plan of Care Reviewed With: patient  Taken 2/10/2021 0907 by Liz Marquez COTA/L  Progress: improving  Plan of Care Reviewed With: patient   Goal Outcome Evaluation:  Plan of Care Reviewed With: patient  Progress: improving  Outcome Summary: Pt tolerated tx well this date. Pt gave good effort with UE ther ex. Pt was SBA with sit-stand t/f and chair-bed t/f. Continue OT POC.

## 2021-02-10 NOTE — PLAN OF CARE
Goal Outcome Evaluation:  Plan of Care Reviewed With: patient     Outcome Summary: Tolerating Airvo; no complaints at this time; resting quietly between rounds

## 2021-02-10 NOTE — DISCHARGE PLACEMENT REQUEST
"Alfie Joseph (76 y.o. Male)     Date of Birth Social Security Number Address Home Phone MRN    1945  [id number] [facility] [address] [phone] [id number]    [employer] Marital Status          Muslim        Admission Date Admission Type Admitting Provider Attending Provider Department, Room/Bed    2/3/21 Emergency Prashant Luna MD Williams, Kevin L, MD [address], 425/1    Discharge Date Discharge Disposition Discharge Destination                       Attending Provider: Prashant Luna MD    Allergies: No Known Allergies    Isolation: Enh Drop/Con   Infection: COVID (confirmed) (02/04/21)   Code Status: CPR    Ht: 185.4 cm (72.99\")   Wt: 85.9 kg (189 lb 4.8 oz)    Admission Cmt: None   Principal Problem: Acute respiratory failure (CMS/[facility]) [J96.00]                 Active Insurance as of 2/3/2021     Primary Coverage     Payor Plan Insurance Group Employer/Plan Group    MEDICARE MEDICARE A & B      Payor Plan Address Payor Plan Phone Number Payor Plan Fax Number Effective Dates    [address] [phone]  2/1/2012 - None Entered    [place] [address]       Subscriber Name Subscriber Birth Date Member ID       ALFIE JOSEPH 1945 [id number]                 Emergency Contacts      (Rel.) Home Phone Work Phone Mobile Phone    Juanita Joseph (Spouse) [phone] -- --            Emergency Contact Information     Name Relation Home Work Mobile    Juanita Joseph Spouse [phone]            Insurance Information                MEDICARE/MEDICARE A & B Phone: [phone]    Subscriber: Alfie Joseph Subscriber#: [id number]    Group#:  Precert#:           "

## 2021-02-10 NOTE — PLAN OF CARE
Goal Outcome Evaluation:  Plan of Care Reviewed With: patient, spouse  Progress: improving  Outcome Summary: Vss, having pain in mouth/nystatin ordered, pt on airvo/maintaing well, o2 drops with activity, ltach referral in progress, wife updated, will cont to monitor.

## 2021-02-10 NOTE — THERAPY TREATMENT NOTE
Acute Care - Physical Therapy Treatment Note  [facility]     Patient Name: Alfie Joseph  : 1945  MRN: [id number]  Today's Date: 2/10/2021           PT Assessment (last 12 hours)      PT Evaluation and Treatment     Row Name 02/10/21 1008          Physical Therapy Time and Intention    Subjective Information  no complaints  -JA     Document Type  therapy note (daily note)  -SHANNAN     Mode of Treatment  individual therapy;physical therapy  -     Patient Effort  good  -     Row Name 02/10/21 1008          General Information    Patient Profile Reviewed  yes  -SHANNAN     Existing Precautions/Restrictions  fall;oxygen therapy device and L/min  -     Row Name 02/10/21 1008          Cognition    Orientation Status (Cognition)  oriented x 4  -     Row Name 02/10/21 1008          Pain Scale: Numbers Pre/Post-Treatment    Pretreatment Pain Rating  0/10 - no pain  -     Posttreatment Pain Rating  0/10 - no pain  -[facility] Name 02/10/21 1008          Bed Mobility    Scooting/Bridging [place] (Bed Mobility)  modified independence  -     Supine-Sit [place] (Bed Mobility)  modified independence  -     Sit-Supine [place] (Bed Mobility)  modified independence  -[facility] Name 02/10/21 1008          Transfers    Transfers  sit-stand transfer;stand-sit transfer  -     Sit-Stand [place] (Transfers)  standby assist  -     Stand-Sit [place] (Transfers)  standby assist  -[facility] Name 02/10/21 1008          Sit-Stand Transfer    Assistive Device (Sit-Stand Transfers)  other (see comments) no AD  -JA     Row Name 02/10/21 1008          Stand-Sit Transfer    Assistive Device (Stand-Sit Transfers)  other (see comments) no AD   -JA     Row Name 02/10/21 1008          Gait/Stairs (Locomotion)    [place] Level (Gait)  contact guard  -     Assistive Device (Gait)  other (see comments) no AD  -JA     Distance in Feet (Gait)  20', 24'x2  -JA     Pattern (Gait)  step-through  -JA      Deviations/Abnormal Patterns (Gait)  stride length decreased  -     Comment (Gait/Stairs)  O2 drop to 91% with 1st gt attempt, but remaining gt attempts drop to 92% with recovery to >93%. Pt. amb. [id number] due to airvo   -     Row Name 02/10/21 1008          Safety Issues, Functional Mobility    Impairments Affecting Function (Mobility)  endurance/activity tolerance;shortness of breath  -     Row Name 02/10/21 1008          Hip (Therapeutic Exercise)    Hip AROM (Therapeutic Exercise)  bilateral;flexion 2x10  -[facility] Name 02/10/21 1008          Knee (Therapeutic Exercise)    Knee AROM (Therapeutic Exercise)  bilateral;LAQ (long arc quad) 2x10  -     Row Name 02/10/21 1008          Ankle (Therapeutic Exercise)    Ankle AROM (Therapeutic Exercise)  bilateral;dorsiflexion;plantarflexion 2x20  -[facility] Name 02/10/21 1008          Vital Signs    Post Systolic BP Rehab  120  -JA     Post Treatment Diastolic BP  65  -     Pretreatment Heart Rate (beats/min)  74  -JA     Intratreatment Heart Rate (beats/min)  76  -JA     Posttreatment Heart Rate (beats/min)  84  -     Pre SpO2 (%)  94  -     O2 Delivery Pre Treatment  hi-flow  -     Intra SpO2 (%)  91  -JA     O2 Delivery Intra Treatment  hi-flow  -JA     Post SpO2 (%)  94  -JA     O2 Delivery Post Treatment  hi-flow  -JA     Pre Patient Position  Supine  -     Intra Patient Position  Standing  -     Post Patient Position  Sitting  -[facility] Name 02/10/21 1008          Transfer Goal 1 (PT)    Activity/Assistive Device (Transfer Goal 1, PT)  sit-to-stand/stand-to-sit;bed-to-chair/chair-to-bed  -     [place] Level/Cues Needed (Transfer Goal 1, PT)  standby assist;independent O2 sats will not drop below 92%  -     Time Frame (Transfer Goal 1, PT)  by discharge  -     Progress/Outcome (Transfer Goal 1, PT)  goal partially met  -[facility] Name 02/10/21 1008          Gait Training Goal 1 (PT)    Activity/Assistive Device (Gait Training  Goal 1, PT)  gait (walking locomotion)  -     [place] Level (Gait Training Goal 1, PT)  contact guard assist;standby assist  -     Distance (Gait Training Goal 1, PT)  30ft or more x3 O2 sats will not drop below 92%  -     Time Frame (Gait Training Goal 1, PT)  by discharge  -     Progress/Outcome (Gait Training Goal 1, PT)  goal not met  -     Row Name 02/10/21 1008          ROM Goal 1 (PT)    ROM Goal 1 (PT)  Pt will tolerate LE exercises OOB in chair with VSS  -     Time Frame (ROM Goal 1, PT)  by discharge  -     Progress/Outcome (ROM Goal 1, PT)  goal partially met  -     Row Name 02/10/21 1008          Positioning and Restraints    Pre-Treatment Position  in bed  -     Post Treatment Position  bed  -     In Bed  supine;call light within reach;encouraged to call for assist;exit alarm on  -     Row Name 02/10/21 1008          Therapy Assessment/Plan (PT)    Rehab Potential (PT)  good, to achieve stated therapy goals  -     Criteria for Skilled Interventions Met (PT)  yes;skilled treatment is necessary;meets criteria  -     Row Name 02/10/21 1008          Progress Summary (PT)    Progress Toward Functional Goals (PT)  progress toward functional goals as expected  -       User Key  (r) = Recorded By, (t) = Taken By, (c) = Cosigned By    Initials Name Provider Type    Hussain Eckert, TEENA Physical Therapy Assistant        Physical Therapy Education                 Title: PT OT SLP Therapies (In Progress)     Topic: Physical Therapy (In Progress)     Point: Mobility training (In Progress)     Learning Progress Summary           Patient Acceptance, E, NR by STEPHANIE at 2/8/2021 1653                   Point: Home exercise program (Not Started)     Learner Progress:  Not documented in this visit.          Point: Body mechanics (In Progress)     Learning Progress Summary           Patient Acceptance, E, NR by STEPHANIE at 2/8/2021 1653                   Point: Precautions (In Progress)      Learning Progress Summary           Patient Acceptance, E, NR by STEPHANIE at 2/8/2021 1653                               User Key     Initials Effective Dates Name Provider Type Discipline     04/03/18 -  Silvia Gonzalez PT Physical Therapist PT              PT Recommendation and Plan  Anticipated Discharge Disposition (PT): home with assist, home with home health  Therapy Frequency (PT): other (see comments)(5-7 days /wk)  Progress Summary (PT)  Progress Toward Functional Goals (PT): progress toward functional goals as expected  Plan of Care Reviewed With: patient  Progress: improving  Outcome Summary: Pt. with good tolerance to gt in room with O2 SATS stable during performance. Pt. Mod Independent with sup-sit-sup, sit-stand-sit SBA, amb. 20' + 24'x2 w/o AD CGA [id number] this a.m., pt. performed 2x10 reps seated therex at EOB. Cont. gt & therex       Time Calculation:   PT Charges     Row Name 02/10/21 1059             Time Calculation    Start Time  1008  -JA      Stop Time  1101  -JA      Time Calculation (min)  53 min  -JA         Time Calculation- PT    Total Timed Code Minutes- PT  53 minute(s)  -JA        User Key  (r) = Recorded By, (t) = Taken By, (c) = Cosigned By    Initials Name Provider Type    Hussain Eckert PTA Physical Therapy Assistant        Therapy Charges for Today     Code Description Service Date Service Provider Modifiers Qty    83360994579 HC PT THERAPEUTIC ACT EA 15 MIN 2/9/2021 Hussain Victoria, PTA GP 1    60298202264 HC PT THER PROC EA 15 MIN 2/9/2021 Hussain Victoria, PTA GP 2    78332756020 HC PT THERAPEUTIC ACT EA 15 MIN 2/10/2021 Hussain Victoria, PTA GP 1    54385127609 HC PT THER PROC EA 15 MIN 2/10/2021 Hussain Victoria, PTA GP 1    72662411553 HC GAIT TRAINING EA 15 MIN 2/10/2021 Hussain iVctoria, PTA GP 2          PT G-Codes  Outcome Measure Options: AM-PAC 6 Clicks Daily Activity (OT)  AM-PAC 6 Clicks Score (PT): 20  AM-PAC 6 Clicks Score (OT):  20    Hussain Victoria, PTA  2/10/2021

## 2021-02-10 NOTE — PLAN OF CARE
Goal Outcome Evaluation:  Plan of Care Reviewed With: patient  Progress: improving  Outcome Summary: Pt. with good tolerance to gt in room with O2 SATS stable during performance. Pt. Mod Independent with sup-sit-sup, sit-stand-sit SBA, amb. 20' + 24'x2 w/o AD CGA [id number] this a.m., pt. performed 2x10 reps seated therex at EOB. Cont. gt & therex

## 2021-02-10 NOTE — PROGRESS NOTES
Progress Note  Oral Anguiano MD  Hospitalist    Date of visit: 2/10/2021     LOS: 7 days   Patient Care Team:  Clifford Weston MD as PCP - General    Chief Complaint: dyspnea    Subjective     Interval History:     Patient Complaints: dyspnea / improved to some extent. Continues to require high flow supplemental Oxygen at 60 L/min to maintain an Oxygen saturation of 94%    History taken from: patient    Medication Review:   Current Facility-Administered Medications   Medication Dose Route Frequency Provider Last Rate Last Admin   • acetaminophen (TYLENOL) tablet 500 mg  500 mg Oral Q8H PRN Prashant Luna MD       • albuterol sulfate HFA (PROVENTIL HFA;VENTOLIN HFA;PROAIR HFA) inhaler 2 puff  2 puff Inhalation 4x Daily - RT Alfie Willis MD   2 puff at 02/10/21 0812   • ascorbic acid (VITAMIN C) tablet 1,000 mg  1,000 mg Oral Daily Prashant Luna MD   1,000 mg at 02/10/21 0854   • aspirin EC tablet 81 mg  81 mg Oral Daily Prashant Luna MD   81 mg at 02/10/21 0855   • atorvastatin (LIPITOR) tablet 40 mg  40 mg Oral Daily Prashant Luna MD   40 mg at 02/10/21 0854   • baricitinib (OLUMIANT) tablet 4 mg  4 mg Oral Daily Rodrigo Finney MD   4 mg at 02/10/21 0855   • budesonide-formoterol (SYMBICORT) 160-4.5 MCG/ACT inhaler 2 puff  2 puff Inhalation BID - RT Alfie Willis MD   2 puff at 02/10/21 0812   • cetirizine (zyrTEC) tablet 10 mg  10 mg Oral Daily Prashant Luna MD   10 mg at 02/10/21 0854   • dexamethasone (DECADRON) injection 6 mg  6 mg Intravenous BID Alfie Willis MD   6 mg at 02/10/21 0853   • dextrose (D50W) 25 g/ 50mL Intravenous Solution 25 g  25 g Intravenous Q15 Min PRN Prashant Luna MD       • dextrose (GLUTOSE) oral gel 15 g  15 g Oral Q15 Min PRN Prashant Luna MD       • enoxaparin (LOVENOX) syringe 40 mg  40 mg Subcutaneous Nightly Prashant Luna MD   40 mg at 02/09/21 3769   • glucagon (human recombinant) (GLUCAGEN DIAGNOSTIC) injection 1 mg  1 mg  Subcutaneous Q15 Min PRN Prashant Luna MD       • insulin aspart (novoLOG) injection 0-14 Units  0-14 Units Subcutaneous TID AC Alfie Willis MD   8 Units at 02/10/21 1116   • insulin detemir (LEVEMIR) injection 20 Units  20 Units Subcutaneous Nightly Oral Anguiano MD       • ipratropium (ATROVENT HFA) inhaler 2 puff  2 puff Inhalation 4x Daily - RT Alfie Willis MD   2 puff at 02/10/21 0812   • levothyroxine (SYNTHROID, LEVOTHROID) tablet 125 mcg  125 mcg Oral Q AM Prashant Luna MD   125 mcg at 02/10/21 0624   • multivitamin (THERAGRAN) tablet 1 tablet  1 tablet Oral Daily Prashant Luna MD   1 tablet at 02/10/21 0854   • pantoprazole (PROTONIX) EC tablet 40 mg  40 mg Oral QAM Prashant Luna MD   40 mg at 02/10/21 0616   • Pharmacy Consult - Pharmacy to dose   Does not apply Continuous PRN Rodrigo Finney MD       • Pharmacy Consult - Remdesivir   Does not apply Continuous PRN Prashant Luna MD       • remdesivir 100 mg in sodium chloride 0.9 % 250 mL IVPB (powder vial)  100 mg Intravenous Q24H Rodrigo Finney MD   Currently Infusing at 02/09/21 2135   • sodium chloride 0.9 % flush 10 mL  10 mL Intravenous PRN Prashant Luna MD   10 mL at 02/10/21 0853   • sodium chloride 0.9 % infusion  50 mL/hr Intravenous Continuous Prashant Luna MD 50 mL/hr at 02/05/21 2218 50 mL/hr at 02/05/21 2218   • tamsulosin (FLOMAX) 24 hr capsule 0.4 mg  0.4 mg Oral Daily Prashant Luna MD   0.4 mg at 02/10/21 0854       Review of Systems:   Review of Systems   Constitutional: Positive for fatigue. Negative for fever.   Respiratory: Positive for cough and shortness of breath. Negative for wheezing.    Cardiovascular: Negative for chest pain, palpitations and leg swelling.   Gastrointestinal: Negative for abdominal distention, abdominal pain, blood in stool, diarrhea, nausea and vomiting.   Genitourinary: Negative for dysuria, frequency, genital sores, hematuria and urgency.   Musculoskeletal:  Negative for arthralgias, back pain and gait problem.   Skin: Positive for pallor. Negative for color change and rash.   Neurological: Positive for weakness. Negative for seizures, speech difficulty and headaches.   Psychiatric/Behavioral: Negative for agitation, behavioral problems and confusion.   All other systems reviewed and are negative.      Objective     Vital Signs  Temp:  [96.5 °F (35.8 °C)-97.3 °F (36.3 °C)] 96.7 °F (35.9 °C)  Heart Rate:  [56-86] 74  Resp:  [18-22] 20  BP: (112-141)/(61-70) 120/65    Physical Exam:  Physical Exam  Vitals signs reviewed.   Constitutional:       General: He is not in acute distress.     Appearance: He is ill-appearing.   HENT:      Head: Normocephalic and atraumatic.   Eyes:      General: No scleral icterus.     Extraocular Movements: Extraocular movements intact.      Pupils: Pupils are equal, round, and reactive to light.   Neck:      Musculoskeletal: Normal range of motion and neck supple. No neck rigidity or muscular tenderness.   Cardiovascular:      Rate and Rhythm: Normal rate and regular rhythm.   Pulmonary:      Effort: No respiratory distress.      Breath sounds: No stridor. Rales present. No rhonchi.   Abdominal:      General: There is no distension.      Palpations: There is no mass.      Tenderness: There is no abdominal tenderness.   Musculoskeletal: Normal range of motion.         General: No swelling, tenderness or deformity.   Skin:     General: Skin is warm and dry.      Coloration: Skin is pale. Skin is not jaundiced.      Findings: No bruising.   Neurological:      General: No focal deficit present.      Mental Status: He is alert and oriented to person, place, and time.      Cranial Nerves: No cranial nerve deficit.      Sensory: No sensory deficit.   Psychiatric:         Mood and Affect: Mood normal.         Behavior: Behavior normal.          Results Review:    Lab Results (last 24 hours)     Procedure Component Value Units Date/Time    POC Glucose  Once [[id number]]  (Abnormal) Collected: 02/10/21 1045    Specimen: Blood Updated: 02/10/21 1112     Glucose 281 mg/dL      Comment: Result Not ConfirmedOperator: [id number] MARGARITO PERLAMeter ID: [id number]       POC Glucose Once [[id number]]  (Abnormal) Collected: 02/10/21 0701    Specimen: Blood Updated: 02/10/21 0724     Glucose 283 mg/dL      Comment: RN NotifiedOperator: [id number] MARGARITO ANGELITAMeter ID: [id number]       Ferritin [[id number]]  (Abnormal) Collected: 02/10/21 0654    Specimen: Blood Updated: 02/10/21 0721     Ferritin 1,011.00 ng/mL     Narrative:      Results may be falsely decreased if patient taking Biotin.      Comprehensive Metabolic Panel [[id number]]  (Abnormal) Collected: 02/10/21 0654    Specimen: Blood Updated: 02/10/21 0715     Glucose 313 mg/dL      BUN 24 mg/dL      Creatinine 0.82 mg/dL      Sodium 133 mmol/L      Potassium 4.5 mmol/L      Chloride 96 mmol/L      CO2 29.0 mmol/L      Calcium 9.2 mg/dL      Total Protein 6.4 g/dL      Albumin 3.10 g/dL      ALT (SGPT) 35 U/L      AST (SGOT) 23 U/L      Alkaline Phosphatase 66 U/L      Total Bilirubin 0.6 mg/dL      eGFR Non African Amer 92 mL/min/1.73      Globulin 3.3 gm/dL      A/G Ratio 0.9 g/dL      BUN/Creatinine Ratio 29.3     Anion Gap 8.0 mmol/L     Narrative:      GFR Normal >60  Chronic Kidney Disease <60  Kidney Failure <15      CK [[id number]]  (Normal) Collected: 02/10/21 0654    Specimen: Blood Updated: 02/10/21 0715     Creatine Kinase 47 U/L     C-reactive Protein [[id number]]  (Abnormal) Collected: 02/10/21 0654    Specimen: Blood Updated: 02/10/21 0715     C-Reactive Protein 1.10 mg/dL     Lactate Dehydrogenase [[id number]]  (Abnormal) Collected: 02/10/21 0654    Specimen: Blood Updated: 02/10/21 0715      U/L     Bilirubin, Direct [[id number]]  (Normal) Collected: 02/10/21 0654    Specimen: Blood Updated: 02/10/21 0715     Bilirubin, Direct 0.2 mg/dL     D-dimer, Quantitative [[id number]]  (Abnormal)  Collected: 02/10/21 0654    Specimen: Blood Updated: 02/10/21 0713     D-Dimer, Quantitative 973 ng/mL (FEU)     Narrative:      Dimer values <500 ng/ml FEU are FDA approved as aid in diagnosis of deep venous thrombosis and pulmonary embolism.  This test should not be used in an exclusion strategy with pretest probability alone.    A recent guideline regarding diagnosis for pulmonary thromboembolism recommends an adjusted exclusion criterion of age x 10 ng/ml FEU for patients >50 years of age ([employer] Intern Med 2015; 163: 701-711).      Fibrinogen [[id number]]  (Normal) Collected: 02/10/21 0654    Specimen: Blood Updated: 02/10/21 0711     Fibrinogen 422 mg/dL     CBC & Differential [[id number]]  (Abnormal) Collected: 02/10/21 0654    Specimen: Blood Updated: 02/10/21 0656    Narrative:      The following orders were created for panel order CBC & Differential.  Procedure                               Abnormality         Status                     ---------                               -----------         ------                     CBC Auto Differential[[id number]]        Abnormal            Final result                 Please view results for these tests on the individual orders.    CBC Auto Differential [[id number]]  (Abnormal) Collected: 02/10/21 0654    Specimen: Blood Updated: 02/10/21 0656     WBC 10.35 10*3/mm3      RBC 4.67 10*6/mm3      Hemoglobin 14.8 g/dL      Hematocrit 41.8 %      MCV 89.5 fL      MCH 31.7 pg      MCHC 35.4 g/dL      RDW 12.3 %      RDW-SD 40.1 fl      MPV 9.2 fL      Platelets 553 10*3/mm3      Neutrophil % 80.3 %      Lymphocyte % 7.4 %      Monocyte % 8.9 %      Eosinophil % 0.0 %      Basophil % 0.3 %      Immature Grans % 3.1 %      Neutrophils, Absolute 8.31 10*3/mm3      Lymphocytes, Absolute 0.77 10*3/mm3      Monocytes, Absolute 0.92 10*3/mm3      Eosinophils, Absolute 0.00 10*3/mm3      Basophils, Absolute 0.03 10*3/mm3      Immature Grans, Absolute 0.32 10*3/mm3      nRBC 0.0 /100  WBC     POC Glucose Once [[id number]]  (Abnormal) Collected: 02/09/21 1927    Specimen: Blood Updated: 02/1945     Glucose 379 mg/dL      Comment: RN NotifiedOperator: [id number] JUSTYNA JOSEPHLMeter ID: [id number]       POC Glucose Once [[id number]]  (Abnormal) Collected: 02/09/21 1620    Specimen: Blood Updated: 02/09/21 1636     Glucose 205 mg/dL      Comment: Result Not ConfirmedOperator: [id number] MARGARITO PERLAMeter ID: [id number]             Imaging Results (Last 24 Hours)     Procedure Component Value Units Date/Time    XR Chest 1 View [[id number]] Collected: 02/09/21 1438     Updated: 02/09/21 1504    Narrative:      EXAM DESCRIPTION:     XR CHEST 1 VW    CLINICAL HISTORY:     76 years  Male  Covid pneumonia, U07.1 COVID-19 J12.82 Pneumonia  due to Coronavirus disease 2019 J96.01 Acute respiratory failure  with hypoxia Z74.09 Other reduced mobility Z78.9 Other specified  health status Z74.09 Other reduced mobility    COMPARISON:     February 5, 2021    TECHNIQUE:     One view-AP portable radiograph the chest    FINDINGS:     There are persistent bilateral patchy peripheral infiltrates  again noted. No appreciable change is identified when compared to  the prior exam. The findings are compatible with patient's given  history of viral pneumonia/Covid 19.    The cardiac silhouette and pulmonary vasculature are within  normal limits. There are no pleural effusions.      Impression:        1.  Persistent bilateral vague peripheral patchy infiltrates  compatible with the patient's given history of Covid 19. No  appreciable changes identified.    Commonly reported imaging features of COVID-19 pneumonia are  present. Other processes such as influenza pneumonia and  organizing pneumonia, as can be seen with drug toxicity and  connective tissue disease, can cause similar imaging pattern.  [PneTyp]        Electronically signed by:  Neisha Call MD  2/9/2021 3:03 PM CST  Workstation: [id number]           Assessment/Plan       Acute respiratory failure (CMS/HCC)    Pneumonia due to COVID-19 virus    Diabetes mellitus (CMS/HCC)    Continue with supplemental Oxygen, IV Decadron / Remdesivir, Olumiant, supportive care. Increase the dose of Levemir for a better diabetic control. No changes noted on the recent CXRay.    Obtain Case Management consult for possible LTAC consult.    Oral Anguiano MD  02/10/21  11:29 CST

## 2021-02-10 NOTE — THERAPY TREATMENT NOTE
Patient Name: Alfie Joseph  : 1945    MRN: [id number]                              Today's Date: 2/10/2021       Admit Date: 2/3/2021    Visit Dx:     ICD-10-CM ICD-9-CM   1. Pneumonia due to COVID-19 virus  U07.1 480.8    J12.82 079.89   2. Acute respiratory failure with hypoxia (CMS/[facility])  J96.01 518.81   3. Impaired mobility and activities of daily living  Z74.09 V49.89    Z78.9    4. Impaired functional mobility, balance, gait, and endurance  Z74.09 V49.89     Patient Active Problem List   Diagnosis   • Aortic valve disorder   • Coronary artery disease involving native coronary artery of native heart without angina pectoris   • Hyperlipidemia   • COVID-19   • Pneumonia due to COVID-19 virus   • Acute respiratory failure (CMS/[facility])   • Diabetes mellitus (CMS/[facility])     Past Medical History:   Diagnosis Date   • Acquired hypothyroidism    • Acute bronchitis    • Allergic rhinitis    • Aortic valve disorder     Aortic valve disorder - AVR   • Aortic valve sclerosis    • Benign prostatic hyperplasia    • Coronary arteriosclerosis    • Coronary atherosclerosis of native coronary artery     Coronary atherosclerosis of native coronary artery - CABG   • Diabetes (CMS/[facility])    • Exanthematous disorder    • GERD (gastroesophageal reflux disease)    • Hemorrhoids     Hemorrhoids - internal & external   • History of echocardiogram 10/26/2015    Echocardiogram W/ color flow 85667 (2) - Limited 2D echocardiogram. Normal prosthetic AV.   • History of echocardiogram 2013    Echocardiogram W/O color flow 16543 (3) - Mildly depressed LV systolic function with EF of 45-50%. Moderate CLVH. Grade I diastolic dysfunction of the left ventricular myocardium. No evidence of pericardial effusion.   • Hyperlipidemia    • Hypothyroidism    • Infection of skin and subcutaneous tissue     Infection of skin AND/OR subcutaneous tissue   • Tinnitus    • Type 2 diabetes mellitus (CMS/[facility])    • Urinary tract infectious disease      Past  Surgical History:   Procedure Laterality Date   • CARDIAC CATHETERIZATION  10/23/2013    Cardiac cath 07145 (1) - Multivessel coronary artery disease. Moderate AV stenosis. Mild aortic valve regurgitation.   • CORONARY ARTERY BYPASS GRAFT     • ENDOSCOPY  01/25/2010    Colon endoscopy 69394 (1) - internal external hemorrhoids. Otherwise normal   • GALLBLADDER SURGERY     • HYDROCELECTOMY      Remove hydrocele (1)   • INJECTION OF MEDICATION  05/21/2012    Kenalog (1) - ELLI PEPE (Monica ELYtorres)     General Information     Row Name 02/10/21 0907          OT Time and Intention    Document Type  therapy note (daily note)  -CS     Mode of Treatment  occupational therapy  -CS     Row Name 02/10/21 0907          General Information    Existing Precautions/Restrictions  fall;oxygen therapy device and L/min  -CS     Row Name 02/10/21 0907          Cognition    Orientation Status (Cognition)  oriented x 4  -CS     Row Name 02/10/21 0907          Safety Issues, Functional Mobility    Impairments Affecting Function (Mobility)  endurance/activity tolerance;shortness of breath  -CS       User Key  (r) = Recorded By, (t) = Taken By, (c) = Cosigned By    Initials Name Provider Type    CS Liz Marquez, CHARLIE/FAVIOLA Occupational Therapy Assistant          Mobility/ADL's     Row Name 02/10/21 0907          Bed Mobility    Bed Mobility  sit-supine;scooting/bridging  -CS     Scooting/Bridging [place] (Bed Mobility)  modified independence  -CS     Sit-Supine [place] (Bed Mobility)  modified independence  -CS     Assistive Device (Bed Mobility)  head of bed elevated  -CS     Row Name 02/10/21 0907          Transfers    Transfers  sit-stand transfer  -CS     Sit-Stand [place] (Transfers)  standby assist  -CS     Row Name 02/10/21 0907          Functional Mobility    Functional Mobility- Ind. Level  contact guard assist  -CS     Functional Mobility-Distance (Feet)  8  -CS     Row Name 02/10/21 0907          Activities of  Daily Living    BADL Assessment/Intervention  bathing  -     Row Name 02/10/21 0907          Bathing Assessment/Intervention    Comment (Bathing)  Pt stated that he already had a bath.  -       User Key  (r) = Recorded By, (t) = Taken By, (c) = Cosigned By    Initials Name Provider Type    Liz Davis COTA/L Occupational Therapy Assistant        Obj/Interventions     Row Name 02/10/21 0907          Shoulder (Therapeutic Exercise)    Shoulder (Therapeutic Exercise)  strengthening exercise  -     Shoulder Strengthening (Therapeutic Exercise)  bilateral;flexion;horizontal aBduction/aDduction;resisted diagonal exercise;scapular stabilization;resistance band;blue  -     Row Name 02/10/21 0907          Elbow/Forearm (Therapeutic Exercise)    Elbow/Forearm (Therapeutic Exercise)  strengthening exercise  -     Elbow/Forearm Strengthening (Therapeutic Exercise)  bilateral;flexion;sitting;blue;resistance band  -     Row Name 02/10/21 0907          Therapeutic Exercise    Therapeutic Exercise  shoulder;elbow/forearm  -       User Key  (r) = Recorded By, (t) = Taken By, (c) = Cosigned By    Initials Name Provider Type    Liz Davis COTA/FAVIOLA Occupational Therapy Assistant        Goals/Plan     Row Name 02/10/21 0907          Bed Mobility Goal 1 (OT)    Activity/Assistive Device (Bed Mobility Goal 1, OT)  sit to supine/supine to sit  -CS     [place] Level/Cues Needed (Bed Mobility Goal 1, OT)  independent  -CS     Time Frame (Bed Mobility Goal 1, OT)  long term goal (LTG);by discharge  -CS     Progress/Outcomes (Bed Mobility Goal 1, OT)  goal not met  -     Row Name 02/10/21 0907          Transfer Goal 1 (OT)    Activity/Assistive Device (Transfer Goal 1, OT)  sit-to-stand/stand-to-sit;toilet  -CS     [place] Level/Cues Needed (Transfer Goal 1, OT)  standby assist;verbal cues required;tactile cues required  -CS     Time Frame (Transfer Goal 1, OT)  long term goal (LTG);by discharge   -CS     Progress/Outcome (Transfer Goal 1, OT)  goal not met  -CS     Row Name 02/10/21 0907          Bathing Goal 1 (OT)    Activity/Device (Bathing Goal 1, OT)  lower body bathing  -CS     [place] Level/Cues Needed (Bathing Goal 1, OT)  standby assist;verbal cues required;tactile cues required  -CS     Time Frame (Bathing Goal 1, OT)  long term goal (LTG);by discharge  -CS     Progress/Outcomes (Bathing Goal 1, OT)  goal not met  -CS     Row Name 02/10/21 0907          Dressing Goal 1 (OT)    Activity/Device (Dressing Goal 1, OT)  lower body dressing  -CS     [place]/Cues Needed (Dressing Goal 1, OT)  standby assist;verbal cues required;tactile cues required  -CS     Time Frame (Dressing Goal 1, OT)  long term goal (LTG);by discharge  -CS     Progress/Outcome (Dressing Goal 1, OT)  goal not met  -CS     Row Name 02/10/21 0907          Toileting Goal 1 (OT)    Activity/Device (Toileting Goal 1, OT)  toileting skills, all  -CS     [place] Level/Cues Needed (Toileting Goal 1, OT)  standby assist;verbal cues required;tactile cues required  -CS     Time Frame (Toileting Goal 1, OT)  long term goal (LTG);by discharge  -CS     Progress/Outcome (Toileting Goal 1, OT)  goal not met  -CS       User Key  (r) = Recorded By, (t) = Taken By, (c) = Cosigned By    Initials Name Provider Type    CS Liz Marquez COTA/FAVIOLA Occupational Therapy Assistant        Clinical Impression     Row Name 02/10/21 0907          Pain Scale: Numbers Pre/Post-Treatment    Pretreatment Pain Rating  0/10 - no pain  -CS     Posttreatment Pain Rating  0/10 - no pain  -CS     Row Name 02/10/21 0907          Plan of Care Review    Plan of Care Reviewed With  patient  -CS     Progress  improving  -CS     Row Name 02/10/21 0907          Therapy Assessment/Plan (OT)    Rehab Potential (OT)  good, to achieve stated therapy goals  -CS     Criteria for Skilled Therapeutic Interventions Met (OT)  yes;meets criteria;skilled treatment is  necessary  -CS     Row Name 02/10/21 0907          Therapy Plan Review/Discharge Plan (OT)    Anticipated Discharge Disposition (OT)  home with home health  -CS     Row Name 02/10/21 0907          Vital Signs    Pretreatment Heart Rate (beats/min)  75  -CS     Pre SpO2 (%)  91  -CS     O2 Delivery Pre Treatment  hi-flow  -CS     Post SpO2 (%)  90  -CS     O2 Delivery Post Treatment  hi-flow  -CS     Pre Patient Position  Sitting  -CS     Post Patient Position  Supine  -CS     Row Name 02/10/21 0907          Positioning and Restraints    Pre-Treatment Position  sitting in chair/recliner  -CS     Post Treatment Position  bed  -CS     In Bed  supine;call light within reach;encouraged to call for assist;exit alarm on  -CS       User Key  (r) = Recorded By, (t) = Taken By, (c) = Cosigned By    Initials Name Provider Type    Liz Davis COTA/L Occupational Therapy Assistant        Outcome Measures    No documentation.       Occupational Therapy Education                 Title: PT OT SLP Therapies (In Progress)     Topic: Occupational Therapy (In Progress)     Point: ADL training (Not Started)     Description:   Instruct learner(s) on proper safety adaptation and remediation techniques during self care or transfers.   Instruct in proper use of assistive devices.              Learner Progress:  Not documented in this visit.          Point: Home exercise program (Not Started)     Description:   Instruct learner(s) on appropriate technique for monitoring, assisting and/or progressing therapeutic exercises/activities.              Learner Progress:  Not documented in this visit.          Point: Precautions (Done)     Description:   Instruct learner(s) on prescribed precautions during self-care and functional transfers.              Learning Progress Summary           Patient Acceptance, E, VU by ME at 2/9/2021 6512    Comment: Educated on OT and POC. Educated to call for assistance. Educated on safety precautions.  Educated on proper body mechanics for BUE ther ex.    Acceptance, E, VU by  at 2/8/2021 1527    Comment: Pt educated on role of OT, d/c recs, and POC                   Point: Body mechanics (Done)     Description:   Instruct learner(s) on proper positioning and spine alignment during self-care, functional mobility activities and/or exercises.              Learning Progress Summary           Patient Acceptance, E, VU by ME at 2/9/2021 1433    Comment: Educated on OT and POC. Educated to call for assistance. Educated on safety precautions. Educated on proper body mechanics for BUE ther ex.                               User Key     Initials Effective Dates Name Provider Type Discipline    ME 08/24/20 -  Shari Ryder OTR/L Occupational Therapist OT     09/10/19 -  Des Marquez OT Occupational Therapist OT              OT Recommendation and Plan     Plan of Care Review  Plan of Care Reviewed With: patient  Progress: improving  Outcome Summary: Pt tolerated tx well this date. Pt gave good effort with UE ther ex. Pt was SBA with sit-stand t/f and chair-bed t/f. Continue OT POC.     Time Calculation:   Time Calculation- OT     Row Name 02/10/21 1153             Time Calculation- OT    OT Start Time  0907  -CS      OT Stop Time  0945  -CS      OT Time Calculation (min)  38 min  -CS      Total Timed Code Minutes- OT  38 minute(s)  -CS      OT Received On  02/10/21  -        User Key  (r) = Recorded By, (t) = Taken By, (c) = Cosigned By    Initials Name Provider Type    CS Liz Marquez GUERRA/L Occupational Therapy Assistant        Therapy Charges for Today     Code Description Service Date Service Provider Modifiers Qty    80964350725 HC OT THER PROC EA 15 MIN 2/10/2021 Liz Marquez GUERRA/L GO 1    70283951280 HC OT THERAPEUTIC ACT EA 15 MIN 2/10/2021 Liz Marquez GUERRA/L GO 1               CHARLIE Sands/FAVIOLA  2/10/2021

## 2021-02-11 LAB
ALBUMIN SERPL-MCNC: 3.3 G/DL (ref 3.5–5.2)
ALBUMIN/GLOB SERPL: 0.9 G/DL
ALP SERPL-CCNC: 70 U/L (ref 39–117)
ALT SERPL W P-5'-P-CCNC: 34 U/L (ref 1–41)
ANION GAP SERPL CALCULATED.3IONS-SCNC: 9 MMOL/L (ref 5–15)
AST SERPL-CCNC: 23 U/L (ref 1–40)
BASOPHILS # BLD AUTO: 0.06 10*3/MM3 (ref 0–0.2)
BASOPHILS NFR BLD AUTO: 0.6 % (ref 0–1.5)
BILIRUB SERPL-MCNC: 0.6 MG/DL (ref 0–1.2)
BUN SERPL-MCNC: 21 MG/DL (ref 8–23)
BUN/CREAT SERPL: 28.4 (ref 7–25)
CALCIUM SPEC-SCNC: 8.6 MG/DL (ref 8.6–10.5)
CHLORIDE SERPL-SCNC: 98 MMOL/L (ref 98–107)
CO2 SERPL-SCNC: 28 MMOL/L (ref 22–29)
CREAT SERPL-MCNC: 0.74 MG/DL (ref 0.76–1.27)
CRP SERPL-MCNC: 0.7 MG/DL (ref 0–0.5)
DEPRECATED RDW RBC AUTO: 39.8 FL (ref 37–54)
EOSINOPHIL # BLD AUTO: 0 10*3/MM3 (ref 0–0.4)
EOSINOPHIL NFR BLD AUTO: 0 % (ref 0.3–6.2)
ERYTHROCYTE [DISTWIDTH] IN BLOOD BY AUTOMATED COUNT: 12.1 % (ref 12.3–15.4)
GFR SERPL CREATININE-BSD FRML MDRD: 103 ML/MIN/1.73
GLOBULIN UR ELPH-MCNC: 3.6 GM/DL
GLUCOSE BLDC GLUCOMTR-MCNC: 259 MG/DL (ref 70–130)
GLUCOSE BLDC GLUCOMTR-MCNC: 289 MG/DL (ref 70–130)
GLUCOSE BLDC GLUCOMTR-MCNC: 297 MG/DL (ref 70–130)
GLUCOSE BLDC GLUCOMTR-MCNC: 327 MG/DL (ref 70–130)
GLUCOSE SERPL-MCNC: 250 MG/DL (ref 65–99)
HCT VFR BLD AUTO: 43.2 % (ref 37.5–51)
HGB BLD-MCNC: 15.4 G/DL (ref 13–17.7)
IMM GRANULOCYTES # BLD AUTO: 0.27 10*3/MM3 (ref 0–0.05)
IMM GRANULOCYTES NFR BLD AUTO: 2.5 % (ref 0–0.5)
LYMPHOCYTES # BLD AUTO: 0.82 10*3/MM3 (ref 0.7–3.1)
LYMPHOCYTES NFR BLD AUTO: 7.6 % (ref 19.6–45.3)
MCH RBC QN AUTO: 32 PG (ref 26.6–33)
MCHC RBC AUTO-ENTMCNC: 35.6 G/DL (ref 31.5–35.7)
MCV RBC AUTO: 89.8 FL (ref 79–97)
MONOCYTES # BLD AUTO: 0.91 10*3/MM3 (ref 0.1–0.9)
MONOCYTES NFR BLD AUTO: 8.4 % (ref 5–12)
NEUTROPHILS NFR BLD AUTO: 8.76 10*3/MM3 (ref 1.7–7)
NEUTROPHILS NFR BLD AUTO: 80.9 % (ref 42.7–76)
NRBC BLD AUTO-RTO: 0 /100 WBC (ref 0–0.2)
PLATELET # BLD AUTO: 529 10*3/MM3 (ref 140–450)
PMV BLD AUTO: 9.4 FL (ref 6–12)
POTASSIUM SERPL-SCNC: 4.7 MMOL/L (ref 3.5–5.2)
PROT SERPL-MCNC: 6.9 G/DL (ref 6–8.5)
RBC # BLD AUTO: 4.81 10*6/MM3 (ref 4.14–5.8)
SODIUM SERPL-SCNC: 135 MMOL/L (ref 136–145)
WBC # BLD AUTO: 10.82 10*3/MM3 (ref 3.4–10.8)

## 2021-02-11 PROCEDURE — 97535 SELF CARE MNGMENT TRAINING: CPT

## 2021-02-11 PROCEDURE — 63710000001 INSULIN ASPART PER 5 UNITS: Performed by: FAMILY MEDICINE

## 2021-02-11 PROCEDURE — 82962 GLUCOSE BLOOD TEST: CPT

## 2021-02-11 PROCEDURE — 94799 UNLISTED PULMONARY SVC/PX: CPT

## 2021-02-11 PROCEDURE — 97116 GAIT TRAINING THERAPY: CPT

## 2021-02-11 PROCEDURE — 85025 COMPLETE CBC W/AUTO DIFF WBC: CPT | Performed by: HOSPITALIST

## 2021-02-11 PROCEDURE — 25010000002 DEXAMETHASONE PER 1 MG: Performed by: FAMILY MEDICINE

## 2021-02-11 PROCEDURE — 86140 C-REACTIVE PROTEIN: CPT | Performed by: HOSPITALIST

## 2021-02-11 PROCEDURE — 25010000002 ENOXAPARIN PER 10 MG: Performed by: HOSPITALIST

## 2021-02-11 PROCEDURE — 63710000001 INSULIN DETEMIR PER 5 UNITS: Performed by: INTERNAL MEDICINE

## 2021-02-11 PROCEDURE — 94760 N-INVAS EAR/PLS OXIMETRY 1: CPT

## 2021-02-11 PROCEDURE — 80053 COMPREHEN METABOLIC PANEL: CPT | Performed by: HOSPITALIST

## 2021-02-11 RX ADMIN — IPRATROPIUM BROMIDE 2 PUFF: 17 AEROSOL, METERED RESPIRATORY (INHALATION) at 08:40

## 2021-02-11 RX ADMIN — BUDESONIDE AND FORMOTEROL FUMARATE DIHYDRATE 2 PUFF: 160; 4.5 AEROSOL RESPIRATORY (INHALATION) at 18:54

## 2021-02-11 RX ADMIN — ASPIRIN 81 MG: 81 TABLET, COATED ORAL at 08:47

## 2021-02-11 RX ADMIN — INSULIN ASPART 8 UNITS: 100 INJECTION, SOLUTION INTRAVENOUS; SUBCUTANEOUS at 08:47

## 2021-02-11 RX ADMIN — NYSTATIN 500000 UNITS: 500000 SUSPENSION ORAL at 08:49

## 2021-02-11 RX ADMIN — IPRATROPIUM BROMIDE 2 PUFF: 17 AEROSOL, METERED RESPIRATORY (INHALATION) at 15:37

## 2021-02-11 RX ADMIN — LEVOTHYROXINE SODIUM 125 MCG: 125 TABLET ORAL at 06:04

## 2021-02-11 RX ADMIN — ENOXAPARIN SODIUM 40 MG: 40 INJECTION SUBCUTANEOUS at 21:23

## 2021-02-11 RX ADMIN — INSULIN DETEMIR 20 UNITS: 100 INJECTION, SOLUTION SUBCUTANEOUS at 21:24

## 2021-02-11 RX ADMIN — IPRATROPIUM BROMIDE 2 PUFF: 17 AEROSOL, METERED RESPIRATORY (INHALATION) at 18:54

## 2021-02-11 RX ADMIN — PANTOPRAZOLE SODIUM 40 MG: 40 TABLET, DELAYED RELEASE ORAL at 06:04

## 2021-02-11 RX ADMIN — CETIRIZINE HYDROCHLORIDE 10 MG: 10 TABLET, FILM COATED ORAL at 08:47

## 2021-02-11 RX ADMIN — OXYCODONE HYDROCHLORIDE AND ACETAMINOPHEN 1000 MG: 500 TABLET ORAL at 08:47

## 2021-02-11 RX ADMIN — ATORVASTATIN CALCIUM 40 MG: 40 TABLET, FILM COATED ORAL at 08:47

## 2021-02-11 RX ADMIN — NYSTATIN 500000 UNITS: 500000 SUSPENSION ORAL at 21:23

## 2021-02-11 RX ADMIN — NYSTATIN 500000 UNITS: 500000 SUSPENSION ORAL at 17:22

## 2021-02-11 RX ADMIN — REMDESIVIR 100 MG: 100 INJECTION, POWDER, LYOPHILIZED, FOR SOLUTION INTRAVENOUS at 21:23

## 2021-02-11 RX ADMIN — BUDESONIDE AND FORMOTEROL FUMARATE DIHYDRATE 2 PUFF: 160; 4.5 AEROSOL RESPIRATORY (INHALATION) at 08:40

## 2021-02-11 RX ADMIN — ALBUTEROL SULFATE 2 PUFF: 90 AEROSOL, METERED RESPIRATORY (INHALATION) at 08:41

## 2021-02-11 RX ADMIN — SODIUM CHLORIDE, PRESERVATIVE FREE 10 ML: 5 INJECTION INTRAVENOUS at 08:49

## 2021-02-11 RX ADMIN — NYSTATIN 500000 UNITS: 500000 SUSPENSION ORAL at 11:31

## 2021-02-11 RX ADMIN — INSULIN ASPART 8 UNITS: 100 INJECTION, SOLUTION INTRAVENOUS; SUBCUTANEOUS at 11:31

## 2021-02-11 RX ADMIN — ALBUTEROL SULFATE 2 PUFF: 90 AEROSOL, METERED RESPIRATORY (INHALATION) at 18:54

## 2021-02-11 RX ADMIN — BARICITINIB 4 MG: 2 TABLET, FILM COATED ORAL at 09:19

## 2021-02-11 RX ADMIN — DEXAMETHASONE SODIUM PHOSPHATE 6 MG: 4 INJECTION, SOLUTION INTRA-ARTICULAR; INTRALESIONAL; INTRAMUSCULAR; INTRAVENOUS; SOFT TISSUE at 08:47

## 2021-02-11 RX ADMIN — DEXAMETHASONE SODIUM PHOSPHATE 6 MG: 4 INJECTION, SOLUTION INTRA-ARTICULAR; INTRALESIONAL; INTRAMUSCULAR; INTRAVENOUS; SOFT TISSUE at 21:23

## 2021-02-11 RX ADMIN — THERA TABS 1 TABLET: TAB at 08:47

## 2021-02-11 RX ADMIN — TAMSULOSIN HYDROCHLORIDE 0.4 MG: 0.4 CAPSULE ORAL at 08:47

## 2021-02-11 RX ADMIN — ALBUTEROL SULFATE 2 PUFF: 90 AEROSOL, METERED RESPIRATORY (INHALATION) at 15:38

## 2021-02-11 RX ADMIN — INSULIN ASPART 8 UNITS: 100 INJECTION, SOLUTION INTRAVENOUS; SUBCUTANEOUS at 17:22

## 2021-02-11 NOTE — PLAN OF CARE
Patient Goal Outcome Evaluation:  Plan of Care Reviewed With: patient   Patient less short of breath this shift. Denies pain/ discomfort.

## 2021-02-11 NOTE — PLAN OF CARE
Goal Outcome Evaluation:  Plan of Care Reviewed With: patient     Outcome Summary: pt sup<>sit with independence, sit<>stand with SBA, pt  ambulated without AD 90` with SBA, pt's O2 stats decreased to 87% with gait, pt instructed with PLB. pt would benefit from home with assist & HHPT @ D/C

## 2021-02-11 NOTE — PLAN OF CARE
Goal Outcome Evaluation:  Plan of Care Reviewed With: patient  Progress: improving  Outcome Summary: Vss, denies pain, accepted to [facility]/possibly go friday or saturday, will cont to monitor.

## 2021-02-11 NOTE — THERAPY TREATMENT NOTE
Acute Care - Physical Therapy Treatment Note  [facility]     Patient Name: Alfie Joseph  : 1945  MRN: [id number]  Today's Date: 2021           PT Assessment (last 12 hours)      PT Evaluation and Treatment     Row Name 21 1503          Physical Therapy Time and Intention    Subjective Information  no complaints  -TA     Document Type  therapy note (daily note)  -TA     Mode of Treatment  individual therapy;physical therapy  -TA     Patient Effort  good  -TA     Row Name 21 1503          General Information    Patient Profile Reviewed  yes  -TA     Existing Precautions/Restrictions  fall;oxygen therapy device and L/min  -TA     Row Name 21 1503          Cognition    Affect/Mental Status (Cognitive)  WFL  -TA     Orientation Status (Cognition)  oriented x 4  -TA     Personal Safety Interventions  fall prevention program maintained;gait belt;nonskid shoes/slippers when out of bed;supervised activity  -TA     Row Name 21 1503          Pain Scale: Numbers Pre/Post-Treatment    Pretreatment Pain Rating  0/10 - no pain  -TA     Posttreatment Pain Rating  0/10 - no pain  -TA     Row Name 21 1503          Bed Mobility    Scooting/Bridging [place] (Bed Mobility)  modified independence  -TA     Supine-Sit [place] (Bed Mobility)  modified independence  -TA     Sit-Supine [place] (Bed Mobility)  modified independence  -TA     Row Name 21 1503          Transfers    Transfers  sit-stand transfer;stand-sit transfer  -TA     Sit-Stand [place] (Transfers)  standby assist  -TA     Stand-Sit [place] (Transfers)  standby assist  -TA     Row Name 21 1503          Sit-Stand Transfer    Assistive Device (Sit-Stand Transfers)  other (see comments) no AD  -TA     Row Name 21 1503          Stand-Sit Transfer    Assistive Device (Stand-Sit Transfers)  other (see comments) no AD   -TA     Row Name 21 1503          Gait/Stairs (Locomotion)     [place] Level (Gait)  contact guard  -TA     Assistive Device (Gait)  other (see comments) no AD  -TA     Distance in Feet (Gait)  90`  -TA     Pattern (Gait)  step-through  -TA     Deviations/Abnormal Patterns (Gait)  stride length decreased  -TA     Row Name 02/11/21 1503          Safety Issues, Functional Mobility    Impairments Affecting Function (Mobility)  endurance/activity tolerance;shortness of breath  -TA     Row Name 02/11/21 1503          Ankle (Therapeutic Exercise)    Ankle (Therapeutic Exercise)  AROM (active range of motion)  -TA     Ankle AROM (Therapeutic Exercise)  bilateral;dorsiflexion;plantarflexion;10 repetitions  -TA     Row Name 02/11/21 1503          Plan of Care Review    Plan of Care Reviewed With  patient  -TA     Outcome Summary  pt sup<>sit with independence, sit<>stand with SBA, pt  ambulated without AD 90` with SBA, pt's O2 stats decreased to 87% with gait, pt instructed with PLB. pt would benefit from home with assist & HHPT @ D/C  -TA     Row Name 02/11/21 150          Vital Signs    Pretreatment Heart Rate (beats/min)  69  -TA     Intratreatment Heart Rate (beats/min)  72  -TA     Posttreatment Heart Rate (beats/min)  70  -TA     Pre SpO2 (%)  99  -TA     O2 Delivery Pre Treatment  hi-flow  -TA     Intra SpO2 (%)  87  -TA     O2 Delivery Intra Treatment  hi-flow  -TA     Post SpO2 (%)  94  -TA     O2 Delivery Post Treatment  hi-flow  -TA     Pre Patient Position  Supine  -TA     Post Patient Position  Supine  -TA     Row Name 02/11/21 1507          Transfer Goal 1 (PT)    Activity/Assistive Device (Transfer Goal 1, PT)  sit-to-stand/stand-to-sit;bed-to-chair/chair-to-bed  -TA     [place] Level/Cues Needed (Transfer Goal 1, PT)  standby assist;independent O2 sats will not drop below 92%  -TA     Time Frame (Transfer Goal 1, PT)  by discharge  -TA     Progress/Outcome (Transfer Goal 1, PT)  goal partially met  -TA     Row Name 02/11/21 1503          Gait Training Goal 1  (PT)    Activity/Assistive Device (Gait Training Goal 1, PT)  gait (walking locomotion)  -TA     [place] Level (Gait Training Goal 1, PT)  contact guard assist;standby assist  -TA     Distance (Gait Training Goal 1, PT)  30ft or more x3 O2 sats will not drop below 92%  -TA     Time Frame (Gait Training Goal 1, PT)  by discharge  -TA     Progress/Outcome (Gait Training Goal 1, PT)  goal not met  -TA     Row Name 02/11/21 1503          ROM Goal 1 (PT)    ROM Goal 1 (PT)  Pt will tolerate LE exercises OOB in chair with VSS  -TA     Time Frame (ROM Goal 1, PT)  by discharge  -TA     Progress/Outcome (ROM Goal 1, PT)  goal partially met  -TA     Row Name 02/11/21 1503          Positioning and Restraints    Pre-Treatment Position  in bed  -TA     Post Treatment Position  bed  -TA     In Bed  supine;call light within reach;exit alarm on  -TA     Row Name 02/11/21 1503          Therapy Assessment/Plan (PT)    Rehab Potential (PT)  good, to achieve stated therapy goals  -TA     Criteria for Skilled Interventions Met (PT)  yes;skilled treatment is necessary;meets criteria  -TA     Comment, Therapy Assessment/Plan (PT)  continue  -TA       User Key  (r) = Recorded By, (t) = Taken By, (c) = Cosigned By    Initials Name Provider Type    Brandie Ozuna PTA Physical Therapy Assistant        Physical Therapy Education                 Title: PT OT SLP Therapies (In Progress)     Topic: Physical Therapy (In Progress)     Point: Mobility training (In Progress)     Learning Progress Summary           Patient Acceptance, E, NR by STEPHANIE at 2/8/2021 1653                   Point: Home exercise program (Not Started)     Learner Progress:  Not documented in this visit.          Point: Body mechanics (In Progress)     Learning Progress Summary           Patient Acceptance, E, NR by STEPHANIE at 2/8/2021 1653                   Point: Precautions (In Progress)     Learning Progress Summary           Patient Acceptance, E, NR by STEPHANIE at 2/8/2021  [id number]                               User Key     Initials Effective Dates Name Provider Type Discipline     04/03/18 -  Silvia Gonzalez, PT Physical Therapist PT              PT Recommendation and Plan  Anticipated Discharge Disposition (PT): home with assist, home with home health  Therapy Frequency (PT): other (see comments)(5-7 days /wk)  Plan of Care Reviewed With: patient  Outcome Summary: pt sup<>sit with independence, sit<>stand with SBA, pt  ambulated without AD 90` with SBA, pt's O2 stats decreased to 87% with gait, pt instructed with PLB. pt would benefit from home with assist & HHPT @ D/C  Outcome Measures     Row Name 02/11/21 1600             How much help from another person do you currently need...    Turning from your back to your side while in flat bed without using bedrails?  4  -TA      Moving from lying on back to sitting on the side of a flat bed without bedrails?  4  -TA      Moving to and from a bed to a chair (including a wheelchair)?  3  -TA      Standing up from a chair using your arms (e.g., wheelchair, bedside chair)?  3  -TA      Climbing 3-5 steps with a railing?  3  -TA      To walk in hospital room?  3  -TA      AM-PAC 6 Clicks Score (PT)  20  -TA         Functional Assessment    Outcome Measure Options  AM-PAC 6 Clicks Basic Mobility (PT)  -TA        User Key  (r) = Recorded By, (t) = Taken By, (c) = Cosigned By    Initials Name Provider Type    Brandie Ozuna PTA Physical Therapy Assistant           Time Calculation:   PT Charges     Row Name 02/11/21 1611             Time Calculation    Start Time  1505  -TA      Stop Time  1529  -TA      Time Calculation (min)  24 min  -TA      PT Received On  02/11/21  -TA         Time Calculation- PT    Total Timed Code Minutes- PT  24 minute(s)  -TA        User Key  (r) = Recorded By, (t) = Taken By, (c) = Cosigned By    Initials Name Provider Type    Brandie Ozuna PTA Physical Therapy Assistant        Therapy Charges for Today      Code Description Service Date Service Provider Modifiers Qty    20024145634 HC GAIT TRAINING EA 15 MIN 2/11/2021 Brandie Recinos, PTA GP 1    02532003030 HC PT SELF CARE/MGMT/TRAIN EA 15 MIN 2/11/2021 Brandie Recinos, TEENA GP 1          PT G-Codes  Outcome Measure Options: AM-PAC 6 Clicks Basic Mobility (PT)  AM-PAC 6 Clicks Score (PT): 20  AM-PAC 6 Clicks Score (OT): 20    Brandie Recinos PTA  2/11/2021

## 2021-02-11 NOTE — PROGRESS NOTES
Progress Note  Oral Anguiano MD  Hospitalist    Date of visit: 2/11/2021     LOS: 8 days   Patient Care Team:  Clifford Weston MD as PCP - General    Chief Complaint: dyspnea    Subjective     Interval History:     Patient Complaints: dyspnea / improved to some extent. Continues to require high flow supplemental Oxygen at 60 L/min to maintain an Oxygen saturation of 94%    History taken from: patient    Medication Review:   Current Facility-Administered Medications   Medication Dose Route Frequency Provider Last Rate Last Admin   • acetaminophen (TYLENOL) tablet 500 mg  500 mg Oral Q8H PRN Prashant Luna MD       • albuterol sulfate HFA (PROVENTIL HFA;VENTOLIN HFA;PROAIR HFA) inhaler 2 puff  2 puff Inhalation 4x Daily - RT Alfie Willis MD   2 puff at 02/11/21 0841   • ascorbic acid (VITAMIN C) tablet 1,000 mg  1,000 mg Oral Daily Prashant Luna MD   1,000 mg at 02/11/21 0847   • aspirin EC tablet 81 mg  81 mg Oral Daily Prashant Luna MD   81 mg at 02/11/21 0847   • atorvastatin (LIPITOR) tablet 40 mg  40 mg Oral Daily Prashant Luna MD   40 mg at 02/11/21 0847   • baricitinib (OLUMIANT) tablet 4 mg  4 mg Oral Daily Rodrigo Finney MD   4 mg at 02/11/21 0919   • budesonide-formoterol (SYMBICORT) 160-4.5 MCG/ACT inhaler 2 puff  2 puff Inhalation BID - RT Alfie Willis MD   2 puff at 02/11/21 0840   • cetirizine (zyrTEC) tablet 10 mg  10 mg Oral Daily Prashant Luna MD   10 mg at 02/11/21 0847   • dexamethasone (DECADRON) injection 6 mg  6 mg Intravenous BID Alfie Willis MD   6 mg at 02/11/21 0847   • dextrose (D50W) 25 g/ 50mL Intravenous Solution 25 g  25 g Intravenous Q15 Min PRN Prashant Luna MD       • dextrose (GLUTOSE) oral gel 15 g  15 g Oral Q15 Min PRN Prashant Luna MD       • enoxaparin (LOVENOX) syringe 40 mg  40 mg Subcutaneous Nightly Prashant Luna MD   40 mg at 02/10/21 2055   • glucagon (human recombinant) (GLUCAGEN DIAGNOSTIC) injection 1 mg  1 mg  Subcutaneous Q15 Min PRN Prashant Luna MD       • insulin aspart (novoLOG) injection 0-14 Units  0-14 Units Subcutaneous TID AC Alfie Willis MD   8 Units at 02/11/21 0847   • insulin detemir (LEVEMIR) injection 20 Units  20 Units Subcutaneous Nightly Oral Anguiano MD   20 Units at 02/10/21 2055   • ipratropium (ATROVENT HFA) inhaler 2 puff  2 puff Inhalation 4x Daily - RT Alfie Willis MD   2 puff at 02/11/21 0840   • levothyroxine (SYNTHROID, LEVOTHROID) tablet 125 mcg  125 mcg Oral Q AM Prashant Luna MD   125 mcg at 02/11/21 0604   • multivitamin (THERAGRAN) tablet 1 tablet  1 tablet Oral Daily Prashant Luna MD   1 tablet at 02/11/21 0847   • nystatin (MYCOSTATIN) 978244 UNIT/ML suspension 500,000 Units  5 mL Swish & Swallow 4x Daily Oral Anguiano MD   500,000 Units at 02/11/21 0849   • pantoprazole (PROTONIX) EC tablet 40 mg  40 mg Oral QAM Prashant Luna MD   40 mg at 02/11/21 0604   • Pharmacy Consult - Pharmacy to dose   Does not apply Continuous PRN Rodrigo Finney MD       • Pharmacy Consult - Remdesivir   Does not apply Continuous PRN Prashant Luna MD       • remdesivir 100 mg in sodium chloride 0.9 % 250 mL IVPB (powder vial)  100 mg Intravenous Q24H Rodrigo Finney MD   100 mg at 02/10/21 2055   • sodium chloride 0.9 % flush 10 mL  10 mL Intravenous PRN Prashant Luna MD   10 mL at 02/11/21 0849   • sodium chloride 0.9 % infusion  50 mL/hr Intravenous Continuous Prashant Luna MD 50 mL/hr at 02/05/21 2218 50 mL/hr at 02/05/21 2218   • tamsulosin (FLOMAX) 24 hr capsule 0.4 mg  0.4 mg Oral Daily Prashant Luna MD   0.4 mg at 02/11/21 0847       Review of Systems:   Review of Systems   Constitutional: Positive for fatigue. Negative for fever.   Respiratory: Positive for cough and shortness of breath. Negative for wheezing.    Cardiovascular: Negative for chest pain, palpitations and leg swelling.   Gastrointestinal: Negative for abdominal distention, abdominal  pain, blood in stool, diarrhea, nausea and vomiting.   Genitourinary: Negative for dysuria, frequency, genital sores, hematuria and urgency.   Musculoskeletal: Negative for arthralgias, back pain and gait problem.   Skin: Positive for pallor. Negative for color change and rash.   Neurological: Positive for weakness. Negative for seizures, speech difficulty and headaches.   Psychiatric/Behavioral: Negative for agitation, behavioral problems and confusion.   All other systems reviewed and are negative.      Objective     Vital Signs  Temp:  [96.7 °F (35.9 °C)-97.9 °F (36.6 °C)] 97.3 °F (36.3 °C)  Heart Rate:  [51-74] 67  Resp:  [18-20] 18  BP: (112-144)/(65-76) 112/65    Physical Exam:  Physical Exam  Vitals signs reviewed.   Constitutional:       General: He is not in acute distress.     Appearance: He is ill-appearing.   HENT:      Head: Normocephalic and atraumatic.   Eyes:      General: No scleral icterus.     Extraocular Movements: Extraocular movements intact.      Pupils: Pupils are equal, round, and reactive to light.   Neck:      Musculoskeletal: Normal range of motion and neck supple. No neck rigidity or muscular tenderness.   Cardiovascular:      Rate and Rhythm: Normal rate and regular rhythm.   Pulmonary:      Effort: No respiratory distress.      Breath sounds: No stridor. Rales present. No rhonchi.   Abdominal:      General: There is no distension.      Palpations: There is no mass.      Tenderness: There is no abdominal tenderness.   Musculoskeletal: Normal range of motion.         General: No swelling, tenderness or deformity.   Skin:     General: Skin is warm and dry.      Coloration: Skin is pale. Skin is not jaundiced.      Findings: No bruising.   Neurological:      General: No focal deficit present.      Mental Status: He is alert and oriented to person, place, and time.      Cranial Nerves: No cranial nerve deficit.      Sensory: No sensory deficit.   Psychiatric:         Mood and Affect: Mood  normal.         Behavior: Behavior normal.          Results Review:    Lab Results (last 24 hours)     Procedure Component Value Units Date/Time    POC Glucose Once [[id number]]  (Abnormal) Collected: 02/11/21 0659    Specimen: Blood Updated: 02/11/21 0719     Glucose 259 mg/dL      Comment: RN NotifiedOperator: [id number] BOB REBAMeter ID: [id number]       Comprehensive Metabolic Panel [[id number]]  (Abnormal) Collected: 02/11/21 0614    Specimen: Blood Updated: 02/11/21 0708     Glucose 250 mg/dL      BUN 21 mg/dL      Creatinine 0.74 mg/dL      Sodium 135 mmol/L      Potassium 4.7 mmol/L      Chloride 98 mmol/L      CO2 28.0 mmol/L      Calcium 8.6 mg/dL      Total Protein 6.9 g/dL      Albumin 3.30 g/dL      ALT (SGPT) 34 U/L      AST (SGOT) 23 U/L      Alkaline Phosphatase 70 U/L      Total Bilirubin 0.6 mg/dL      eGFR Non African Amer 103 mL/min/1.73      Globulin 3.6 gm/dL      A/G Ratio 0.9 g/dL      BUN/Creatinine Ratio 28.4     Anion Gap 9.0 mmol/L     Narrative:      GFR Normal >60  Chronic Kidney Disease <60  Kidney Failure <15      C-reactive Protein [[id number]]  (Abnormal) Collected: 02/11/21 0614    Specimen: Blood Updated: 02/11/21 0708     C-Reactive Protein 0.70 mg/dL     CBC & Differential [[id number]]  (Abnormal) Collected: 02/11/21 0614    Specimen: Blood Updated: 02/11/21 0648    Narrative:      The following orders were created for panel order CBC & Differential.  Procedure                               Abnormality         Status                     ---------                               -----------         ------                     CBC Auto Differential[[id number]]        Abnormal            Final result                 Please view results for these tests on the individual orders.    CBC Auto Differential [[id number]]  (Abnormal) Collected: 02/11/21 0614    Specimen: Blood Updated: 02/11/21 0648     WBC 10.82 10*3/mm3      RBC 4.81 10*6/mm3      Hemoglobin 15.4 g/dL      Hematocrit 43.2 %       MCV 89.8 fL      MCH 32.0 pg      MCHC 35.6 g/dL      RDW 12.1 %      RDW-SD 39.8 fl      MPV 9.4 fL      Platelets 529 10*3/mm3      Neutrophil % 80.9 %      Lymphocyte % 7.6 %      Monocyte % 8.4 %      Eosinophil % 0.0 %      Basophil % 0.6 %      Immature Grans % 2.5 %      Neutrophils, Absolute 8.76 10*3/mm3      Lymphocytes, Absolute 0.82 10*3/mm3      Monocytes, Absolute 0.91 10*3/mm3      Eosinophils, Absolute 0.00 10*3/mm3      Basophils, Absolute 0.06 10*3/mm3      Immature Grans, Absolute 0.27 10*3/mm3      nRBC 0.0 /100 WBC     POC Glucose Once [[id number]]  (Abnormal) Collected: 02/10/21 1948    Specimen: Blood Updated: 02/10/21 2001     Glucose 361 mg/dL      Comment: Result Not ConfirmedOperator: [id number] BOB REBAMeter ID: [id number]       POC Glucose Once [[id number]]  (Abnormal) Collected: 02/10/21 1645    Specimen: Blood Updated: 02/10/21 1719     Glucose 281 mg/dL      Comment: RN NotifiedOperator: [id number] ROS KAYELAMeter ID: [id number]       POC Glucose Once [[id number]]  (Abnormal) Collected: 02/10/21 1045    Specimen: Blood Updated: 02/10/21 1112     Glucose 281 mg/dL      Comment: Result Not ConfirmedOperator: [id number] MARGARITO ANGELITAMeter ID: [id number]             Imaging Results (Last 24 Hours)     ** No results found for the last 24 hours. **          Assessment/Plan       Acute respiratory failure (CMS/HCC)    Pneumonia due to COVID-19 virus    Diabetes mellitus (CMS/HCC)    Continue with supplemental Oxygen, IV Decadron / Remdesivir, Olumiant, supportive care. Increase the dose of Levemir for a better diabetic control. No changes noted on the recent CXRay.    Obtain Case Management consult for possible LTAC consult.    Oral Anguiano MD  02/11/21  09:42 CST

## 2021-02-12 ENCOUNTER — HOSPITAL ENCOUNTER (OUTPATIENT)
Facility: HOSPITAL | Age: 76
Discharge: HOME | End: 2021-03-03
Attending: INTERNAL MEDICINE | Admitting: INTERNAL MEDICINE

## 2021-02-12 VITALS
OXYGEN SATURATION: 96 % | BODY MASS INDEX: 24.7 KG/M2 | SYSTOLIC BLOOD PRESSURE: 131 MMHG | WEIGHT: 186.4 LBS | HEIGHT: 73 IN | RESPIRATION RATE: 20 BRPM | HEART RATE: 65 BPM | TEMPERATURE: 97.8 F | DIASTOLIC BLOOD PRESSURE: 75 MMHG

## 2021-02-12 DIAGNOSIS — Z74.09 IMPAIRED FUNCTIONAL MOBILITY, BALANCE, GAIT, AND ENDURANCE: ICD-10-CM

## 2021-02-12 DIAGNOSIS — Z74.09 IMPAIRED MOBILITY AND ACTIVITIES OF DAILY LIVING: ICD-10-CM

## 2021-02-12 DIAGNOSIS — Z78.9 IMPAIRED MOBILITY AND ACTIVITIES OF DAILY LIVING: ICD-10-CM

## 2021-02-12 LAB
ALBUMIN SERPL-MCNC: 3.1 G/DL (ref 3.5–5.2)
ALBUMIN/GLOB SERPL: 0.9 G/DL
ALP SERPL-CCNC: 67 U/L (ref 39–117)
ALT SERPL W P-5'-P-CCNC: 35 U/L (ref 1–41)
ANION GAP SERPL CALCULATED.3IONS-SCNC: 7 MMOL/L (ref 5–15)
AST SERPL-CCNC: 25 U/L (ref 1–40)
BASOPHILS # BLD AUTO: 0.05 10*3/MM3 (ref 0–0.2)
BASOPHILS NFR BLD AUTO: 0.4 % (ref 0–1.5)
BILIRUB SERPL-MCNC: 0.7 MG/DL (ref 0–1.2)
BUN SERPL-MCNC: 23 MG/DL (ref 8–23)
BUN/CREAT SERPL: 27.4 (ref 7–25)
CALCIUM SPEC-SCNC: 9.2 MG/DL (ref 8.6–10.5)
CHLORIDE SERPL-SCNC: 97 MMOL/L (ref 98–107)
CO2 SERPL-SCNC: 29 MMOL/L (ref 22–29)
CREAT SERPL-MCNC: 0.84 MG/DL (ref 0.76–1.27)
CRP SERPL-MCNC: 0.46 MG/DL (ref 0–0.5)
DEPRECATED RDW RBC AUTO: 40 FL (ref 37–54)
EOSINOPHIL # BLD AUTO: 0 10*3/MM3 (ref 0–0.4)
EOSINOPHIL NFR BLD AUTO: 0 % (ref 0.3–6.2)
ERYTHROCYTE [DISTWIDTH] IN BLOOD BY AUTOMATED COUNT: 12.1 % (ref 12.3–15.4)
GFR SERPL CREATININE-BSD FRML MDRD: 89 ML/MIN/1.73
GLOBULIN UR ELPH-MCNC: 3.4 GM/DL
GLUCOSE BLDC GLUCOMTR-MCNC: 258 MG/DL (ref 70–130)
GLUCOSE BLDC GLUCOMTR-MCNC: 286 MG/DL (ref 70–130)
GLUCOSE BLDC GLUCOMTR-MCNC: 326 MG/DL (ref 70–130)
GLUCOSE SERPL-MCNC: 301 MG/DL (ref 65–99)
HCT VFR BLD AUTO: 43.4 % (ref 37.5–51)
HGB BLD-MCNC: 15.4 G/DL (ref 13–17.7)
IMM GRANULOCYTES # BLD AUTO: 0.23 10*3/MM3 (ref 0–0.05)
IMM GRANULOCYTES NFR BLD AUTO: 1.8 % (ref 0–0.5)
LYMPHOCYTES # BLD AUTO: 0.73 10*3/MM3 (ref 0.7–3.1)
LYMPHOCYTES NFR BLD AUTO: 5.7 % (ref 19.6–45.3)
MCH RBC QN AUTO: 32.2 PG (ref 26.6–33)
MCHC RBC AUTO-ENTMCNC: 35.5 G/DL (ref 31.5–35.7)
MCV RBC AUTO: 90.6 FL (ref 79–97)
MONOCYTES # BLD AUTO: 1.04 10*3/MM3 (ref 0.1–0.9)
MONOCYTES NFR BLD AUTO: 8.1 % (ref 5–12)
NEUTROPHILS NFR BLD AUTO: 10.85 10*3/MM3 (ref 1.7–7)
NEUTROPHILS NFR BLD AUTO: 84 % (ref 42.7–76)
NRBC BLD AUTO-RTO: 0 /100 WBC (ref 0–0.2)
PLATELET # BLD AUTO: 539 10*3/MM3 (ref 140–450)
PMV BLD AUTO: 9.3 FL (ref 6–12)
POTASSIUM SERPL-SCNC: 4.7 MMOL/L (ref 3.5–5.2)
PROT SERPL-MCNC: 6.5 G/DL (ref 6–8.5)
RBC # BLD AUTO: 4.79 10*6/MM3 (ref 4.14–5.8)
SODIUM SERPL-SCNC: 133 MMOL/L (ref 136–145)
WBC # BLD AUTO: 12.9 10*3/MM3 (ref 3.4–10.8)

## 2021-02-12 PROCEDURE — 25010000002 DEXAMETHASONE PER 1 MG: Performed by: FAMILY MEDICINE

## 2021-02-12 PROCEDURE — 63710000001 INSULIN DETEMIR PER 5 UNITS: Performed by: NURSE PRACTITIONER

## 2021-02-12 PROCEDURE — 25010000002 ENOXAPARIN PER 10 MG: Performed by: NURSE PRACTITIONER

## 2021-02-12 PROCEDURE — 97116 GAIT TRAINING THERAPY: CPT

## 2021-02-12 PROCEDURE — 80053 COMPREHEN METABOLIC PANEL: CPT | Performed by: HOSPITALIST

## 2021-02-12 PROCEDURE — 82962 GLUCOSE BLOOD TEST: CPT

## 2021-02-12 PROCEDURE — 85025 COMPLETE CBC W/AUTO DIFF WBC: CPT | Performed by: HOSPITALIST

## 2021-02-12 PROCEDURE — 63710000001 INSULIN ASPART PER 5 UNITS: Performed by: FAMILY MEDICINE

## 2021-02-12 PROCEDURE — 97535 SELF CARE MNGMENT TRAINING: CPT

## 2021-02-12 PROCEDURE — 94799 UNLISTED PULMONARY SVC/PX: CPT

## 2021-02-12 PROCEDURE — 97110 THERAPEUTIC EXERCISES: CPT

## 2021-02-12 PROCEDURE — 86140 C-REACTIVE PROTEIN: CPT | Performed by: HOSPITALIST

## 2021-02-12 PROCEDURE — 25010000002 DEXAMETHASONE PER 1 MG: Performed by: NURSE PRACTITIONER

## 2021-02-12 RX ORDER — DEXTROSE MONOHYDRATE 25 G/50ML
50 INJECTION, SOLUTION INTRAVENOUS
Status: DISCONTINUED | OUTPATIENT
Start: 2021-02-12 | End: 2021-03-03 | Stop reason: HOSPADM

## 2021-02-12 RX ORDER — CETIRIZINE HYDROCHLORIDE 10 MG/1
10 TABLET ORAL DAILY
Status: DISCONTINUED | OUTPATIENT
Start: 2021-02-13 | End: 2021-03-03 | Stop reason: HOSPADM

## 2021-02-12 RX ORDER — ALBUTEROL SULFATE 90 UG/1
2 AEROSOL, METERED RESPIRATORY (INHALATION)
Start: 2021-02-12 | End: 2021-03-22 | Stop reason: ALTCHOICE

## 2021-02-12 RX ORDER — ALBUTEROL SULFATE 90 UG/1
2 AEROSOL, METERED RESPIRATORY (INHALATION)
Status: DISCONTINUED | OUTPATIENT
Start: 2021-02-12 | End: 2021-02-13

## 2021-02-12 RX ORDER — SODIUM CHLORIDE 0.9 % (FLUSH) 0.9 %
3 SYRINGE (ML) INJECTION EVERY 12 HOURS SCHEDULED
Status: DISCONTINUED | OUTPATIENT
Start: 2021-02-12 | End: 2021-03-03 | Stop reason: HOSPADM

## 2021-02-12 RX ORDER — BUDESONIDE AND FORMOTEROL FUMARATE DIHYDRATE 160; 4.5 UG/1; UG/1
2 AEROSOL RESPIRATORY (INHALATION)
Start: 2021-02-12 | End: 2021-03-22 | Stop reason: ALTCHOICE

## 2021-02-12 RX ORDER — NICOTINE POLACRILEX 4 MG
15 LOZENGE BUCCAL
Status: DISCONTINUED | OUTPATIENT
Start: 2021-02-12 | End: 2021-03-03 | Stop reason: HOSPADM

## 2021-02-12 RX ORDER — BUDESONIDE AND FORMOTEROL FUMARATE DIHYDRATE 160; 4.5 UG/1; UG/1
2 AEROSOL RESPIRATORY (INHALATION)
Status: DISCONTINUED | OUTPATIENT
Start: 2021-02-12 | End: 2021-02-13

## 2021-02-12 RX ORDER — DIPHENOXYLATE HYDROCHLORIDE AND ATROPINE SULFATE 2.5; .025 MG/1; MG/1
1 TABLET ORAL DAILY
Qty: 30 TABLET
Start: 2021-02-13

## 2021-02-12 RX ORDER — L. ACIDOPHILUS/L.BULGARICUS 1MM CELL
1 TABLET ORAL 2 TIMES DAILY PRN
Status: DISCONTINUED | OUTPATIENT
Start: 2021-02-12 | End: 2021-03-03 | Stop reason: HOSPADM

## 2021-02-12 RX ORDER — TAMSULOSIN HYDROCHLORIDE 0.4 MG/1
0.4 CAPSULE ORAL DAILY
Start: 2021-02-13 | End: 2021-03-29

## 2021-02-12 RX ORDER — DEXAMETHASONE SODIUM PHOSPHATE 4 MG/ML
6 INJECTION, SOLUTION INTRA-ARTICULAR; INTRALESIONAL; INTRAMUSCULAR; INTRAVENOUS; SOFT TISSUE 2 TIMES DAILY
Start: 2021-02-12 | End: 2021-03-22 | Stop reason: ALTCHOICE

## 2021-02-12 RX ORDER — ASCORBIC ACID 500 MG
1000 TABLET ORAL DAILY
Status: DISCONTINUED | OUTPATIENT
Start: 2021-02-13 | End: 2021-03-03 | Stop reason: HOSPADM

## 2021-02-12 RX ORDER — ASPIRIN 81 MG/1
81 TABLET ORAL DAILY
Status: DISCONTINUED | OUTPATIENT
Start: 2021-02-12 | End: 2021-03-03 | Stop reason: HOSPADM

## 2021-02-12 RX ORDER — PANTOPRAZOLE SODIUM 40 MG/1
40 TABLET, DELAYED RELEASE ORAL EVERY MORNING
Start: 2021-02-13 | End: 2021-03-22 | Stop reason: ALTCHOICE

## 2021-02-12 RX ORDER — DEXAMETHASONE SODIUM PHOSPHATE 4 MG/ML
6 INJECTION, SOLUTION INTRA-ARTICULAR; INTRALESIONAL; INTRAMUSCULAR; INTRAVENOUS; SOFT TISSUE 2 TIMES DAILY
Status: DISCONTINUED | OUTPATIENT
Start: 2021-02-12 | End: 2021-02-16

## 2021-02-12 RX ORDER — NICOTINE POLACRILEX 4 MG
15 LOZENGE BUCCAL
Status: ON HOLD
Start: 2021-02-12 | End: 2021-04-01

## 2021-02-12 RX ORDER — ASPIRIN 81 MG/1
81 TABLET ORAL DAILY
Start: 2021-02-13

## 2021-02-12 RX ORDER — ACETAMINOPHEN 500 MG
500 TABLET ORAL EVERY 8 HOURS PRN
Start: 2021-02-12 | End: 2021-03-29

## 2021-02-12 RX ORDER — DEXTROSE MONOHYDRATE 25 G/50ML
25 INJECTION, SOLUTION INTRAVENOUS
Start: 2021-02-12 | End: 2021-03-22 | Stop reason: ALTCHOICE

## 2021-02-12 RX ORDER — ATORVASTATIN CALCIUM 40 MG/1
40 TABLET, FILM COATED ORAL DAILY
Status: DISCONTINUED | OUTPATIENT
Start: 2021-02-13 | End: 2021-03-03 | Stop reason: HOSPADM

## 2021-02-12 RX ORDER — ACETAMINOPHEN 500 MG
500 TABLET ORAL EVERY 8 HOURS PRN
Status: DISCONTINUED | OUTPATIENT
Start: 2021-02-12 | End: 2021-03-03 | Stop reason: HOSPADM

## 2021-02-12 RX ADMIN — IPRATROPIUM BROMIDE 2 PUFF: 17 AEROSOL, METERED RESPIRATORY (INHALATION) at 11:41

## 2021-02-12 RX ADMIN — ALBUTEROL SULFATE 2 PUFF: 90 AEROSOL, METERED RESPIRATORY (INHALATION) at 11:41

## 2021-02-12 RX ADMIN — DEXAMETHASONE SODIUM PHOSPHATE 6 MG: 4 INJECTION, SOLUTION INTRA-ARTICULAR; INTRALESIONAL; INTRAMUSCULAR; INTRAVENOUS; SOFT TISSUE at 08:51

## 2021-02-12 RX ADMIN — NYSTATIN 500000 UNITS: 500000 SUSPENSION ORAL at 08:50

## 2021-02-12 RX ADMIN — INSULIN ASPART 10 UNITS: 100 INJECTION, SOLUTION INTRAVENOUS; SUBCUTANEOUS at 08:51

## 2021-02-12 RX ADMIN — BARICITINIB 4 MG: 2 TABLET, FILM COATED ORAL at 10:55

## 2021-02-12 RX ADMIN — THERA TABS 1 TABLET: TAB at 08:50

## 2021-02-12 RX ADMIN — BUDESONIDE AND FORMOTEROL FUMARATE DIHYDRATE 2 PUFF: 160; 4.5 AEROSOL RESPIRATORY (INHALATION) at 08:09

## 2021-02-12 RX ADMIN — ATORVASTATIN CALCIUM 40 MG: 40 TABLET, FILM COATED ORAL at 08:50

## 2021-02-12 RX ADMIN — INSULIN ASPART 10 UNITS: 100 INJECTION, SOLUTION INTRAVENOUS; SUBCUTANEOUS at 11:17

## 2021-02-12 RX ADMIN — LEVOTHYROXINE SODIUM 125 MCG: 125 TABLET ORAL at 06:00

## 2021-02-12 RX ADMIN — CETIRIZINE HYDROCHLORIDE 10 MG: 10 TABLET, FILM COATED ORAL at 08:51

## 2021-02-12 RX ADMIN — PANTOPRAZOLE SODIUM 40 MG: 40 TABLET, DELAYED RELEASE ORAL at 06:21

## 2021-02-12 RX ADMIN — TAMSULOSIN HYDROCHLORIDE 0.4 MG: 0.4 CAPSULE ORAL at 08:50

## 2021-02-12 RX ADMIN — OXYCODONE HYDROCHLORIDE AND ACETAMINOPHEN 1000 MG: 500 TABLET ORAL at 08:50

## 2021-02-12 RX ADMIN — IPRATROPIUM BROMIDE 2 PUFF: 17 AEROSOL, METERED RESPIRATORY (INHALATION) at 08:09

## 2021-02-12 RX ADMIN — ALBUTEROL SULFATE 2 PUFF: 90 AEROSOL, METERED RESPIRATORY (INHALATION) at 08:10

## 2021-02-12 NOTE — DISCHARGE SUMMARY
Discharge Summary  Oral Anguiano MD  Hospitalist     Date of Discharge:  2/12/2021    Discharge Diagnosis:      Acute respiratory failure (CMS/HCC)    Pneumonia due to COVID-19 virus    Diabetes mellitus (CMS/HCC)      Presenting Problem/History of Present Illness  Dyspnea     Hospital Course  Patient is a 76 y.o. male admitted for worsening dyspnea, cough accompanied by hypoxia due to Covid 19 pneumonia. He improved with the help of symptomatic care, IV Decadron, IV Remdesivir, supplemental Oxygen to the point where he can maintain a saturation of 96% on 40 L/min high flow cannula.    He will be transferred to [address] [place] [address] for continuation of treatment and further PT/OT with the hope of returning home.     Consults:   Consults     Date and Time Order Name Status Description    2/3/2021 1346 Hospitalist (on-call MD unless specified)          Condition on Discharge:  stable    Vital Signs  Temp:  [96.3 °F (35.7 °C)-98.1 °F (36.7 °C)] 97.8 °F (36.6 °C)  Heart Rate:  [58-74] 65  Resp:  [18-22] 20  BP: (109-138)/(58-75) 131/75    Physical Exam:  Physical Exam  Vitals signs reviewed.   Constitutional:       General: He is not in acute distress.     Appearance: He is ill-appearing.   HENT:      Head: Normocephalic and atraumatic.   Eyes:      General: No scleral icterus.     Extraocular Movements: Extraocular movements intact.      Pupils: Pupils are equal, round, and reactive to light.   Neck:      Musculoskeletal: Normal range of motion and neck supple. No neck rigidity or muscular tenderness.   Cardiovascular:      Rate and Rhythm: Normal rate and regular rhythm.   Pulmonary:      Effort: No respiratory distress.      Breath sounds: Normal breath sounds. No wheezing, rhonchi or rales.   Abdominal:      General: Bowel sounds are normal. There is no distension.      Palpations: Abdomen is soft. There is no mass.      Tenderness: There is no abdominal tenderness. There is no right CVA tenderness, left CVA tenderness or  guarding.   Musculoskeletal: Normal range of motion.         General: No swelling, tenderness or deformity.      Right lower leg: No edema.      Left lower leg: No edema.   Skin:     General: Skin is dry.      Coloration: Skin is not jaundiced or pale.      Findings: No bruising or lesion.   Neurological:      General: No focal deficit present.      Mental Status: He is alert and oriented to person, place, and time.      Cranial Nerves: No cranial nerve deficit.   Psychiatric:         Mood and Affect: Mood normal.         Behavior: Behavior normal.         Discharge Disposition  Long Term Care (DC - External)    Discharge Medications     Discharge Medications      New Medications      Instructions Start Date   albuterol sulfate  (90 Base) MCG/ACT inhaler  Commonly known as: PROVENTIL HFA;VENTOLIN HFA;PROAIR HFA   2 puffs, Inhalation, 4 Times Daily - RT      ascorbic acid 1000 MG tablet  Commonly known as: VITAMIN C  Replaces: Vitamin C 500 MG capsule   1,000 mg, Oral, Daily   Start Date: February 13, 2021     aspirin 81 MG EC tablet  Replaces: aspirin 81 MG tablet   81 mg, Oral, Daily   Start Date: February 13, 2021     baricitinib 2 MG tablet tablet  Commonly known as: OLUMIANT   4 mg, Oral, Daily   Start Date: February 13, 2021     budesonide-formoterol 160-4.5 MCG/ACT inhaler  Commonly known as: SYMBICORT   2 puffs, Inhalation, 2 Times Daily - RT      dexamethasone 4 MG/ML injection  Commonly known as: DECADRON   6 mg, Intravenous, 2 Times Daily      dextrose 40 % gel  Commonly known as: GLUTOSE   15 g, Oral, Every 15 Minutes PRN      dextrose 50 % solution  Commonly known as: D50W   25 g, Intravenous, Every 15 Minutes PRN      enoxaparin 40 MG/0.4ML solution syringe  Commonly known as: LOVENOX   40 mg, Subcutaneous, Nightly      glucagon (human recombinant) 1 MG injection  Commonly known as: GLUCAGEN DIAGNOSTIC   1 mg, Subcutaneous, Every 15 Minutes PRN      insulin aspart 100 UNIT/ML injection  Commonly  known as: novoLOG   0-14 Units, Subcutaneous, 3 Times Daily Before Meals      insulin detemir 100 UNIT/ML injection  Commonly known as: LEVEMIR   25 Units, Subcutaneous, Nightly      ipratropium 17 MCG/ACT inhaler  Commonly known as: ATROVENT HFA   2 puffs, Inhalation, 4 Times Daily - RT      nystatin 806225 UNIT/ML suspension  Commonly known as: MYCOSTATIN   500,000 Units, Swish & Swallow, 4 Times Daily      pantoprazole 40 MG EC tablet  Commonly known as: PROTONIX  Replaces: omeprazole 20 MG capsule   40 mg, Oral, Every Morning   Start Date: February 13, 2021     PHARMACY CONSULT - PHARMACY TO DOSE   Does not apply, Continuous      PHARMACY CONSULT - REMDESIVIR   Does not apply, Continuous      sodium chloride 0.9 % solution 230 mL with remdesivir 100 mg/20mL solution 100 mg   100 mg, Intravenous, Every 24 Hours         Changes to Medications      Instructions Start Date   acetaminophen 500 MG tablet  Commonly known as: TYLENOL  What changed:   · medication strength  · how to take this  · when to take this  · reasons to take this   500 mg, Oral, Every 8 Hours PRN      multivitamin tablet tablet  What changed:   · how much to take  · how to take this  · when to take this  · additional instructions   1 tablet, Oral, Daily   Start Date: February 13, 2021        Continue These Medications      Instructions Start Date   atorvastatin 40 MG tablet  Commonly known as: LIPITOR   40 mg, Oral, Daily      Synthroid 125 MCG tablet  Generic drug: levothyroxine   Oral, Daily      tamsulosin 0.4 MG capsule 24 hr capsule  Commonly known as: FLOMAX   0.4 mg, Oral, Daily   Start Date: February 13, 2021        Stop These Medications    Alogliptin Benzoate 25 MG tablet     aspirin 81 MG tablet  Replaced by: aspirin 81 MG EC tablet     DECADRON DOSE PACK PO     Audra Root 550 MG capsule     glipizide 5 MG ER tablet  Commonly known as: glipiZIDE XL     Loradamed 10 MG tablet  Generic drug: loratadine     metFORMIN 850 MG  tablet  Commonly known as: GLUCOPHAGE     omeprazole 20 MG capsule  Commonly known as: priLOSEC  Replaced by: pantoprazole 40 MG EC tablet     Vitamin C 500 MG capsule  Replaced by: ascorbic acid 1000 MG tablet            Discharge Diet:   Diet Instructions     Diet: Consistent Carbohydrate, Regular      Discharge Diet:  Consistent Carbohydrate  Regular             Activity at Discharge:   Activity Instructions     Activity as Tolerated            Follow-up Appointments  No future appointments.      Oral Anguiano MD  02/12/21  16:20 CST

## 2021-02-12 NOTE — THERAPY TREATMENT NOTE
Patient Name: Alfie Joseph  : 1945    MRN: [id number]                              Today's Date: 2021       Admit Date: 2/3/2021    Visit Dx:     ICD-10-CM ICD-9-CM   1. Pneumonia due to COVID-19 virus  U07.1 480.8    J12.82 079.89   2. Acute respiratory failure with hypoxia (CMS/[facility]ly [facility])  J96.01 518.81   3. Impaired mobility and activities of daily living  Z74.09 V49.89    Z78.9    4. Impaired functional mobility, balance, gait, and endurance  Z74.09 V49.89     Patient Active Problem List   Diagnosis   • Aortic valve disorder   • Coronary artery disease involving native coronary artery of native heart without angina pectoris   • Hyperlipidemia   • COVID-19   • Pneumonia due to COVID-19 virus   • Acute respiratory failure (CMS/[facility])   • Diabetes mellitus (CMS/[facility])     Past Medical History:   Diagnosis Date   • Acquired hypothyroidism    • Acute bronchitis    • Allergic rhinitis    • Aortic valve disorder     Aortic valve disorder - AVR   • Aortic valve sclerosis    • Benign prostatic hyperplasia    • Coronary arteriosclerosis    • Coronary atherosclerosis of native coronary artery     Coronary atherosclerosis of native coronary artery - CABG   • Diabetes (CMS/[facility])    • Exanthematous disorder    • GERD (gastroesophageal reflux disease)    • Hemorrhoids     Hemorrhoids - internal & external   • History of echocardiogram 10/26/2015    Echocardiogram W/ color flow 19928 (2) - Limited 2D echocardiogram. Normal prosthetic AV.   • History of echocardiogram 2013    Echocardiogram W/O color flow 38424 (3) - Mildly depressed LV systolic function with EF of 45-50%. Moderate CLVH. Grade I diastolic dysfunction of the left ventricular myocardium. No evidence of pericardial effusion.   • Hyperlipidemia    • Hypothyroidism    • Infection of skin and subcutaneous tissue     Infection of skin AND/OR subcutaneous tissue   • Tinnitus    • Type 2 diabetes mellitus (CMS/[facility])    • Urinary tract infectious disease      Past  Surgical History:   Procedure Laterality Date   • CARDIAC CATHETERIZATION  10/23/2013    Cardiac cath 30717 (1) - Multivessel coronary artery disease. Moderate AV stenosis. Mild aortic valve regurgitation.   • CORONARY ARTERY BYPASS GRAFT     • ENDOSCOPY  01/25/2010    Colon endoscopy 72569 (1) - internal external hemorrhoids. Otherwise normal   • GALLBLADDER SURGERY     • HYDROCELECTOMY      Remove hydrocele (1)   • INJECTION OF MEDICATION  05/21/2012    Kenalog (1) - ELLI PEPE ([facility])     General Information     Row Name 02/12/21 1040          OT Time and Intention    Document Type  therapy note (daily note)  -LW     Mode of Treatment  individual therapy;occupational therapy  -     Row Name 02/12/21 1040          General Information    Patient Profile Reviewed  yes  -LW       User Key  (r) = Recorded By, (t) = Taken By, (c) = Cosigned By    Initials Name Provider Type    LW Jenny Marshall COTA/L Occupational Therapy Assistant          Mobility/ADL's     Row Name 02/12/21 1040          Bed Mobility    Bed Mobility  bed mobility (all) activities  -LW     All Activities, Louisa (Bed Mobility)  independent  -LW     Rolling Left [place]a (Bed Mobility)  independent  -LW     Rolling Right [place]a (Bed Mobility)  independent  -LW     Scooting/Bridging Louisa (Bed Mobility)  independent  -LW     Supine-Sit [place] (Bed Mobility)  independent  -LW     Sit-Supine [place] (Bed Mobility)  independent  -LW     Assistive Device (Bed Mobility)  head of bed elevated  -     Row Name 02/12/21 1040          Transfers    Transfers  toilet transfer  -LW     [place]isa Level (Toilet Transfer)  independent  -     Row Name 02/12/21 1040          Functional Mobility    Functional Mobility- Ind. Level  independent  -     Row Name 02/12/21 1040          Activities of Daily Living    BADL Assessment/Intervention  grooming;toileting  -     Row Name 02/12/21 1040          Grooming  Assessment/Training    [place] Level (Grooming)  wash face, hands;independent  -LW     Position (Grooming)  sink side  -     Row Name 02/12/21 1040          Toileting Assessment/Training    [place] Level (Toileting)  adjust/manage clothing;perform perineal hygiene;independent  -LW       User Key  (r) = Recorded By, (t) = Taken By, (c) = Cosigned By    Initials Name Provider Type    Jenny Silver COTA/L Occupational Therapy Assistant        Obj/Interventions    No documentation.       Goals/Plan     Row Name 02/12/21 1040          Bed Mobility Goal 1 (OT)    Activity/Assistive Device (Bed Mobility Goal 1, OT)  sit to supine/supine to sit  -LW     [place]ison Level/Cues Needed (Bed Mobility Goal 1, OT)  independent  -LW     Time Frame (Bed Mobility Goal 1, OT)  long term goal (LTG);by discharge  -LW     Progress/Outcomes (Bed Mobility Goal 1, OT)  goal not met  -[facility] Name 02/12/21 1040          Transfer Goal 1 (OT)    Activity/Assistive Device (Transfer Goal 1, OT)  sit-to-stand/stand-to-sit;toilet  -LW     [place]ison Level/Cues Needed (Transfer Goal 1, OT)  standby assist;verbal cues required;tactile cues required  -LW     Time Frame (Transfer Goal 1, OT)  long term goal (LTG);by discharge  -LW     Progress/Outcome (Transfer Goal 1, OT)  goal met  -[facility] Name 02/12/21 1040          Bathing Goal 1 (OT)    Activity/Device (Bathing Goal 1, OT)  lower body bathing  -LW     [place]ison Level/Cues Needed (Bathing Goal 1, OT)  standby assist;verbal cues required;tactile cues required  -LW     Time Frame (Bathing Goal 1, OT)  long term goal (LTG);by discharge  -LW     Progress/Outcomes (Bathing Goal 1, OT)  goal not met  -     Row Name 02/12/21 1040          Dressing Goal 1 (OT)    Activity/Device (Dressing Goal 1, OT)  lower body dressing  -LW     Addison/Cues Needed (Dressing Goal 1, OT)  standby assist;verbal cues required;tactile cues required  -LW     Time Frame (Dressing Goal 1,  OT)  long term goal (LTG);by discharge  -LW     Progress/Outcome (Dressing Goal 1, OT)  goal not met  -LW     Row Name 02/12/21 1040          Toileting Goal 1 (OT)    Activity/Device (Toileting Goal 1, OT)  toileting skills, all  -LW     [place] Level/Cues Needed (Toileting Goal 1, OT)  standby assist;verbal cues required;tactile cues required  -LW     Time Frame (Toileting Goal 1, OT)  long term goal (LTG);by discharge  -LW     Progress/Outcome (Toileting Goal 1, OT)  goal met  -LW       User Key  (r) = Recorded By, (t) = Taken By, (c) = Cosigned By    Initials Name Provider Type    Jenny Silver COTA/L Occupational Therapy Assistant        Clinical Impression     Row Name 02/12/21 1040          Pain Scale: Numbers Pre/Post-Treatment    Pretreatment Pain Rating  0/10 - no pain  -LW     Posttreatment Pain Rating  0/10 - no pain  -LW     Row Name 02/12/21 1040          Plan of Care Review    Progress  improving  -LW     Outcome Summary  Pt sup to sit and sit to sup, stand to sit , sit to stand Independent. T/f to bathroom Independent and toileting tasks Independent. Educated pt on Home safety.  -LW     Row Name 02/12/21 1040          Positioning and Restraints    Pre-Treatment Position  in bed  -LW     Post Treatment Position  bed  -LW     In Bed  supine;call light within reach;encouraged to call for assist;exit alarm on;side rails up x2  -LW       User Key  (r) = Recorded By, (t) = Taken By, (c) = Cosigned By    Initials Name Provider Type    Jenny Silver COTA/L Occupational Therapy Assistant        Outcome Measures     Row Name 02/12/21 1040          How much help from another is currently needed...    Putting on and taking off regular lower body clothing?  3  -LW     Bathing (including washing, rinsing, and drying)  3  -LW     Toileting (which includes using toilet bed pan or urinal)  4  -LW     Putting on and taking off regular upper body clothing  3  -LW     Taking care of personal grooming  (such as brushing teeth)  4  -LW     Eating meals  4  -LW     AM-PAC 6 Clicks Score (OT)  21  -LW       User Key  (r) = Recorded By, (t) = Taken By, (c) = Cosigned By    Initials Name Provider Type    Jenny Silver COTA/L Occupational Therapy Assistant        Occupational Therapy Education                 Title: PT OT SLP Therapies (In Progress)     Topic: Occupational Therapy (Done)     Point: ADL training (Done)     Description:   Instruct learner(s) on proper safety adaptation and remediation techniques during self care or transfers.   Instruct in proper use of assistive devices.              Learning Progress Summary           Patient Acceptance, E,TB,H, VU by LW at 2/12/2021 1430    Comment: Educated pt on Home Safety.                   Point: Home exercise program (Done)     Description:   Instruct learner(s) on appropriate technique for monitoring, assisting and/or progressing therapeutic exercises/activities.              Learning Progress Summary           Patient Acceptance, E,TB,H, VU by  at 2/12/2021 1430    Comment: Educated pt on Home Safety.                   Point: Precautions (Done)     Description:   Instruct learner(s) on prescribed precautions during self-care and functional transfers.              Learning Progress Summary           Patient Acceptance, E,TB,H, VU by LW at 2/12/2021 1430    Comment: Educated pt on Home Safety.    Acceptance, E, VU by ME at 2/9/2021 1433    Comment: Educated on OT and POC. Educated to call for assistance. Educated on safety precautions. Educated on proper body mechanics for BUE ther ex.    Acceptance, E, VU by  at 2/8/2021 1527    Comment: Pt educated on role of OT, d/c recs, and POC                   Point: Body mechanics (Done)     Description:   Instruct learner(s) on proper positioning and spine alignment during self-care, functional mobility activities and/or exercises.              Learning Progress Summary           Patient Acceptance, E,TB,H, VU  by  at 2/12/2021 1430    Comment: Educated pt on Home Safety.    Acceptance, E, VU by ME at 2/9/2021 1433    Comment: Educated on OT and POC. Educated to call for assistance. Educated on safety precautions. Educated on proper body mechanics for BUE ther ex.                               User Key     Initials Effective Dates Name Provider Type Discipline     03/07/18 -  Jenny Marshall COTA/L Occupational Therapy Assistant OT    ME 08/24/20 -  Shari Ryder OTR/L Occupational Therapist OT     09/10/19 -  Des Marquez OT Occupational Therapist OT              OT Recommendation and Plan     Plan of Care Review  Progress: improving  Outcome Summary: Pt sup to sit and sit to sup, stand to sit , sit to stand Independent. T/f to bathroom Independent and toileting tasks Independent. Educated pt on Home safety.     Time Calculation:   Time Calculation- OT     Row Name 02/12/21 1040             Time Calculation- OT    OT Start Time  1040  -LW      OT Stop Time  1110  -LW      OT Time Calculation (min)  30 min  -LW      Total Timed Code Minutes- OT  30 minute(s)  -LW      OT Received On  02/12/21  -        User Key  (r) = Recorded By, (t) = Taken By, (c) = Cosigned By    Initials Name Provider Type    LW Jenny Marshall COTA/L Occupational Therapy Assistant        Therapy Charges for Today     Code Description Service Date Service Provider Modifiers Qty    [id number] HC OT SELF CARE/MGMT/TRAIN EA 15 MIN 2/12/2021 Jenny Marshall COTA/L GO 2               LUZ Miranda  2/12/2021

## 2021-02-12 NOTE — PLAN OF CARE
Goal Outcome Evaluation:  Plan of Care Reviewed With: patient  Denies pain or discomfort.  Coughing moderate amount thick white sputum.  Denies shortness of breath.

## 2021-02-12 NOTE — PLAN OF CARE
Goal Outcome Evaluation:  Plan of Care Reviewed With: patient  Progress: improving  Outcome Summary: pt sup<>sit with independence, sit<>stand with SBA, pt ambulated 90` with SBA, pt's O2 stats decreased to 88% with  gait. pt would benefit from home with assistance

## 2021-02-12 NOTE — PROGRESS NOTES
Adult Nutrition  Assessment    Patient Name:  Alfie Joseph  YOB: 1945  MRN: [id number]  Admit Date:  2/3/2021    Assessment Date:  2/12/2021    Comments:   Pt continues to require AirVo- remedesivir, baricitinib and decadron in place. Gluc exacerbated by steroids- [id number] - SSI and levemir 25u hs in place. Alb 3.1L. MVI daily.  ADA cardiac diet, intakes >75% meals over last several days.  MD notes possib;e transfer to [facility] soon. RD will follow hospital course.        Reason for Assessment     Row Name 02/12/21 3812          Reason for Assessment    Reason For Assessment  follow-up protocol     Diagnosis  pulmonary disease;infection/sepsis     Identified At Risk by Screening Criteria  other (see comments)         Nutrition/Diet History     Row Name 02/12/21 1623          Nutrition/Diet History    Typical Food/Fluid Intake  Pt did not answer phone when RD called. Nurse notes good intake, on AirVo                 Nutrition Prescription Ordered     Row Name 02/12/21 1796          Nutrition Prescription PO    Current PO Diet  Regular     Common Modifiers  Cardiac;Consistent Carbohydrate         Evaluation of Received Nutrient/Fluid Intake     Row Name 02/12/21 2883          PO Evaluation    Number of Days PO Intake Evaluated  -- 4days     Number of Meals  11     % PO Intake  75x1, 100x10               Electronically signed by:  Lula Osborn RD  02/12/21 13:35 CST

## 2021-02-12 NOTE — THERAPY TREATMENT NOTE
Acute Care - Physical Therapy Treatment Note  [facility]     Patient Name: Alfie Joseph  : 1945  MRN: [id number]  Today's Date: 2021           PT Assessment (last 12 hours)      PT Evaluation and Treatment     Row Name 21 1302          Physical Therapy Time and Intention    Subjective Information  no complaints  -TA     Document Type  therapy note (daily note)  -TA     Mode of Treatment  individual therapy;physical therapy  -TA     Patient Effort  good  -TA     Row Name 21 1302          General Information    Patient Profile Reviewed  yes  -TA     Existing Precautions/Restrictions  fall;oxygen therapy device and L/min  -TA     Row Name 21 1302          Cognition    Affect/Mental Status (Cognitive)  WFL  -TA     Orientation Status (Cognition)  oriented x 4  -TA     Personal Safety Interventions  fall prevention program maintained;gait belt;muscle strengthening facilitated;nonskid shoes/slippers when out of bed;supervised activity  -TA     Row Name 21 1302          Pain Scale: Numbers Pre/Post-Treatment    Pretreatment Pain Rating  0/10 - no pain  -TA     Posttreatment Pain Rating  0/10 - no pain  -TA     Row Name 21 1302          Bed Mobility    Scooting/Bridging [place] (Bed Mobility)  modified independence  -TA     Supine-Sit [place] (Bed Mobility)  modified independence  -TA     Sit-Supine [place] (Bed Mobility)  modified independence  -TA     Row Name 21 1302          Transfers    Transfers  sit-stand transfer;stand-sit transfer  -TA     Comment (Transfers)  pt performed sit<>stand x 5  -TA     Sit-Stand [place] (Transfers)  standby assist  -TA     Stand-Sit [place] (Transfers)  standby assist  -TA     Row Name 21 1302          Sit-Stand Transfer    Assistive Device (Sit-Stand Transfers)  other (see comments) no AD  -TA     Row Name 21 1302          Stand-Sit Transfer    Assistive Device (Stand-Sit Transfers)  other (see  comments) no AD   -TA     Row Name 02/12/21 1302          Gait/Stairs (Locomotion)    [place] Level (Gait)  standby assist  -TA     Assistive Device (Gait)  other (see comments) no AD  -TA     Distance in Feet (Gait)  90`  -TA     Pattern (Gait)  step-through  -TA     Deviations/Abnormal Patterns (Gait)  stride length decreased  -TA     Row Name 02/12/21 1302          Safety Issues, Functional Mobility    Impairments Affecting Function (Mobility)  endurance/activity tolerance;shortness of breath  -TA     Row Name 02/12/21 1302          Hip (Therapeutic Exercise)    Hip (Therapeutic Exercise)  AROM (active range of motion)  -TA     Hip AROM (Therapeutic Exercise)  bilateral;flexion;aBduction;aDduction;sitting;10 repetitions;5 repetitions  -TA     Row Name 02/12/21 1302          Knee (Therapeutic Exercise)    Knee (Therapeutic Exercise)  AROM (active range of motion)  -TA     Knee AROM (Therapeutic Exercise)  LAQ (long arc quad);bilateral;sitting;10 repetitions;5 repetitions  -TA     Row Name 02/12/21 1302          Ankle (Therapeutic Exercise)    Ankle (Therapeutic Exercise)  AROM (active range of motion)  -TA     Ankle AROM (Therapeutic Exercise)  bilateral;dorsiflexion;plantarflexion;sitting;10 repetitions;5 repetitions  -TA     Row Name 02/12/21 1302          Plan of Care Review    Plan of Care Reviewed With  patient  -TA     Progress  improving  -TA     Outcome Summary  pt sup<>sit with independence, sit<>stand with SBA, pt ambulated 90` with SBA, pt's O2 stats decreased to 88% with  gait. pt would benefit from home with assistance  -TA     Row Name 02/12/21 1302          Vital Signs    Pre Systolic BP Rehab  124  -TA     Pre Treatment Diastolic BP  80  -TA     Post Systolic BP Rehab  144  -TA     Post Treatment Diastolic BP  80  -TA     Pretreatment Heart Rate (beats/min)  77  -TA     Intratreatment Heart Rate (beats/min)  81  -TA     Posttreatment Heart Rate (beats/min)  83  -TA     Pre SpO2 (%)  91  -TA      O2 Delivery Pre Treatment  hi-flow  -TA     Intra SpO2 (%)  88  -TA     O2 Delivery Intra Treatment  hi-flow  -TA     Post SpO2 (%)  83  -TA     O2 Delivery Post Treatment  hi-flow  -TA     Row Name 02/12/21 1302          Transfer Goal 1 (PT)    Activity/Assistive Device (Transfer Goal 1, PT)  sit-to-stand/stand-to-sit;bed-to-chair/chair-to-bed  -TA     [place] Level/Cues Needed (Transfer Goal 1, PT)  standby assist;independent O2 sats will not drop below 92%  -TA     Time Frame (Transfer Goal 1, PT)  by discharge  -TA     Progress/Outcome (Transfer Goal 1, PT)  goal partially met  -TA     Row Name 02/12/21 1302          Gait Training Goal 1 (PT)    Activity/Assistive Device (Gait Training Goal 1, PT)  gait (walking locomotion)  -TA     [place] Level (Gait Training Goal 1, PT)  contact guard assist;standby assist  -TA     Distance (Gait Training Goal 1, PT)  30ft or more x3 O2 sats will not drop below 92%  -TA     Time Frame (Gait Training Goal 1, PT)  by discharge  -TA     Progress/Outcome (Gait Training Goal 1, PT)  goal not met  -TA     Row Name 02/12/21 1302          ROM Goal 1 (PT)    ROM Goal 1 (PT)  Pt will tolerate LE exercises OOB in chair with VSS  -TA     Time Frame (ROM Goal 1, PT)  by discharge  -TA     Progress/Outcome (ROM Goal 1, PT)  goal partially met  -TA     Row Name 02/12/21 1302          Positioning and Restraints    Pre-Treatment Position  in bed  -TA     Post Treatment Position  bed  -TA     In Bed  sitting EOB;call light within reach  -TA     Row Name 02/12/21 1302          Therapy Assessment/Plan (PT)    Rehab Potential (PT)  good, to achieve stated therapy goals  -TA     Criteria for Skilled Interventions Met (PT)  yes;skilled treatment is necessary;meets criteria  -TA     Comment, Therapy Assessment/Plan (PT)  continue  -TA       User Key  (r) = Recorded By, (t) = Taken By, (c) = Cosigned By    Initials Name Provider Type    Brandie Ozuna, PTA Physical Therapy Assistant         Physical Therapy Education                 Title: PT OT SLP Therapies (In Progress)     Topic: Physical Therapy (In Progress)     Point: Mobility training (In Progress)     Learning Progress Summary           Patient Acceptance, E, NR by STEPHANIE at 2/8/2021 1653                   Point: Home exercise program (Not Started)     Learner Progress:  Not documented in this visit.          Point: Body mechanics (In Progress)     Learning Progress Summary           Patient Acceptance, E, NR by STEPHANIE at 2/8/2021 1653                   Point: Precautions (In Progress)     Learning Progress Summary           Patient Acceptance, E, NR by STEPHANIE at 2/8/2021 1653                               User Key     Initials Effective Dates Name Provider Type Discipline     04/03/18 -  Silvia Gonzalez, PT Physical Therapist PT              PT Recommendation and Plan  Anticipated Discharge Disposition (PT): home with assist, home with home health  Therapy Frequency (PT): other (see comments)(5-7 days /wk)  Plan of Care Reviewed With: patient  Progress: improving  Outcome Summary: pt sup<>sit with independence, sit<>stand with SBA, pt ambulated 90` with SBA, pt's O2 stats decreased to 88% with  gait. pt would benefit from home with assistance  Outcome Measures     Row Name 02/12/21 1500 02/11/21 1600          How much help from another person do you currently need...    Turning from your back to your side while in flat bed without using bedrails?  4  -TA  4  -TA     Moving from lying on back to sitting on the side of a flat bed without bedrails?  4  -TA  4  -TA     Moving to and from a bed to a chair (including a wheelchair)?  3  -TA  3  -TA     Standing up from a chair using your arms (e.g., wheelchair, bedside chair)?  3  -TA  3  -TA     Climbing 3-5 steps with a railing?  3  -TA  3  -TA     To walk in hospital room?  3  -TA  3  -TA     AM-PAC 6 Clicks Score (PT)  20  -TA  20  -TA        Functional Assessment    Outcome Measure Options  AM-PAC  6 Clicks Basic Mobility (PT)  -TA  AM-PAC 6 Clicks Basic Mobility (PT)  -TA       User Key  (r) = Recorded By, (t) = Taken By, (c) = Cosigned By    Initials Name Provider Type    Brandie Ozuna PTA Physical Therapy Assistant           Time Calculation:   PT Charges     Row Name 02/12/21 1519             Time Calculation    Start Time  1302  -TA      Stop Time  1331  -TA      Time Calculation (min)  29 min  -TA      PT Received On  02/12/21  -TA         Time Calculation- PT    Total Timed Code Minutes- PT  29 minute(s)  -TA        User Key  (r) = Recorded By, (t) = Taken By, (c) = Cosigned By    Initials Name Provider Type    Brandie Ozuna PTA Physical Therapy Assistant        Therapy Charges for Today     Code Description Service Date Service Provider Modifiers Qty    29923359277 HC GAIT TRAINING EA 15 MIN 2/11/2021 Brandie Recinos, PTA GP 1    13511487188 HC PT SELF CARE/MGMT/TRAIN EA 15 MIN 2/11/2021 Brandie Recinos, PTA GP 1    79783064298 HC GAIT TRAINING EA 15 MIN 2/12/2021 Brandie Recinos, PTA GP 1    82146729083 HC PT THER PROC EA 15 MIN 2/12/2021 Brandie Recinos, PTA GP 1          PT G-Codes  Outcome Measure Options: AM-PAC 6 Clicks Basic Mobility (PT)  AM-PAC 6 Clicks Score (PT): 20  AM-PAC 6 Clicks Score (OT): 21    Brandie Recinos PTA  2/12/2021

## 2021-02-13 LAB
GLUCOSE BLDC GLUCOMTR-MCNC: 221 MG/DL (ref 70–130)
GLUCOSE BLDC GLUCOMTR-MCNC: 92 MG/DL (ref 70–130)

## 2021-02-13 PROCEDURE — 25010000002 ENOXAPARIN PER 10 MG: Performed by: NURSE PRACTITIONER

## 2021-02-13 PROCEDURE — 63710000001 INSULIN DETEMIR PER 5 UNITS: Performed by: NURSE PRACTITIONER

## 2021-02-13 PROCEDURE — 82962 GLUCOSE BLOOD TEST: CPT

## 2021-02-13 PROCEDURE — 97535 SELF CARE MNGMENT TRAINING: CPT

## 2021-02-13 PROCEDURE — 25010000002 DEXAMETHASONE PER 1 MG: Performed by: NURSE PRACTITIONER

## 2021-02-13 PROCEDURE — 97162 PT EVAL MOD COMPLEX 30 MIN: CPT

## 2021-02-13 PROCEDURE — 97166 OT EVAL MOD COMPLEX 45 MIN: CPT

## 2021-02-13 PROCEDURE — 97110 THERAPEUTIC EXERCISES: CPT

## 2021-02-13 RX ORDER — ALBUTEROL SULFATE 90 UG/1
2 AEROSOL, METERED RESPIRATORY (INHALATION) EVERY 4 HOURS PRN
Status: DISCONTINUED | OUTPATIENT
Start: 2021-02-13 | End: 2021-03-03 | Stop reason: HOSPADM

## 2021-02-14 PROCEDURE — 25010000002 DEXAMETHASONE PER 1 MG: Performed by: NURSE PRACTITIONER

## 2021-02-14 PROCEDURE — 97116 GAIT TRAINING THERAPY: CPT

## 2021-02-14 PROCEDURE — 97110 THERAPEUTIC EXERCISES: CPT

## 2021-02-14 PROCEDURE — 82962 GLUCOSE BLOOD TEST: CPT

## 2021-02-14 PROCEDURE — 97530 THERAPEUTIC ACTIVITIES: CPT

## 2021-02-14 PROCEDURE — 25010000002 ENOXAPARIN PER 10 MG: Performed by: NURSE PRACTITIONER

## 2021-02-14 PROCEDURE — 63710000001 INSULIN DETEMIR PER 5 UNITS: Performed by: NURSE PRACTITIONER

## 2021-02-15 LAB
ALBUMIN SERPL-MCNC: 3.1 G/DL (ref 3.5–5.2)
ALBUMIN/GLOB SERPL: 1 G/DL
ALP SERPL-CCNC: 73 U/L (ref 39–117)
ALT SERPL W P-5'-P-CCNC: 34 U/L (ref 1–41)
ANION GAP SERPL CALCULATED.3IONS-SCNC: 5 MMOL/L (ref 5–15)
AST SERPL-CCNC: 20 U/L (ref 1–40)
BILIRUB SERPL-MCNC: 0.7 MG/DL (ref 0–1.2)
BUN SERPL-MCNC: 19 MG/DL (ref 8–23)
BUN/CREAT SERPL: 23.2 (ref 7–25)
CALCIUM SPEC-SCNC: 9 MG/DL (ref 8.6–10.5)
CHLORIDE SERPL-SCNC: 99 MMOL/L (ref 98–107)
CO2 SERPL-SCNC: 30 MMOL/L (ref 22–29)
CREAT SERPL-MCNC: 0.82 MG/DL (ref 0.76–1.27)
DEPRECATED RDW RBC AUTO: 40.5 FL (ref 37–54)
ERYTHROCYTE [DISTWIDTH] IN BLOOD BY AUTOMATED COUNT: 12.4 % (ref 12.3–15.4)
GFR SERPL CREATININE-BSD FRML MDRD: 92 ML/MIN/1.73
GLOBULIN UR ELPH-MCNC: 3.2 GM/DL
GLUCOSE BLDC GLUCOMTR-MCNC: 224 MG/DL (ref 70–130)
GLUCOSE BLDC GLUCOMTR-MCNC: 225 MG/DL (ref 70–130)
GLUCOSE BLDC GLUCOMTR-MCNC: 240 MG/DL (ref 70–130)
GLUCOSE BLDC GLUCOMTR-MCNC: 244 MG/DL (ref 70–130)
GLUCOSE BLDC GLUCOMTR-MCNC: 251 MG/DL (ref 70–130)
GLUCOSE BLDC GLUCOMTR-MCNC: 257 MG/DL (ref 70–130)
GLUCOSE BLDC GLUCOMTR-MCNC: 304 MG/DL (ref 70–130)
GLUCOSE BLDC GLUCOMTR-MCNC: 326 MG/DL (ref 70–130)
GLUCOSE BLDC GLUCOMTR-MCNC: 381 MG/DL (ref 70–130)
GLUCOSE SERPL-MCNC: 266 MG/DL (ref 65–99)
HCT VFR BLD AUTO: 42.4 % (ref 37.5–51)
HGB BLD-MCNC: 15.1 G/DL (ref 13–17.7)
MCH RBC QN AUTO: 32.3 PG (ref 26.6–33)
MCHC RBC AUTO-ENTMCNC: 35.6 G/DL (ref 31.5–35.7)
MCV RBC AUTO: 90.8 FL (ref 79–97)
PLATELET # BLD AUTO: 429 10*3/MM3 (ref 140–450)
PMV BLD AUTO: 9.8 FL (ref 6–12)
POTASSIUM SERPL-SCNC: 4.4 MMOL/L (ref 3.5–5.2)
PROT SERPL-MCNC: 6.3 G/DL (ref 6–8.5)
RBC # BLD AUTO: 4.67 10*6/MM3 (ref 4.14–5.8)
SODIUM SERPL-SCNC: 134 MMOL/L (ref 136–145)
WBC # BLD AUTO: 8.71 10*3/MM3 (ref 3.4–10.8)

## 2021-02-15 PROCEDURE — 85027 COMPLETE CBC AUTOMATED: CPT | Performed by: INTERNAL MEDICINE

## 2021-02-15 PROCEDURE — 97110 THERAPEUTIC EXERCISES: CPT

## 2021-02-15 PROCEDURE — 97535 SELF CARE MNGMENT TRAINING: CPT

## 2021-02-15 PROCEDURE — 63710000001 INSULIN DETEMIR PER 5 UNITS: Performed by: NURSE PRACTITIONER

## 2021-02-15 PROCEDURE — 25010000002 ENOXAPARIN PER 10 MG: Performed by: NURSE PRACTITIONER

## 2021-02-15 PROCEDURE — 80053 COMPREHEN METABOLIC PANEL: CPT | Performed by: INTERNAL MEDICINE

## 2021-02-15 PROCEDURE — 25010000002 DEXAMETHASONE PER 1 MG: Performed by: NURSE PRACTITIONER

## 2021-02-15 PROCEDURE — 82962 GLUCOSE BLOOD TEST: CPT

## 2021-02-15 PROCEDURE — 97530 THERAPEUTIC ACTIVITIES: CPT

## 2021-02-16 LAB
GLUCOSE BLDC GLUCOMTR-MCNC: 184 MG/DL (ref 70–130)
GLUCOSE BLDC GLUCOMTR-MCNC: 191 MG/DL (ref 70–130)
GLUCOSE BLDC GLUCOMTR-MCNC: 204 MG/DL (ref 70–130)
GLUCOSE BLDC GLUCOMTR-MCNC: 233 MG/DL (ref 70–130)

## 2021-02-16 PROCEDURE — 63710000001 DEXAMETHASONE PER 0.25 MG: Performed by: INTERNAL MEDICINE

## 2021-02-16 PROCEDURE — 63710000001 INSULIN DETEMIR PER 5 UNITS: Performed by: NURSE PRACTITIONER

## 2021-02-16 PROCEDURE — 97110 THERAPEUTIC EXERCISES: CPT

## 2021-02-16 PROCEDURE — 25010000002 ENOXAPARIN PER 10 MG: Performed by: NURSE PRACTITIONER

## 2021-02-16 PROCEDURE — 82962 GLUCOSE BLOOD TEST: CPT

## 2021-02-16 PROCEDURE — 97530 THERAPEUTIC ACTIVITIES: CPT

## 2021-02-16 PROCEDURE — 25010000002 DEXAMETHASONE PER 1 MG: Performed by: NURSE PRACTITIONER

## 2021-02-17 LAB
GLUCOSE BLDC GLUCOMTR-MCNC: 187 MG/DL (ref 70–130)
GLUCOSE BLDC GLUCOMTR-MCNC: 264 MG/DL (ref 70–130)
GLUCOSE BLDC GLUCOMTR-MCNC: 288 MG/DL (ref 70–130)
GLUCOSE BLDC GLUCOMTR-MCNC: 309 MG/DL (ref 70–130)

## 2021-02-17 PROCEDURE — 82962 GLUCOSE BLOOD TEST: CPT

## 2021-02-17 PROCEDURE — 63710000001 INSULIN DETEMIR PER 5 UNITS: Performed by: NURSE PRACTITIONER

## 2021-02-17 PROCEDURE — 63710000001 DEXAMETHASONE PER 0.25 MG: Performed by: INTERNAL MEDICINE

## 2021-02-17 PROCEDURE — 97110 THERAPEUTIC EXERCISES: CPT

## 2021-02-17 PROCEDURE — 97530 THERAPEUTIC ACTIVITIES: CPT

## 2021-02-17 PROCEDURE — 25010000002 ENOXAPARIN PER 10 MG: Performed by: NURSE PRACTITIONER

## 2021-02-18 LAB
ALBUMIN SERPL-MCNC: 2.9 G/DL (ref 3.5–5.2)
ALBUMIN/GLOB SERPL: 1 G/DL
ALP SERPL-CCNC: 70 U/L (ref 39–117)
ALT SERPL W P-5'-P-CCNC: 26 U/L (ref 1–41)
ANION GAP SERPL CALCULATED.3IONS-SCNC: 8 MMOL/L (ref 5–15)
AST SERPL-CCNC: 18 U/L (ref 1–40)
BILIRUB SERPL-MCNC: 0.5 MG/DL (ref 0–1.2)
BUN SERPL-MCNC: 12 MG/DL (ref 8–23)
BUN/CREAT SERPL: 15 (ref 7–25)
CALCIUM SPEC-SCNC: 9.1 MG/DL (ref 8.6–10.5)
CHLORIDE SERPL-SCNC: 100 MMOL/L (ref 98–107)
CO2 SERPL-SCNC: 29 MMOL/L (ref 22–29)
CREAT SERPL-MCNC: 0.8 MG/DL (ref 0.76–1.27)
DEPRECATED RDW RBC AUTO: 40.3 FL (ref 37–54)
ERYTHROCYTE [DISTWIDTH] IN BLOOD BY AUTOMATED COUNT: 12.4 % (ref 12.3–15.4)
GFR SERPL CREATININE-BSD FRML MDRD: 94 ML/MIN/1.73
GLOBULIN UR ELPH-MCNC: 2.9 GM/DL
GLUCOSE BLDC GLUCOMTR-MCNC: 242 MG/DL (ref 70–130)
GLUCOSE BLDC GLUCOMTR-MCNC: 257 MG/DL (ref 70–130)
GLUCOSE BLDC GLUCOMTR-MCNC: 278 MG/DL (ref 70–130)
GLUCOSE BLDC GLUCOMTR-MCNC: 304 MG/DL (ref 70–130)
GLUCOSE SERPL-MCNC: 218 MG/DL (ref 65–99)
HCT VFR BLD AUTO: 40.1 % (ref 37.5–51)
HGB BLD-MCNC: 14.2 G/DL (ref 13–17.7)
MCH RBC QN AUTO: 31.8 PG (ref 26.6–33)
MCHC RBC AUTO-ENTMCNC: 35.4 G/DL (ref 31.5–35.7)
MCV RBC AUTO: 89.9 FL (ref 79–97)
PLATELET # BLD AUTO: 366 10*3/MM3 (ref 140–450)
PMV BLD AUTO: 9.6 FL (ref 6–12)
POTASSIUM SERPL-SCNC: 3.9 MMOL/L (ref 3.5–5.2)
PROT SERPL-MCNC: 5.8 G/DL (ref 6–8.5)
RBC # BLD AUTO: 4.46 10*6/MM3 (ref 4.14–5.8)
SODIUM SERPL-SCNC: 137 MMOL/L (ref 136–145)
WBC # BLD AUTO: 8.73 10*3/MM3 (ref 3.4–10.8)

## 2021-02-18 PROCEDURE — 82962 GLUCOSE BLOOD TEST: CPT

## 2021-02-18 PROCEDURE — 97110 THERAPEUTIC EXERCISES: CPT | Performed by: PHYSICAL THERAPIST

## 2021-02-18 PROCEDURE — 85027 COMPLETE CBC AUTOMATED: CPT | Performed by: INTERNAL MEDICINE

## 2021-02-18 PROCEDURE — 63710000001 DEXAMETHASONE PER 0.25 MG: Performed by: INTERNAL MEDICINE

## 2021-02-18 PROCEDURE — 80053 COMPREHEN METABOLIC PANEL: CPT | Performed by: INTERNAL MEDICINE

## 2021-02-18 PROCEDURE — 97116 GAIT TRAINING THERAPY: CPT | Performed by: PHYSICAL THERAPIST

## 2021-02-18 PROCEDURE — 97110 THERAPEUTIC EXERCISES: CPT

## 2021-02-18 PROCEDURE — 63710000001 INSULIN DETEMIR PER 5 UNITS: Performed by: NURSE PRACTITIONER

## 2021-02-18 PROCEDURE — 97530 THERAPEUTIC ACTIVITIES: CPT

## 2021-02-18 PROCEDURE — 25010000002 ENOXAPARIN PER 10 MG: Performed by: NURSE PRACTITIONER

## 2021-02-19 LAB
GLUCOSE BLDC GLUCOMTR-MCNC: 229 MG/DL (ref 70–130)
GLUCOSE BLDC GLUCOMTR-MCNC: 296 MG/DL (ref 70–130)
GLUCOSE BLDC GLUCOMTR-MCNC: 350 MG/DL (ref 70–130)

## 2021-02-19 PROCEDURE — 97116 GAIT TRAINING THERAPY: CPT

## 2021-02-19 PROCEDURE — 97535 SELF CARE MNGMENT TRAINING: CPT

## 2021-02-19 PROCEDURE — 63710000001 INSULIN DETEMIR PER 5 UNITS: Performed by: NURSE PRACTITIONER

## 2021-02-19 PROCEDURE — 82962 GLUCOSE BLOOD TEST: CPT

## 2021-02-19 PROCEDURE — 25010000002 ENOXAPARIN PER 10 MG: Performed by: NURSE PRACTITIONER

## 2021-02-19 PROCEDURE — 97530 THERAPEUTIC ACTIVITIES: CPT

## 2021-02-19 PROCEDURE — 63710000001 DEXAMETHASONE PER 0.25 MG: Performed by: INTERNAL MEDICINE

## 2021-02-20 LAB
GLUCOSE BLDC GLUCOMTR-MCNC: 235 MG/DL (ref 70–130)
GLUCOSE BLDC GLUCOMTR-MCNC: 272 MG/DL (ref 70–130)
GLUCOSE BLDC GLUCOMTR-MCNC: 283 MG/DL (ref 70–130)
GLUCOSE BLDC GLUCOMTR-MCNC: 346 MG/DL (ref 70–130)
GLUCOSE BLDC GLUCOMTR-MCNC: 389 MG/DL (ref 70–130)

## 2021-02-20 PROCEDURE — 82962 GLUCOSE BLOOD TEST: CPT

## 2021-02-20 PROCEDURE — 97116 GAIT TRAINING THERAPY: CPT

## 2021-02-20 PROCEDURE — 25010000002 ENOXAPARIN PER 10 MG: Performed by: NURSE PRACTITIONER

## 2021-02-20 PROCEDURE — 63710000001 DEXAMETHASONE PER 0.25 MG: Performed by: INTERNAL MEDICINE

## 2021-02-20 PROCEDURE — 63710000001 INSULIN DETEMIR PER 5 UNITS: Performed by: NURSE PRACTITIONER

## 2021-02-20 PROCEDURE — 97530 THERAPEUTIC ACTIVITIES: CPT

## 2021-02-21 LAB
GLUCOSE BLDC GLUCOMTR-MCNC: 154 MG/DL (ref 70–130)
GLUCOSE BLDC GLUCOMTR-MCNC: 264 MG/DL (ref 70–130)
GLUCOSE BLDC GLUCOMTR-MCNC: 320 MG/DL (ref 70–130)
GLUCOSE BLDC GLUCOMTR-MCNC: 357 MG/DL (ref 70–130)

## 2021-02-21 PROCEDURE — 97110 THERAPEUTIC EXERCISES: CPT

## 2021-02-21 PROCEDURE — 97116 GAIT TRAINING THERAPY: CPT

## 2021-02-21 PROCEDURE — 63710000001 INSULIN DETEMIR PER 5 UNITS: Performed by: NURSE PRACTITIONER

## 2021-02-21 PROCEDURE — 25010000002 ENOXAPARIN PER 10 MG: Performed by: NURSE PRACTITIONER

## 2021-02-21 PROCEDURE — 82962 GLUCOSE BLOOD TEST: CPT

## 2021-02-22 LAB
ALBUMIN SERPL-MCNC: 3.2 G/DL (ref 3.5–5.2)
ALBUMIN/GLOB SERPL: 1 G/DL
ALP SERPL-CCNC: 75 U/L (ref 39–117)
ALT SERPL W P-5'-P-CCNC: 49 U/L (ref 1–41)
ANION GAP SERPL CALCULATED.3IONS-SCNC: 10 MMOL/L (ref 5–15)
AST SERPL-CCNC: 21 U/L (ref 1–40)
BILIRUB SERPL-MCNC: 0.5 MG/DL (ref 0–1.2)
BUN SERPL-MCNC: 14 MG/DL (ref 8–23)
BUN/CREAT SERPL: 17.5 (ref 7–25)
CALCIUM SPEC-SCNC: 8.9 MG/DL (ref 8.6–10.5)
CHLORIDE SERPL-SCNC: 98 MMOL/L (ref 98–107)
CO2 SERPL-SCNC: 30 MMOL/L (ref 22–29)
CREAT SERPL-MCNC: 0.8 MG/DL (ref 0.76–1.27)
DEPRECATED RDW RBC AUTO: 41.8 FL (ref 37–54)
ERYTHROCYTE [DISTWIDTH] IN BLOOD BY AUTOMATED COUNT: 12.9 % (ref 12.3–15.4)
GFR SERPL CREATININE-BSD FRML MDRD: 94 ML/MIN/1.73
GLOBULIN UR ELPH-MCNC: 3.3 GM/DL
GLUCOSE BLDC GLUCOMTR-MCNC: 146 MG/DL (ref 70–130)
GLUCOSE BLDC GLUCOMTR-MCNC: 270 MG/DL (ref 70–130)
GLUCOSE BLDC GLUCOMTR-MCNC: 323 MG/DL (ref 70–130)
GLUCOSE BLDC GLUCOMTR-MCNC: 346 MG/DL (ref 70–130)
GLUCOSE SERPL-MCNC: 159 MG/DL (ref 65–99)
HCT VFR BLD AUTO: 45.5 % (ref 37.5–51)
HGB BLD-MCNC: 16 G/DL (ref 13–17.7)
MCH RBC QN AUTO: 31.8 PG (ref 26.6–33)
MCHC RBC AUTO-ENTMCNC: 35.2 G/DL (ref 31.5–35.7)
MCV RBC AUTO: 90.5 FL (ref 79–97)
PLATELET # BLD AUTO: 273 10*3/MM3 (ref 140–450)
PMV BLD AUTO: 9.5 FL (ref 6–12)
POTASSIUM SERPL-SCNC: 4.1 MMOL/L (ref 3.5–5.2)
PROT SERPL-MCNC: 6.5 G/DL (ref 6–8.5)
RBC # BLD AUTO: 5.03 10*6/MM3 (ref 4.14–5.8)
SODIUM SERPL-SCNC: 138 MMOL/L (ref 136–145)
WBC # BLD AUTO: 11.33 10*3/MM3 (ref 3.4–10.8)

## 2021-02-22 PROCEDURE — 85027 COMPLETE CBC AUTOMATED: CPT | Performed by: INTERNAL MEDICINE

## 2021-02-22 PROCEDURE — 25010000002 ENOXAPARIN PER 10 MG: Performed by: NURSE PRACTITIONER

## 2021-02-22 PROCEDURE — 63710000001 INSULIN DETEMIR PER 5 UNITS: Performed by: NURSE PRACTITIONER

## 2021-02-22 PROCEDURE — 63710000001 DEXAMETHASONE PER 0.25 MG: Performed by: INTERNAL MEDICINE

## 2021-02-22 PROCEDURE — 80053 COMPREHEN METABOLIC PANEL: CPT | Performed by: INTERNAL MEDICINE

## 2021-02-22 PROCEDURE — 82962 GLUCOSE BLOOD TEST: CPT

## 2021-02-22 PROCEDURE — 97530 THERAPEUTIC ACTIVITIES: CPT

## 2021-02-22 RX ORDER — CLINDAMYCIN HYDROCHLORIDE 150 MG/1
300 CAPSULE ORAL 2 TIMES DAILY
Status: DISCONTINUED | OUTPATIENT
Start: 2021-02-22 | End: 2021-02-26

## 2021-02-23 LAB
GLUCOSE BLDC GLUCOMTR-MCNC: 123 MG/DL (ref 70–130)
GLUCOSE BLDC GLUCOMTR-MCNC: 272 MG/DL (ref 70–130)
GLUCOSE BLDC GLUCOMTR-MCNC: 318 MG/DL (ref 70–130)
GLUCOSE BLDC GLUCOMTR-MCNC: 338 MG/DL (ref 70–130)

## 2021-02-23 PROCEDURE — 82962 GLUCOSE BLOOD TEST: CPT

## 2021-02-23 PROCEDURE — 97110 THERAPEUTIC EXERCISES: CPT

## 2021-02-23 PROCEDURE — 25010000002 ENOXAPARIN PER 10 MG: Performed by: NURSE PRACTITIONER

## 2021-02-23 PROCEDURE — 97530 THERAPEUTIC ACTIVITIES: CPT

## 2021-02-23 PROCEDURE — 63710000001 INSULIN DETEMIR PER 5 UNITS: Performed by: NURSE PRACTITIONER

## 2021-02-23 PROCEDURE — 97535 SELF CARE MNGMENT TRAINING: CPT

## 2021-02-23 PROCEDURE — 63710000001 DEXAMETHASONE PER 0.25 MG: Performed by: INTERNAL MEDICINE

## 2021-02-23 RX ORDER — TAMSULOSIN HYDROCHLORIDE 0.4 MG/1
0.4 CAPSULE ORAL DAILY
Status: DISCONTINUED | OUTPATIENT
Start: 2021-02-23 | End: 2021-03-03 | Stop reason: HOSPADM

## 2021-02-23 RX ORDER — LEVOTHYROXINE SODIUM 0.12 MG/1
125 TABLET ORAL
Status: DISCONTINUED | OUTPATIENT
Start: 2021-02-23 | End: 2021-03-03 | Stop reason: HOSPADM

## 2021-02-24 LAB
GLUCOSE BLDC GLUCOMTR-MCNC: 113 MG/DL (ref 70–130)
GLUCOSE BLDC GLUCOMTR-MCNC: 231 MG/DL (ref 70–130)
GLUCOSE BLDC GLUCOMTR-MCNC: 340 MG/DL (ref 70–130)
GLUCOSE BLDC GLUCOMTR-MCNC: 365 MG/DL (ref 70–130)

## 2021-02-24 PROCEDURE — 63710000001 DEXAMETHASONE PER 0.25 MG: Performed by: INTERNAL MEDICINE

## 2021-02-24 PROCEDURE — 97116 GAIT TRAINING THERAPY: CPT

## 2021-02-24 PROCEDURE — 97530 THERAPEUTIC ACTIVITIES: CPT

## 2021-02-24 PROCEDURE — 82962 GLUCOSE BLOOD TEST: CPT

## 2021-02-24 PROCEDURE — 25010000002 ENOXAPARIN PER 10 MG: Performed by: NURSE PRACTITIONER

## 2021-02-24 PROCEDURE — 97110 THERAPEUTIC EXERCISES: CPT

## 2021-02-24 PROCEDURE — 63710000001 INSULIN DETEMIR PER 5 UNITS: Performed by: NURSE PRACTITIONER

## 2021-02-25 LAB
ALBUMIN SERPL-MCNC: 3.2 G/DL (ref 3.5–5.2)
ALBUMIN/GLOB SERPL: 1.2 G/DL
ALP SERPL-CCNC: 69 U/L (ref 39–117)
ALT SERPL W P-5'-P-CCNC: 54 U/L (ref 1–41)
ANION GAP SERPL CALCULATED.3IONS-SCNC: 7 MMOL/L (ref 5–15)
AST SERPL-CCNC: 20 U/L (ref 1–40)
BILIRUB SERPL-MCNC: 0.4 MG/DL (ref 0–1.2)
BUN SERPL-MCNC: 14 MG/DL (ref 8–23)
BUN/CREAT SERPL: 18.7 (ref 7–25)
CALCIUM SPEC-SCNC: 9 MG/DL (ref 8.6–10.5)
CHLORIDE SERPL-SCNC: 100 MMOL/L (ref 98–107)
CO2 SERPL-SCNC: 29 MMOL/L (ref 22–29)
CREAT SERPL-MCNC: 0.75 MG/DL (ref 0.76–1.27)
DEPRECATED RDW RBC AUTO: 42.8 FL (ref 37–54)
ERYTHROCYTE [DISTWIDTH] IN BLOOD BY AUTOMATED COUNT: 13.2 % (ref 12.3–15.4)
GFR SERPL CREATININE-BSD FRML MDRD: 102 ML/MIN/1.73
GLOBULIN UR ELPH-MCNC: 2.7 GM/DL
GLUCOSE BLDC GLUCOMTR-MCNC: 168 MG/DL (ref 70–130)
GLUCOSE BLDC GLUCOMTR-MCNC: 316 MG/DL (ref 70–130)
GLUCOSE BLDC GLUCOMTR-MCNC: 360 MG/DL (ref 70–130)
GLUCOSE BLDC GLUCOMTR-MCNC: 386 MG/DL (ref 70–130)
GLUCOSE SERPL-MCNC: 189 MG/DL (ref 65–99)
HCT VFR BLD AUTO: 41.3 % (ref 37.5–51)
HGB BLD-MCNC: 14.4 G/DL (ref 13–17.7)
MCH RBC QN AUTO: 31.6 PG (ref 26.6–33)
MCHC RBC AUTO-ENTMCNC: 34.9 G/DL (ref 31.5–35.7)
MCV RBC AUTO: 90.6 FL (ref 79–97)
PLATELET # BLD AUTO: 203 10*3/MM3 (ref 140–450)
PMV BLD AUTO: 9.4 FL (ref 6–12)
POTASSIUM SERPL-SCNC: 3.8 MMOL/L (ref 3.5–5.2)
PROT SERPL-MCNC: 5.9 G/DL (ref 6–8.5)
RBC # BLD AUTO: 4.56 10*6/MM3 (ref 4.14–5.8)
SODIUM SERPL-SCNC: 136 MMOL/L (ref 136–145)
WBC # BLD AUTO: 8.03 10*3/MM3 (ref 3.4–10.8)

## 2021-02-25 PROCEDURE — 82962 GLUCOSE BLOOD TEST: CPT

## 2021-02-25 PROCEDURE — 25010000002 ENOXAPARIN PER 10 MG: Performed by: NURSE PRACTITIONER

## 2021-02-25 PROCEDURE — 97110 THERAPEUTIC EXERCISES: CPT

## 2021-02-25 PROCEDURE — 63710000001 INSULIN DETEMIR PER 5 UNITS: Performed by: NURSE PRACTITIONER

## 2021-02-25 PROCEDURE — 80053 COMPREHEN METABOLIC PANEL: CPT | Performed by: INTERNAL MEDICINE

## 2021-02-25 PROCEDURE — 87070 CULTURE OTHR SPECIMN AEROBIC: CPT | Performed by: INTERNAL MEDICINE

## 2021-02-25 PROCEDURE — 87205 SMEAR GRAM STAIN: CPT | Performed by: INTERNAL MEDICINE

## 2021-02-25 PROCEDURE — 63710000001 DEXAMETHASONE PER 0.25 MG: Performed by: INTERNAL MEDICINE

## 2021-02-25 PROCEDURE — 87147 CULTURE TYPE IMMUNOLOGIC: CPT | Performed by: INTERNAL MEDICINE

## 2021-02-25 PROCEDURE — 87186 SC STD MICRODIL/AGAR DIL: CPT | Performed by: INTERNAL MEDICINE

## 2021-02-25 PROCEDURE — 97116 GAIT TRAINING THERAPY: CPT

## 2021-02-25 PROCEDURE — 85027 COMPLETE CBC AUTOMATED: CPT | Performed by: INTERNAL MEDICINE

## 2021-02-25 RX ORDER — LIDOCAINE HYDROCHLORIDE 10 MG/ML
5 INJECTION, SOLUTION EPIDURAL; INFILTRATION; INTRACAUDAL; PERINEURAL ONCE
Status: DISCONTINUED | OUTPATIENT
Start: 2021-02-25 | End: 2021-03-03 | Stop reason: HOSPADM

## 2021-02-26 LAB
GLUCOSE BLDC GLUCOMTR-MCNC: 225 MG/DL (ref 70–130)
GLUCOSE BLDC GLUCOMTR-MCNC: 292 MG/DL (ref 70–130)
GLUCOSE BLDC GLUCOMTR-MCNC: 310 MG/DL (ref 70–130)
GLUCOSE BLDC GLUCOMTR-MCNC: 353 MG/DL (ref 70–130)

## 2021-02-26 PROCEDURE — 63710000001 INSULIN DETEMIR PER 5 UNITS: Performed by: NURSE PRACTITIONER

## 2021-02-26 PROCEDURE — 97530 THERAPEUTIC ACTIVITIES: CPT

## 2021-02-26 PROCEDURE — 25010000002 ENOXAPARIN PER 10 MG: Performed by: NURSE PRACTITIONER

## 2021-02-26 PROCEDURE — 82962 GLUCOSE BLOOD TEST: CPT

## 2021-02-26 PROCEDURE — 63710000001 DEXAMETHASONE PER 0.25 MG: Performed by: INTERNAL MEDICINE

## 2021-02-26 RX ORDER — CLINDAMYCIN HYDROCHLORIDE 150 MG/1
300 CAPSULE ORAL EVERY 6 HOURS SCHEDULED
Status: DISCONTINUED | OUTPATIENT
Start: 2021-02-26 | End: 2021-03-03 | Stop reason: HOSPADM

## 2021-02-27 LAB
BACTERIA SPEC AEROBE CULT: ABNORMAL
GLUCOSE BLDC GLUCOMTR-MCNC: 198 MG/DL (ref 70–130)
GLUCOSE BLDC GLUCOMTR-MCNC: 232 MG/DL (ref 70–130)
GLUCOSE BLDC GLUCOMTR-MCNC: 302 MG/DL (ref 70–130)
GLUCOSE BLDC GLUCOMTR-MCNC: 363 MG/DL (ref 70–130)
GRAM STN SPEC: ABNORMAL
GRAM STN SPEC: ABNORMAL

## 2021-02-27 PROCEDURE — 63710000001 INSULIN DETEMIR PER 5 UNITS: Performed by: NURSE PRACTITIONER

## 2021-02-27 PROCEDURE — 97110 THERAPEUTIC EXERCISES: CPT

## 2021-02-27 PROCEDURE — 97530 THERAPEUTIC ACTIVITIES: CPT

## 2021-02-27 PROCEDURE — 97116 GAIT TRAINING THERAPY: CPT

## 2021-02-27 PROCEDURE — 82962 GLUCOSE BLOOD TEST: CPT

## 2021-02-27 PROCEDURE — 25010000002 ENOXAPARIN PER 10 MG: Performed by: NURSE PRACTITIONER

## 2021-02-28 LAB
GLUCOSE BLDC GLUCOMTR-MCNC: 192 MG/DL (ref 70–130)
GLUCOSE BLDC GLUCOMTR-MCNC: 217 MG/DL (ref 70–130)
GLUCOSE BLDC GLUCOMTR-MCNC: 315 MG/DL (ref 70–130)

## 2021-02-28 PROCEDURE — 82962 GLUCOSE BLOOD TEST: CPT

## 2021-02-28 PROCEDURE — 63710000001 INSULIN DETEMIR PER 5 UNITS: Performed by: NURSE PRACTITIONER

## 2021-02-28 PROCEDURE — 25010000002 ENOXAPARIN PER 10 MG: Performed by: NURSE PRACTITIONER

## 2021-03-01 LAB
ALBUMIN SERPL-MCNC: 3 G/DL (ref 3.5–5.2)
ALBUMIN/GLOB SERPL: 1.2 G/DL
ALP SERPL-CCNC: 65 U/L (ref 39–117)
ALT SERPL W P-5'-P-CCNC: 30 U/L (ref 1–41)
ANION GAP SERPL CALCULATED.3IONS-SCNC: 6 MMOL/L (ref 5–15)
AST SERPL-CCNC: 13 U/L (ref 1–40)
BILIRUB SERPL-MCNC: 0.3 MG/DL (ref 0–1.2)
BUN SERPL-MCNC: 14 MG/DL (ref 8–23)
BUN/CREAT SERPL: 16.7 (ref 7–25)
CALCIUM SPEC-SCNC: 8.8 MG/DL (ref 8.6–10.5)
CHLORIDE SERPL-SCNC: 103 MMOL/L (ref 98–107)
CO2 SERPL-SCNC: 30 MMOL/L (ref 22–29)
CREAT SERPL-MCNC: 0.84 MG/DL (ref 0.76–1.27)
DEPRECATED RDW RBC AUTO: 45.5 FL (ref 37–54)
ERYTHROCYTE [DISTWIDTH] IN BLOOD BY AUTOMATED COUNT: 13.6 % (ref 12.3–15.4)
GFR SERPL CREATININE-BSD FRML MDRD: 89 ML/MIN/1.73
GLOBULIN UR ELPH-MCNC: 2.5 GM/DL
GLUCOSE BLDC GLUCOMTR-MCNC: 195 MG/DL (ref 70–130)
GLUCOSE BLDC GLUCOMTR-MCNC: 255 MG/DL (ref 70–130)
GLUCOSE BLDC GLUCOMTR-MCNC: 284 MG/DL (ref 70–130)
GLUCOSE BLDC GLUCOMTR-MCNC: 287 MG/DL (ref 70–130)
GLUCOSE BLDC GLUCOMTR-MCNC: 405 MG/DL (ref 70–130)
GLUCOSE SERPL-MCNC: 180 MG/DL (ref 65–99)
HCT VFR BLD AUTO: 39.2 % (ref 37.5–51)
HGB BLD-MCNC: 13.6 G/DL (ref 13–17.7)
MCH RBC QN AUTO: 31.9 PG (ref 26.6–33)
MCHC RBC AUTO-ENTMCNC: 34.7 G/DL (ref 31.5–35.7)
MCV RBC AUTO: 92 FL (ref 79–97)
PLATELET # BLD AUTO: 143 10*3/MM3 (ref 140–450)
PMV BLD AUTO: 9.6 FL (ref 6–12)
POTASSIUM SERPL-SCNC: 4.6 MMOL/L (ref 3.5–5.2)
PROT SERPL-MCNC: 5.5 G/DL (ref 6–8.5)
RBC # BLD AUTO: 4.26 10*6/MM3 (ref 4.14–5.8)
SODIUM SERPL-SCNC: 139 MMOL/L (ref 136–145)
WBC # BLD AUTO: 6.9 10*3/MM3 (ref 3.4–10.8)

## 2021-03-01 PROCEDURE — 80053 COMPREHEN METABOLIC PANEL: CPT | Performed by: INTERNAL MEDICINE

## 2021-03-01 PROCEDURE — 97535 SELF CARE MNGMENT TRAINING: CPT

## 2021-03-01 PROCEDURE — 85027 COMPLETE CBC AUTOMATED: CPT | Performed by: INTERNAL MEDICINE

## 2021-03-01 PROCEDURE — 97110 THERAPEUTIC EXERCISES: CPT

## 2021-03-01 PROCEDURE — 97116 GAIT TRAINING THERAPY: CPT

## 2021-03-01 PROCEDURE — 82962 GLUCOSE BLOOD TEST: CPT

## 2021-03-01 PROCEDURE — 25010000002 ENOXAPARIN PER 10 MG: Performed by: NURSE PRACTITIONER

## 2021-03-01 PROCEDURE — 63710000001 INSULIN DETEMIR PER 5 UNITS: Performed by: NURSE PRACTITIONER

## 2021-03-02 LAB
GLUCOSE BLDC GLUCOMTR-MCNC: 168 MG/DL (ref 70–130)
GLUCOSE BLDC GLUCOMTR-MCNC: 226 MG/DL (ref 70–130)
GLUCOSE BLDC GLUCOMTR-MCNC: 316 MG/DL (ref 70–130)
GLUCOSE BLDC GLUCOMTR-MCNC: 321 MG/DL (ref 70–130)

## 2021-03-02 PROCEDURE — 25010000002 ENOXAPARIN PER 10 MG: Performed by: NURSE PRACTITIONER

## 2021-03-02 PROCEDURE — 97110 THERAPEUTIC EXERCISES: CPT

## 2021-03-02 PROCEDURE — 82962 GLUCOSE BLOOD TEST: CPT

## 2021-03-02 PROCEDURE — 97530 THERAPEUTIC ACTIVITIES: CPT

## 2021-03-02 PROCEDURE — 63710000001 INSULIN DETEMIR PER 5 UNITS: Performed by: NURSE PRACTITIONER

## 2021-03-03 VITALS — HEART RATE: 81 BPM

## 2021-03-03 LAB — GLUCOSE BLDC GLUCOMTR-MCNC: 178 MG/DL (ref 70–130)

## 2021-03-03 PROCEDURE — 82962 GLUCOSE BLOOD TEST: CPT

## 2021-03-22 ENCOUNTER — OFFICE VISIT (OUTPATIENT)
Dept: SURGERY | Facility: CLINIC | Age: 76
End: 2021-03-22

## 2021-03-22 VITALS
SYSTOLIC BLOOD PRESSURE: 122 MMHG | HEIGHT: 73 IN | TEMPERATURE: 96.9 F | DIASTOLIC BLOOD PRESSURE: 68 MMHG | BODY MASS INDEX: 25.31 KG/M2 | HEART RATE: 105 BPM | WEIGHT: 191 LBS

## 2021-03-22 DIAGNOSIS — L02.212 CUTANEOUS ABSCESS OF BACK EXCLUDING BUTTOCKS: Primary | ICD-10-CM

## 2021-03-22 PROCEDURE — 99203 OFFICE O/P NEW LOW 30 MIN: CPT | Performed by: SURGERY

## 2021-03-22 RX ORDER — ACETAMINOPHEN 325 MG/1
650 TABLET ORAL ONCE
Status: CANCELLED | OUTPATIENT
Start: 2021-04-01 | End: 2021-03-22

## 2021-03-22 RX ORDER — OMEPRAZOLE 20 MG/1
20 CAPSULE, DELAYED RELEASE ORAL DAILY
COMMUNITY

## 2021-03-22 RX ORDER — LORATADINE 10 MG/1
10 TABLET ORAL DAILY
COMMUNITY

## 2021-03-22 RX ORDER — SODIUM CHLORIDE 0.9 % (FLUSH) 0.9 %
3 SYRINGE (ML) INJECTION EVERY 12 HOURS SCHEDULED
Status: CANCELLED | OUTPATIENT
Start: 2021-04-01

## 2021-03-22 RX ORDER — BUPIVACAINE HCL/0.9 % NACL/PF 0.1 %
2 PLASTIC BAG, INJECTION (ML) EPIDURAL ONCE
Status: CANCELLED | OUTPATIENT
Start: 2021-04-01 | End: 2021-03-22

## 2021-03-22 RX ORDER — SODIUM CHLORIDE 0.9 % (FLUSH) 0.9 %
10 SYRINGE (ML) INJECTION AS NEEDED
Status: CANCELLED | OUTPATIENT
Start: 2021-04-01

## 2021-03-22 RX ORDER — ALOGLIPTIN 25 MG/1
TABLET, FILM COATED ORAL DAILY
COMMUNITY

## 2021-03-22 NOTE — PATIENT INSTRUCTIONS
MyPlate from USDA    MyPlate is an outline of a general healthy diet based on the 2010 Dietary Guidelines for Americans, from the U.S. Department of Agriculture (USDA). It sets guidelines for how much food you should eat from each food group based on your age, sex, and level of physical activity.  What are tips for following MyPlate?  To follow MyPlate recommendations:  · Eat a wide variety of fruits and vegetables, grains, and protein foods.  · Serve smaller portions and eat less food throughout the day.  · Limit portion sizes to avoid overeating.  · Enjoy your food.  · Get at least 150 minutes of exercise every week. This is about 30 minutes each day, 5 or more days per week.  It can be difficult to have every meal look like MyPlate. Think about MyPlate as eating guidelines for an entire day, rather than each individual meal.  Fruits and vegetables  · Make half of your plate fruits and vegetables.  · Eat many different colors of fruits and vegetables each day.  · For a 2,000 calorie daily food plan, eat:  ? 2½ cups of vegetables every day.  ? 2 cups of fruit every day.  · 1 cup is equal to:  ? 1 cup raw or cooked vegetables.  ? 1 cup raw fruit.  ? 1 medium-sized orange, apple, or banana.  ? 1 cup 100% fruit or vegetable juice.  ? 2 cups raw leafy greens, such as lettuce, spinach, or kale.  ? ½ cup dried fruit.  Grains  · One fourth of your plate should be grains.  · Make at least half of the grains you eat each day whole grains.  · For a 2,000 calorie daily food plan, eat 6 oz of grains every day.  · 1 oz is equal to:  ? 1 slice bread.  ? 1 cup cereal.  ? ½ cup cooked rice, cereal, or pasta.  Protein  · One fourth of your plate should be protein.  · Eat a wide variety of protein foods, including meat, poultry, fish, eggs, beans, nuts, and tofu.  · For a 2,000 calorie daily food plan, eat 5½ oz of protein every day.  · 1 oz is equal to:  ? 1 oz meat, poultry, or fish.  ? ¼ cup cooked beans.  ? 1 egg.  ? ½ oz nuts  or seeds.  ? 1 Tbsp peanut butter.  Dairy  · Drink fat-free or low-fat (1%) milk.  · Eat or drink dairy as a side to meals.  · For a 2,000 calorie daily food plan, eat or drink 3 cups of dairy every day.  · 1 cup is equal to:  ? 1 cup milk, yogurt, cottage cheese, or soy milk (soy beverage).  ? 2 oz processed cheese.  ? 1½ oz natural cheese.  Fats, oils, salt, and sugars  · Only small amounts of oils are recommended.  · Avoid foods that are high in calories and low in nutritional value (empty calories), like foods high in fat or added sugars.  · Choose foods that are low in salt (sodium). Choose foods that have less than 140 milligrams (mg) of sodium per serving.  · Drink water instead of sugary drinks. Drink enough water each day to keep your urine pale yellow.  Where to find support  · Work with your health care provider or a nutrition specialist (dietitian) to develop a customized eating plan that is right for you.  · Download an mundo (mobile application) to help you track your daily food intake.  Where to find more information  · Go to ChooseMyPlate.gov for more information.  Summary  · MyPlate is a general guideline for healthy eating from the USDA. It is based on the 2010 Dietary Guidelines for Americans.  · In general, fruits and vegetables should take up ½ of your plate, grains should take up ¼ of your plate, and protein should take up ¼ of your plate.  This information is not intended to replace advice given to you by your health care provider. Make sure you discuss any questions you have with your health care provider.  Document Revised: 05/21/2020 Document Reviewed: 03/19/2018  Elsevier Patient Education © 2021 Elsevier Inc.

## 2021-03-22 NOTE — PROGRESS NOTES
Subjective   Alfie Joseph is a 76 y.o. male     Chief Complaint: Chronic abscess left lower back    History of Present Illness presents with 6 to 8-week history of what sounds like a chronic abscess left lower back.  This is just to the left of the gluteal cleft proximally 2 cm of the midline.  Patient was admitted peristomal February to include being in the ICU for Covid pneumonia.  Patient states was in the hospital kept this area open to get a small amount of drainage.  No history of any similar infections in [place].  Patient was initially treated with clindamycin but not been on any antibiotics recently.  No fever no chills.  Wife is a retired same-day surgery nurse and she has been dressing.    Review of Systems   Constitutional: Negative.    HENT: Negative.    Eyes: Negative.    Respiratory: Negative.    Cardiovascular: Negative.    Gastrointestinal: Negative.    Endocrine: Negative.    Genitourinary: Negative.    Musculoskeletal: Negative.    Skin:        Wound left buttock   Allergic/Immunologic: Negative.    Neurological: Negative.    Hematological: Negative.    Psychiatric/Behavioral: Negative.      Past Medical History:   Diagnosis Date   • Acquired hypothyroidism    • Acute bronchitis    • Allergic rhinitis    • Aortic valve disorder     Aortic valve disorder - AVR   • Aortic valve sclerosis    • Benign prostatic hyperplasia    • Coronary arteriosclerosis    • Coronary atherosclerosis of native coronary artery     Coronary atherosclerosis of native coronary artery - CABG   • Diabetes (CMS/HCC)    • Exanthematous disorder    • GERD (gastroesophageal reflux disease)    • Hemorrhoids     Hemorrhoids - internal & external   • History of echocardiogram 10/26/2015    Echocardiogram W/ color flow 73489 (2) - Limited 2D echocardiogram. Normal prosthetic AV.   • History of echocardiogram 12/09/2013    Echocardiogram W/O color flow 63786 (3) - Mildly depressed LV systolic function with EF of 45-50%. Moderate CLVH.  Grade I diastolic dysfunction of the left ventricular myocardium. No evidence of pericardial effusion.   • Hyperlipidemia    • Hypothyroidism    • Infection of skin and subcutaneous tissue     Infection of skin AND/OR subcutaneous tissue   • Tinnitus    • Type 2 diabetes mellitus (CMS/HCC)    • Urinary tract infectious disease      Past Surgical History:   Procedure Laterality Date   • CARDIAC CATHETERIZATION  10/23/2013    Cardiac cath 21416 (1) - Multivessel coronary artery disease. Moderate AV stenosis. Mild aortic valve regurgitation.   • CORONARY ARTERY BYPASS GRAFT     • ENDOSCOPY  01/25/2010    Colon endoscopy 38357 (1) - internal external hemorrhoids. Otherwise normal   • GALLBLADDER SURGERY     • HYDROCELECTOMY      Remove hydrocele (1)   • INJECTION OF MEDICATION  05/21/2012    Kenalog (1) - ELLI PEPE ([place])     Family History   Problem Relation Age of Onset   • Breast cancer Mother    • Diabetes Sister    • Leukemia Brother    • Diabetes Brother    • Diabetes Sister      Social History     Socioeconomic History   • Marital status:      Spouse name: Not on file   • Number of children: Not on file   • Years of education: Not on file   • Highest education level: Not on file   Tobacco Use   • Smoking status: Former Smoker   • Smokeless tobacco: Former User   Substance and Sexual Activity   • Alcohol use: Yes     Comment: social   • Drug use: No   • Sexual activity: Defer     Comment:      No Known Allergies  Vitals:    03/22/21 1631   BP: 122/68   Pulse: 105   Temp: 96.9 °F (36.1 °C)       Home Medications:  Prior to Admission medications    Medication Sig Start Date End Date Taking? Authorizing Provider   acetaminophen (TYLENOL) 500 MG tablet Take 1 tablet by mouth Every 8 (Eight) Hours As Needed for Mild Pain  or Fever. 2/12/21  Yes Oral Anguiano MD   Alogliptin Benzoate 25 MG tablet Take  by mouth.   Yes Provider, MD Rhona   ascorbic acid (VITAMIN C) 1000 MG tablet Take 1  tablet by mouth Daily. 2/13/21  Yes Oral Anguiano MD   aspirin 81 MG EC tablet Take 1 tablet by mouth Daily. 2/13/21  Yes Oral Anguiano MD   atorvastatin (LIPITOR) 40 MG tablet Take 40 mg by mouth Daily.   Yes Emergency, Nurse Epic, RN   dextrose (GLUTOSE) 40 % gel Take 15 g by mouth Every 15 (Fifteen) Minutes As Needed for Low Blood Sugar (Blood sugar less than 70). 2/12/21  Yes Oral Anguiano MD   glucagon, human recombinant, (GLUCAGEN DIAGNOSTIC) 1 MG injection Inject 1 mg under the skin into the appropriate area as directed Every 15 (Fifteen) Minutes As Needed (Blood Glucose Less Than 70). 2/12/21  Yes Oral Anguiano MD   levothyroxine (SYNTHROID) 125 MCG tablet Take  by mouth Daily. 10/25/13  Yes Rhona Pennington MD   loratadine (CLARITIN) 10 MG tablet Take 10 mg by mouth Daily.   Yes Rhona Pennington MD   metFORMIN (GLUCOPHAGE) 1000 MG tablet Take 1,000 mg by mouth.   Yes Rhona Pennington MD   multivitamin (MULTIVITAMIN PO) Take 1 tablet by mouth Daily. 2/13/21  Yes Oral Anguiano MD   omeprazole (priLOSEC) 40 MG capsule Take 40 mg by mouth Daily.   Yes Rhona Pennington MD   tamsulosin (FLOMAX) 0.4 MG capsule 24 hr capsule Take 1 capsule by mouth Daily. 2/13/21  Yes Oral Anguiano MD   albuterol sulfate  (90 Base) MCG/ACT inhaler Inhale 2 puffs 4 (Four) Times a Day. 2/12/21 3/22/21 Yes Oral Anguiano MD   budesonide-formoterol (SYMBICORT) 160-4.5 MCG/ACT inhaler Inhale 2 puffs 2 (Two) Times a Day. 2/12/21 3/22/21 Yes Oral Anguiano MD   dexamethasone (DECADRON) 4 MG/ML injection Infuse 1.5 mL into a venous catheter 2 (Two) Times a Day. 2/12/21 3/22/21 Yes Oral Anguiano MD   dextrose (D50W) 50 % solution Infuse 50 mL into a venous catheter Every 15 (Fifteen) Minutes As Needed for Low Blood Sugar (Blood Sugar Less Than 70). 2/12/21 3/22/21 Yes Oral Anugiano MD   enoxaparin (LOVENOX) 40 MG/0.4ML solution syringe Inject 0.4 mL under the skin into the appropriate area as  directed Every Night. Indications: Prevention of Unwanted Clot in Veins 2/12/21 3/22/21 Yes Oral Anguiano MD   insulin aspart (novoLOG) 100 UNIT/ML injection Inject 0-14 Units under the skin into the appropriate area as directed 3 (Three) Times a Day Before Meals. 2/12/21 3/22/21 Yes Oral Anguiano MD   insulin detemir (LEVEMIR) 100 UNIT/ML injection Inject 25 Units under the skin into the appropriate area as directed Every Night. 2/12/21 3/22/21 Yes Oral Anguiano MD   ipratropium (ATROVENT HFA) 17 MCG/ACT inhaler Inhale 2 puffs 4 (Four) Times a Day. 2/12/21 3/22/21 Yes Oral Anguiano MD   mupirocin (BACTROBAN) 2 % ointment APPLY TO LEFT BUTTOCK ABSCESS TWICE DAILY 3/3/21 3/22/21 Yes ProviderRhona MD   pantoprazole (PROTONIX) 40 MG EC tablet Take 1 tablet by mouth Every Morning. 2/13/21 3/22/21 Yes Oral Anguiano MD   PHARMACY CONSULT - PHARMACY TO DOSE Continuous. 2/12/21 3/22/21 Yes Oral Anguiano MD   PHARMACY CONSULT - REMDESIVIR Continuous. 2/12/21 3/22/21 Yes Oral Anguiano MD       Objective   Physical Exam  Constitutional:       General: He is not in acute distress.     Appearance: He is well-developed.   HENT:      Head: Normocephalic and atraumatic.   Eyes:      General: No scleral icterus.     Conjunctiva/sclera: Conjunctivae normal.   Neck:      Thyroid: No thyromegaly.      Trachea: No tracheal deviation.   Cardiovascular:      Rate and Rhythm: Normal rate and regular rhythm.      Heart sounds: Normal heart sounds. No murmur heard.   No friction rub. No gallop.    Pulmonary:      Effort: Pulmonary effort is normal. No respiratory distress.      Breath sounds: Normal breath sounds. No stridor. No wheezing or rales.   Chest:      Chest wall: No tenderness.   Abdominal:      General: Bowel sounds are normal. There is no distension.      Palpations: Abdomen is soft. There is no mass.      Tenderness: There is no abdominal tenderness. There is no guarding or rebound.      Hernia: No hernia  is present.   Musculoskeletal:         General: No deformity. Normal range of motion.      Cervical back: Normal range of motion and neck supple.   Lymphadenopathy:      Cervical: No cervical adenopathy.   Skin:     General: Skin is warm and dry.      Coloration: Skin is not pale.      Findings: No erythema or rash.      Nails: There is no clubbing.   Neurological:      Mental Status: He is alert and oriented to person, place, and time.   Psychiatric:         Behavior: Behavior normal.         Thought Content: Thought content normal.     Chronic superficial abscess-like wound approximately 2 cm to the left of the gluteal cleft cranial 2 tip of coccyx.  No obvious pilonidal disease appreciated on the midline no obvious tracks toward the anus.  Area of induration is less than 2 cm in diameter.  No redness: No drainage    Assessment/Plan Chronic superficial abscess may be infected sebaceous cyst residual.  Would recommend attempted excision and then likely closure .  We discussed the procedure alternatives risk and benefits with the patient and his wife.  They are aware of that we may not be able to close this morning and packing open or close this may become infected and need to be opened up and treated with local wound care as an open wound.    The encounter diagnosis was Cutaneous abscess of back excluding buttocks.                     This document has been electronically signed by Nicola Kaiser MD on March 22, 2021 17:08 CDT

## 2021-03-29 ENCOUNTER — APPOINTMENT (OUTPATIENT)
Dept: PREADMISSION TESTING | Facility: HOSPITAL | Age: 76
End: 2021-03-29

## 2021-03-29 ENCOUNTER — LAB (OUTPATIENT)
Dept: LAB | Facility: HOSPITAL | Age: 76
End: 2021-03-29
Attending: SURGERY

## 2021-03-29 VITALS
OXYGEN SATURATION: 95 % | DIASTOLIC BLOOD PRESSURE: 72 MMHG | RESPIRATION RATE: 18 BRPM | HEART RATE: 93 BPM | SYSTOLIC BLOOD PRESSURE: 140 MMHG | WEIGHT: 193 LBS | HEIGHT: 74 IN | BODY MASS INDEX: 24.77 KG/M2

## 2021-03-29 DIAGNOSIS — Z01.818 PREOP TESTING: Primary | ICD-10-CM

## 2021-03-29 LAB — SARS-COV-2 N GENE RESP QL NAA+PROBE: NOT DETECTED

## 2021-03-29 PROCEDURE — C9803 HOPD COVID-19 SPEC COLLECT: HCPCS

## 2021-03-29 PROCEDURE — 87635 SARS-COV-2 COVID-19 AMP PRB: CPT

## 2021-03-29 RX ORDER — TAMSULOSIN HYDROCHLORIDE 0.4 MG/1
1 CAPSULE ORAL NIGHTLY
COMMUNITY

## 2021-03-29 RX ORDER — MAG HYDROX/ALUMINUM HYD/SIMETH 400-400-40
SUSPENSION, ORAL (FINAL DOSE FORM) ORAL 2 TIMES DAILY
COMMUNITY

## 2021-03-29 RX ORDER — SODIUM CHLORIDE 9 MG/ML
1000 INJECTION, SOLUTION INTRAVENOUS CONTINUOUS
Status: CANCELLED | OUTPATIENT
Start: 2021-04-01

## 2021-03-29 NOTE — DISCHARGE INSTRUCTIONS
[facility]  Pre-op Information and Guidelines    You will be called after 2 p.m. the day before your surgery (Friday for Monday surgery) and notified of your time for arrival and approximate surgery time.  If you have not received a call by 4P.M., please contact Same Day Surgery at [phone] of if outside [facility] call [phone].    Please Follow these Important Safety Guidelines:    • The morning of your procedure, take only the medications listed below with   A sip of water:_____________________________________________       ______________________________________________    • DO NOT eat or drink anything after 12:00 midnight the night before surgery  Specific instructions concerning drinking clear liquids will be discussed during  the pre-surgery instruction call the day before your surgery.    • If you take a blood thinner (ex. Plavix, Coumadin, aspirin), ask your doctor when to stop it before surgery  STOP DATE: _________________    • Only 2 visitors are allowed in patient rooms at a time  Your visitors will be asked to wait in the lobby until the admission process is complete with the exception of a parent with a child and patients in need of special assistance.    • YOU CANNOT DRIVE YOURSELF HOME  You must be accompanied by someone who will be responsible for driving you home after surgery and for your care at home.    • DO NOT chew gum, use breath mints, hard candy, or smoke the day of surgery  • DO NOT drink alcohol for at least 24 hours before your surgery  • DO NOT wear any jewelry and remove all body piercing before coming to the hospital  • DO NOT wear make-up to the hospital  • If you are having surgery on an extremity (arm/leg/foot) remove nail polish/artificial nails on the surgical side  • Clothing, glasses, contacts, dentures, and hairpieces must be removed before surgery  • Bathe the night before or the morning of your surgery and do not use powders/lotions on  skin.

## 2021-03-29 NOTE — PAT
Chlorhexidine scrub given with instruction sheet.  Instruction reviewed with pt and wife, understanding verbalized.    Dr Jo her to speak with pt and review ekg, no order received.

## 2021-03-31 ENCOUNTER — ANESTHESIA EVENT (OUTPATIENT)
Dept: PERIOP | Facility: HOSPITAL | Age: 76
End: 2021-03-31

## 2021-04-01 ENCOUNTER — ANESTHESIA (OUTPATIENT)
Dept: PERIOP | Facility: HOSPITAL | Age: 76
End: 2021-04-01

## 2021-04-01 ENCOUNTER — HOSPITAL ENCOUNTER (OUTPATIENT)
Facility: HOSPITAL | Age: 76
Setting detail: HOSPITAL OUTPATIENT SURGERY
Discharge: HOME | End: 2021-04-01
Attending: SURGERY | Admitting: SURGERY

## 2021-04-01 VITALS
HEIGHT: 74 IN | SYSTOLIC BLOOD PRESSURE: 100 MMHG | DIASTOLIC BLOOD PRESSURE: 55 MMHG | HEART RATE: 90 BPM | RESPIRATION RATE: 18 BRPM | OXYGEN SATURATION: 96 % | TEMPERATURE: 97.2 F | WEIGHT: 189.38 LBS | BODY MASS INDEX: 24.3 KG/M2

## 2021-04-01 DIAGNOSIS — L02.212 CUTANEOUS ABSCESS OF BACK EXCLUDING BUTTOCKS: ICD-10-CM

## 2021-04-01 LAB — GLUCOSE BLDC GLUCOMTR-MCNC: 225 MG/DL (ref 70–130)

## 2021-04-01 PROCEDURE — 82962 GLUCOSE BLOOD TEST: CPT

## 2021-04-01 PROCEDURE — 25010000002 ONDANSETRON PER 1 MG: Performed by: NURSE ANESTHETIST, CERTIFIED REGISTERED

## 2021-04-01 PROCEDURE — 11403 EXC TR-EXT B9+MARG 2.1-3CM: CPT | Performed by: SURGERY

## 2021-04-01 PROCEDURE — 25010000002 MIDAZOLAM PER 1 MG: Performed by: NURSE ANESTHETIST, CERTIFIED REGISTERED

## 2021-04-01 PROCEDURE — 25010000002 CEFAZOLIN PER 500 MG: Performed by: SURGERY

## 2021-04-01 PROCEDURE — 88304 TISSUE EXAM BY PATHOLOGIST: CPT

## 2021-04-01 PROCEDURE — 25010000002 PROPOFOL 10 MG/ML EMULSION: Performed by: NURSE ANESTHETIST, CERTIFIED REGISTERED

## 2021-04-01 PROCEDURE — 25010000002 FENTANYL CITRATE (PF) 100 MCG/2ML SOLUTION: Performed by: NURSE ANESTHETIST, CERTIFIED REGISTERED

## 2021-04-01 RX ORDER — NALOXONE HCL 0.4 MG/ML
0.4 VIAL (ML) INJECTION AS NEEDED
Status: CANCELLED | OUTPATIENT
Start: 2021-04-01

## 2021-04-01 RX ORDER — SODIUM CHLORIDE 0.9 % (FLUSH) 0.9 %
3 SYRINGE (ML) INJECTION EVERY 12 HOURS SCHEDULED
Status: DISCONTINUED | OUTPATIENT
Start: 2021-04-01 | End: 2021-04-01 | Stop reason: HOSPADM

## 2021-04-01 RX ORDER — FENTANYL CITRATE 50 UG/ML
INJECTION, SOLUTION INTRAMUSCULAR; INTRAVENOUS AS NEEDED
Status: DISCONTINUED | OUTPATIENT
Start: 2021-04-01 | End: 2021-04-01 | Stop reason: SURG

## 2021-04-01 RX ORDER — BUPIVACAINE HCL/0.9 % NACL/PF 0.1 %
2 PLASTIC BAG, INJECTION (ML) EPIDURAL ONCE
Status: COMPLETED | OUTPATIENT
Start: 2021-04-01 | End: 2021-04-01

## 2021-04-01 RX ORDER — ACETAMINOPHEN 325 MG/1
650 TABLET ORAL ONCE
Status: COMPLETED | OUTPATIENT
Start: 2021-04-01 | End: 2021-04-01

## 2021-04-01 RX ORDER — ACETAMINOPHEN 650 MG/1
650 SUPPOSITORY RECTAL ONCE AS NEEDED
Status: CANCELLED | OUTPATIENT
Start: 2021-04-01

## 2021-04-01 RX ORDER — DIPHENHYDRAMINE HYDROCHLORIDE 50 MG/ML
12.5 INJECTION INTRAMUSCULAR; INTRAVENOUS
Status: CANCELLED | OUTPATIENT
Start: 2021-04-01

## 2021-04-01 RX ORDER — PROMETHAZINE HYDROCHLORIDE 25 MG/1
25 SUPPOSITORY RECTAL ONCE AS NEEDED
Status: CANCELLED | OUTPATIENT
Start: 2021-04-01

## 2021-04-01 RX ORDER — SODIUM CHLORIDE 0.9 % (FLUSH) 0.9 %
10 SYRINGE (ML) INJECTION AS NEEDED
Status: DISCONTINUED | OUTPATIENT
Start: 2021-04-01 | End: 2021-04-01 | Stop reason: HOSPADM

## 2021-04-01 RX ORDER — PROMETHAZINE HYDROCHLORIDE 25 MG/1
25 TABLET ORAL ONCE AS NEEDED
Status: CANCELLED | OUTPATIENT
Start: 2021-04-01

## 2021-04-01 RX ORDER — ONDANSETRON 2 MG/ML
4 INJECTION INTRAMUSCULAR; INTRAVENOUS ONCE AS NEEDED
Status: CANCELLED | OUTPATIENT
Start: 2021-04-01

## 2021-04-01 RX ORDER — SODIUM CHLORIDE 9 MG/ML
1000 INJECTION, SOLUTION INTRAVENOUS CONTINUOUS
Status: DISCONTINUED | OUTPATIENT
Start: 2021-04-01 | End: 2021-04-01 | Stop reason: HOSPADM

## 2021-04-01 RX ORDER — MIDAZOLAM HYDROCHLORIDE 1 MG/ML
INJECTION INTRAMUSCULAR; INTRAVENOUS AS NEEDED
Status: DISCONTINUED | OUTPATIENT
Start: 2021-04-01 | End: 2021-04-01 | Stop reason: SURG

## 2021-04-01 RX ORDER — FLUMAZENIL 0.1 MG/ML
0.2 INJECTION INTRAVENOUS AS NEEDED
Status: CANCELLED | OUTPATIENT
Start: 2021-04-01

## 2021-04-01 RX ORDER — PROPOFOL 10 MG/ML
VIAL (ML) INTRAVENOUS AS NEEDED
Status: DISCONTINUED | OUTPATIENT
Start: 2021-04-01 | End: 2021-04-01 | Stop reason: SURG

## 2021-04-01 RX ORDER — ONDANSETRON 2 MG/ML
INJECTION INTRAMUSCULAR; INTRAVENOUS AS NEEDED
Status: DISCONTINUED | OUTPATIENT
Start: 2021-04-01 | End: 2021-04-01 | Stop reason: SURG

## 2021-04-01 RX ORDER — EPHEDRINE SULFATE 50 MG/ML
5 INJECTION, SOLUTION INTRAVENOUS ONCE AS NEEDED
Status: CANCELLED | OUTPATIENT
Start: 2021-04-01

## 2021-04-01 RX ORDER — TRAMADOL HYDROCHLORIDE 50 MG/1
50 TABLET ORAL EVERY 6 HOURS PRN
Qty: 10 TABLET | Refills: 0 | Status: SHIPPED | OUTPATIENT
Start: 2021-04-01 | End: 2023-03-03

## 2021-04-01 RX ORDER — KETAMINE HYDROCHLORIDE 100 MG/ML
INJECTION INTRAMUSCULAR; INTRAVENOUS AS NEEDED
Status: DISCONTINUED | OUTPATIENT
Start: 2021-04-01 | End: 2021-04-01 | Stop reason: SURG

## 2021-04-01 RX ORDER — ACETAMINOPHEN 325 MG/1
650 TABLET ORAL ONCE AS NEEDED
Status: CANCELLED | OUTPATIENT
Start: 2021-04-01

## 2021-04-01 RX ADMIN — PROPOFOL 10 MG: 10 INJECTION, EMULSION INTRAVENOUS at 12:55

## 2021-04-01 RX ADMIN — MIDAZOLAM HYDROCHLORIDE 1 MG: 2 INJECTION, SOLUTION INTRAMUSCULAR; INTRAVENOUS at 12:29

## 2021-04-01 RX ADMIN — PROPOFOL 20 MG: 10 INJECTION, EMULSION INTRAVENOUS at 12:47

## 2021-04-01 RX ADMIN — KETAMINE HYDROCHLORIDE 20 MG: 100 INJECTION INTRAMUSCULAR; INTRAVENOUS at 12:43

## 2021-04-01 RX ADMIN — PROPOFOL 20 MG: 10 INJECTION, EMULSION INTRAVENOUS at 12:58

## 2021-04-01 RX ADMIN — Medication 2 G: at 12:38

## 2021-04-01 RX ADMIN — ONDANSETRON 4 MG: 2 INJECTION INTRAMUSCULAR; INTRAVENOUS at 13:12

## 2021-04-01 RX ADMIN — PROPOFOL 20 MG: 10 INJECTION, EMULSION INTRAVENOUS at 13:03

## 2021-04-01 RX ADMIN — PROPOFOL 10 MG: 10 INJECTION, EMULSION INTRAVENOUS at 13:00

## 2021-04-01 RX ADMIN — SODIUM CHLORIDE 1000 ML: 9 INJECTION, SOLUTION INTRAVENOUS at 10:42

## 2021-04-01 RX ADMIN — FENTANYL CITRATE 25 MCG: 50 INJECTION INTRAMUSCULAR; INTRAVENOUS at 12:42

## 2021-04-01 RX ADMIN — PROPOFOL 20 MG: 10 INJECTION, EMULSION INTRAVENOUS at 12:42

## 2021-04-01 RX ADMIN — PROPOFOL 20 MG: 10 INJECTION, EMULSION INTRAVENOUS at 12:49

## 2021-04-01 RX ADMIN — ACETAMINOPHEN 650 MG: 325 TABLET ORAL at 10:52

## 2021-04-01 RX ADMIN — PROPOFOL 20 MG: 10 INJECTION, EMULSION INTRAVENOUS at 12:53

## 2021-04-01 RX ADMIN — MIDAZOLAM HYDROCHLORIDE 1 MG: 2 INJECTION, SOLUTION INTRAMUSCULAR; INTRAVENOUS at 12:36

## 2021-04-01 NOTE — INTERVAL H&P NOTE
H&P reviewed. The patient was examined and there are no changes to the H&P.  Site has gotten smaller and is approximately a centimeter and seems to be less irritated.  Again I suspect this is residual from an infected sebaceous cyst and likely will continue to be a chronic intermittent problem for him.  I would recommend we proceed with excising this is planned.  I went over with this with both him and his wife they clearly understand and wish to proceed with excision of the site and likely wound closure.    Temp:  [97.1 °F (36.2 °C)] 97.1 °F (36.2 °C)  Heart Rate:  [90] 90  Resp:  [18] 18  BP: (145)/(85) 145/85

## 2021-04-01 NOTE — ANESTHESIA POSTPROCEDURE EVALUATION
Patient: Alfie Joseph    Procedure Summary     Date: 04/01/21 Room / Location: [facility] OR 09 / [facility] OR    Anesthesia Start: 1232 Anesthesia Stop: 1339    Procedure: excision of chronic abscess left gluteal cleft (N/A ) Diagnosis:       Cutaneous abscess of back excluding buttocks      (Cutaneous abscess of back excluding buttocks [L02.212])    Surgeons: Nicola Kaiser MD Provider: Jair Sandoval MD    Anesthesia Type: general ASA Status: 4          Anesthesia Type: general    Vitals  Vitals Value Taken Time   /55 04/01/21 1318   Temp 97.2 °F (36.2 °C) 04/01/21 1318   Pulse 90 04/01/21 1318   Resp 18 04/01/21 1318   SpO2 96 % 04/01/21 1318           Post Anesthesia Care and Evaluation    Patient location during evaluation: bedside  Patient participation: complete - patient participated  Level of consciousness: awake and alert  Pain score: 0  Pain management: adequate  Airway patency: patent  Anesthetic complications: No anesthetic complications    Cardiovascular status: acceptable and hemodynamically stable  Respiratory status: acceptable and spontaneous ventilation  Hydration status: acceptable    Comments: ---------------------------               04/01/21                      1318         ---------------------------   BP:          100/55         Pulse:         90           Resp:          18           Temp:   97.2 °F (36.2 °C)   SpO2:          96%         ---------------------------

## 2021-04-01 NOTE — ANESTHESIA PREPROCEDURE EVALUATION
Anesthesia Evaluation     Patient summary reviewed and Nursing notes reviewed   no history of anesthetic complications:  NPO Solid Status: > 8 hours  NPO Liquid Status: > 4 hours           Airway   Mallampati: I  TM distance: >3 FB  Neck ROM: full  possible difficult intubation  Comment: Trimmed beard.  Dental    (+) edentulous    Pulmonary     breath sounds clear to auscultation  (+) a smoker Former, decreased breath sounds,     ROS comment: IMPRESSION:     1.  Persistent bilateral vague peripheral patchy infiltrates  compatible with the patient's given history of Covid 19. No  appreciable changes identified.     Commonly reported imaging features of COVID-19 pneumonia are  present. Other processes such as influenza pneumonia and  organizing pneumonia, as can be seen with drug toxicity and  connective tissue disease, can cause similar imaging pattern.  (PneTyp)           Electronically signed by:  Neisha Call MD  2/9/2021 3:03 PM CST  Cardiovascular     ECG reviewed  Rhythm: regular  Rate: normal    (+) valvular problems/murmurs murmur, CAD, CABG (5 vessel bypass and aortic valve replacement 2013. No problems since.), murmur (Grade III/VI systolic), hyperlipidemia,     ROS comment: Normal sinus rhythm  Left bundle branch block  Abnormal ECG  When compared with ECG of 14-NOV-2016 10:25,  No significant change was found  Confirmed by NEISHA HALL ([id number]) on 2/5/2021 4:16:16 PM    Neuro/Psych- negative ROS  GI/Hepatic/Renal/Endo    (+)  GERD well controlled,  diabetes mellitus type 2, thyroid problem hypothyroidism    Musculoskeletal (-) negative ROS    Abdominal    Substance History - negative use     OB/GYN negative ob/gyn ROS         Other - negative ROS                     Anesthesia Plan    ASA 4     general and MAC   total IV anesthesia  intravenous induction     Anesthetic plan, all risks, benefits, and alternatives have been provided, discussed and informed consent has been obtained with: patient and  spouse/significant other.

## 2021-04-01 NOTE — OP NOTE
SKIN LESION EXCISION  Procedure Note    Alfie Joseph  4/1/2021    Pre-op Diagnosis:   Cutaneous abscess of back excluding buttocks [L02.212]    Post-op Diagnosis:     Post-Op Diagnosis Codes:     * Cutaneous abscess of back excluding buttocks [L02.212]    Procedure/CPT® Codes:      Procedure(s):  excision of chronic abscess left gluteal cleft    Surgeon(s):  Nicola Kaiser MD    Anesthesia: Monitored Anesthesia Care    Staff:   Circulator: Sary Aguirre RN; Marcie Templeton RN  Scrub Person: Flory Emmanuel; Shikha Sewell  Assistant: Sofía Osorio CSA    Assistant: Sofía Osorio CSA was responsible for performing the following activities: Retraction, Suction, Irrigation, Suturing, Closing and Placing Dressing and their skilled assistance was necessary for the success of this case.     Estimated Blood Loss: none    Specimens:                ID Type Source Tests Collected by Time   A (Not marked as sent) : LEFT GLUTEAL CLEFT ABSCESS Tissue Buttock, Left TISSUE PATHOLOGY EXAM Nicola Kaiser MD 4/1/2021 1307         Drains: * No LDAs found *    Indications: 76-year-old gentleman with a month or so history of a chronically intermittent draining swollen area just to the left of the gluteal cleft.  Suspect this is residual infected sebaceous cyst.  Could also be pilonidal cyst though there is no other signs of that.  Patient presents now to have this excised.    Findings: Approximately centimeter area subcutaneously at the site able to be completely excised without any obvious connections to the midline or the perianal area.  Suspect this was a chronic sebaceous cyst.  Incision was approximate 3 cm in length at the completion    Complications: None    Procedure:  The patient was brought to the operating room where he was placed under MAC anesthesia without any difficulty.  He was placed right side down, left side up.  He had SCDs in place.  He received Ancef IV antibiotics.  The area was prepped and  draped in normal sterile fashion.  An appropriate timeout was taken, everyone was in agreement.  We infiltrated a total of about 10 mL of local.  We had already marked on the skin where this site was.  We excised an ellipse of skin, in a craniocaudal orientation, grossly around the palpable subcutaneous nodule and then followed dissection down initially with a scalpel and then with electrocautery.  The entire nodule was excised to grossly normal tissue planes and we excised skin and subcutaneous tissue.  No muscle involved.  Specimen was handed off to be sent to pathology.  We irrigated.  Good hemostasis was assured with electrocautery.  We then closed the wound with interrupted 3-0 nylon vertical mattress and simple stitches.  At completion, the incision was approximately 3 cm in length.  The patient was awakened, transferred back to same day surgery in stable condition.           Disposition: Transfer to recovery room in stable condition        Nicola Kaiser MD     Date: 4/1/2021  Time: 13:19 CDT

## 2021-04-06 LAB
LAB AP CASE REPORT: NORMAL
PATH REPORT.FINAL DX SPEC: NORMAL

## 2021-04-07 ENCOUNTER — OFFICE VISIT (OUTPATIENT)
Dept: SURGERY | Facility: CLINIC | Age: 76
End: 2021-04-07

## 2021-04-07 VITALS
DIASTOLIC BLOOD PRESSURE: 80 MMHG | HEART RATE: 82 BPM | HEIGHT: 74 IN | WEIGHT: 193 LBS | TEMPERATURE: 96.9 F | SYSTOLIC BLOOD PRESSURE: 132 MMHG | BODY MASS INDEX: 24.77 KG/M2

## 2021-04-07 DIAGNOSIS — L02.212 CUTANEOUS ABSCESS OF BACK EXCLUDING BUTTOCKS: Primary | ICD-10-CM

## 2021-04-07 PROCEDURE — 99024 POSTOP FOLLOW-UP VISIT: CPT | Performed by: SURGERY

## 2021-04-07 RX ORDER — GLIPIZIDE 10 MG/1
10 TABLET ORAL
COMMUNITY

## 2021-04-07 NOTE — PROGRESS NOTES
76-year-old gentleman who is now 1 week out from excision of an abscess from his left gluteal cleft.  Time of surgery this did not appear to be pilonidal disease or perirectal disease.  Suspect this is primary skin disease.  Patient is doing well no complaints.  His incision is intact.  Pathology was consistent with a subcutaneous abscess I went over pathology with both him and his wife answered all the questions.  Incision is clean tach.  We removed every other suture.  Patient's wife is a retired nurse she can remove the rest of the sutures in another week.  Patient will follow up with us he has any other concerns or questions

## 2021-04-30 ENCOUNTER — IMMUNIZATION (OUTPATIENT)
Dept: VACCINE CLINIC | Facility: HOSPITAL | Age: 76
End: 2021-04-30

## 2021-04-30 PROCEDURE — 91300 HC SARSCOV02 VAC 30MCG/0.3ML IM: CPT | Performed by: THORACIC SURGERY (CARDIOTHORACIC VASCULAR SURGERY)

## 2021-04-30 PROCEDURE — 0001A: CPT | Performed by: THORACIC SURGERY (CARDIOTHORACIC VASCULAR SURGERY)

## 2021-05-21 ENCOUNTER — IMMUNIZATION (OUTPATIENT)
Dept: VACCINE CLINIC | Facility: HOSPITAL | Age: 76
End: 2021-05-21

## 2021-05-21 PROCEDURE — 0002A: CPT | Performed by: THORACIC SURGERY (CARDIOTHORACIC VASCULAR SURGERY)

## 2021-05-21 PROCEDURE — 91300 HC SARSCOV02 VAC 30MCG/0.3ML IM: CPT | Performed by: THORACIC SURGERY (CARDIOTHORACIC VASCULAR SURGERY)

## 2021-06-09 ENCOUNTER — OFFICE VISIT (OUTPATIENT)
Dept: SURGERY | Facility: CLINIC | Age: 76
End: 2021-06-09

## 2021-06-09 VITALS
HEIGHT: 74 IN | HEART RATE: 83 BPM | DIASTOLIC BLOOD PRESSURE: 66 MMHG | BODY MASS INDEX: 25.33 KG/M2 | WEIGHT: 197.4 LBS | SYSTOLIC BLOOD PRESSURE: 134 MMHG | TEMPERATURE: 98.2 F

## 2021-06-09 DIAGNOSIS — Z51.89 VISIT FOR WOUND CHECK: Primary | ICD-10-CM

## 2021-06-09 PROCEDURE — 99024 POSTOP FOLLOW-UP VISIT: CPT | Performed by: SURGERY

## 2021-06-09 NOTE — PATIENT INSTRUCTIONS
"BMI for Adults  What is BMI?  Body mass index (BMI) is a number that is calculated from a person's weight and height. BMI can help estimate how much of a person's weight is composed of fat. BMI does not measure body fat directly. Rather, it is an alternative to procedures that directly measure body fat, which can be difficult and expensive.  BMI can help identify people who may be at higher risk for certain medical problems.  What are BMI measurements used for?  BMI is used as a screening tool to identify possible weight problems. It helps determine whether a person is obese, overweight, a healthy weight, or underweight.  BMI is useful for:  · Identifying a weight problem that may be related to a medical condition or may increase the risk for medical problems.  · Promoting changes, such as changes in diet and exercise, to help reach a healthy weight. BMI screening can be repeated to see if these changes are working.  How is BMI calculated?  BMI involves measuring your weight in relation to your height. Both height and weight are measured, and the BMI is calculated from those numbers. This can be done either in English ([place] or metric measurements. Note that charts and online BMI calculators are available to help you find your BMI quickly and easily without having to do these calculations yourself.  To calculate your BMI in English ([place] measurements:    1. Measure your weight in pounds (lb).  2. Multiply the number of pounds by 703.  ? For example, for a person who weighs 180 lb, multiply that number by 703, which equals 126,540.  3. Measure your height in inches. Then multiply that number by itself to get a measurement called \"inches squared.\"  ? For example, for a person who is 70 inches tall, the \"inches squared\" measurement is 70 inches x 70 inches, which equals 4,900 inches squared.  4. Divide the total from step 2 (number of lb x 703) by the total from step 3 (inches squared): 126,540 ÷ 4,900 = 25.8. This is " "your BMI.  To calculate your BMI in metric measurements:  1. Measure your weight in kilograms (kg).  2. Measure your height in meters (m). Then multiply that number by itself to get a measurement called \"meters squared.\"  ? For example, for a person who is 1.75 m tall, the \"meters squared\" measurement is 1.75 m x 1.75 m, which is equal to 3.1 meters squared.  3. Divide the number of kilograms (your weight) by the meters squared number. In this example: 70 ÷ 3.1 = 22.6. This is your BMI.  What do the results mean?  BMI charts are used to identify whether you are underweight, normal weight, overweight, or obese. The following guidelines will be used:  · Underweight: BMI less than 18.5.  · Normal weight: BMI between 18.5 and 24.9.  · Overweight: BMI between 25 and 29.9.  · Obese: BMI of 30 or above.  Keep these notes in mind:  · Weight includes both fat and muscle, so someone with a muscular build, such as an athlete, may have a BMI that is higher than 24.9. In cases like these, BMI is not an accurate measure of body fat.  · To determine if excess body fat is the cause of a BMI of 25 or higher, further assessments may need to be done by a health care provider.  · BMI is usually interpreted in the same way for men and women.  Where to find more information  For more information about BMI, including tools to quickly calculate your BMI, go to these websites:  · Centers for Disease Control and Prevention: www.cdc.gov  · American Heart Association: www.heart.org  · National Heart, Lung, and Blood [place]: www.nhlbi.nih.gov  Summary  · Body mass index (BMI) is a number that is calculated from a person's weight and height.  · BMI may help estimate how much of a person's weight is composed of fat. BMI can help identify those who may be at higher risk for certain medical problems.  · BMI can be measured using English measurements or metric measurements.  · BMI charts are used to identify whether you are underweight, normal " weight, overweight, or obese.  This information is not intended to replace advice given to you by your health care provider. Make sure you discuss any questions you have with your health care provider.  Document Revised: 09/09/2020 Document Reviewed: 07/17/2020  Elsevier Patient Education © 2021 [employer]

## 2021-06-09 NOTE — PROGRESS NOTES
76-year-old gentleman who is now over 2 months out from excision of an abscess from his lower back gluteal cleft area.  Patient was concerned and went into the incision he has a small bump but it may be a residual suture.  Patient's wound was closed with Nurolon stitches and his wife took the remainder of his stitches out.  His wife is retired nurse.  She thinks she may have left portion of one of the sutures in place.  He does have a small subcutaneous nodule at the cranial portion of his incision.  This is not red and is not really tender it does not feel fluctuant.  It may very well be a residual portion of the suture.  Suspect it will likely work its way to the surface and went over that with him and his wife.  If this becomes obviously infected or starts hurting more or does not come out another month or so we can open up the wound and take this out.  Both he and his wife understand that we will follow up with me on a as needed basis otherwise.

## 2023-02-08 ENCOUNTER — OFFICE VISIT (OUTPATIENT)
Dept: GASTROENTEROLOGY | Facility: CLINIC | Age: 78
End: 2023-02-08
Payer: MEDICARE

## 2023-02-08 VITALS
WEIGHT: 204.8 LBS | HEART RATE: 82 BPM | BODY MASS INDEX: 26.28 KG/M2 | OXYGEN SATURATION: 97 % | SYSTOLIC BLOOD PRESSURE: 128 MMHG | DIASTOLIC BLOOD PRESSURE: 62 MMHG | HEIGHT: 74 IN

## 2023-02-08 DIAGNOSIS — R19.5 OCCULT BLOOD POSITIVE STOOL: Primary | ICD-10-CM

## 2023-02-08 DIAGNOSIS — Z12.11 ENCOUNTER FOR SCREENING FOR MALIGNANT NEOPLASM OF COLON: ICD-10-CM

## 2023-02-08 PROCEDURE — 99203 OFFICE O/P NEW LOW 30 MIN: CPT | Performed by: PHYSICIAN ASSISTANT

## 2023-02-08 RX ORDER — DEXTROSE AND SODIUM CHLORIDE 5; .45 G/100ML; G/100ML
30 INJECTION, SOLUTION INTRAVENOUS CONTINUOUS PRN
Status: CANCELLED | OUTPATIENT
Start: 2023-03-07

## 2023-02-08 NOTE — PROGRESS NOTES
Chief Complaint   Patient presents with    Hemorrhage of Anus and Rectum     [facility] By: [facility]       ENDO PROCEDURE ORDERED: COLON +occult, screen    Subjective    Alfie Joseph is a 78 y.o. male. he is being seen for consultation today at the request of Dr. Leonard, [facility].    History of Present Illness    This 78-year-old male was [facility] with [facility]23-2023. Some of the laboratories available from the [facility] note dated 2022 showed normal CBC, occult stool negative, normal PSA. Prior to that had laboratory 2022. Cholesterol panel showed triglycerides 159. Normal CBC and TSH. PSA 2.0. Occult positive stool. Laboratory 2022 showed glucose 131, sodium 141, normal BUN and creatinine, calcium, AST, ALT, cholesterol, triglycerides 180, A1C 7.2%. The patient thinks he had his last colonoscopy about 10 years ago. He has been seeing blood on the tissue paper primarily, otherwise bowel movements are generally regular without pain. GERD does well on the Prilosec 20 mg daily. Denied nausea and vomiting. He states his weight has been stable.     The patient was a previous smoker. Does drink beer on occasion. He has had open heart surgery. He had COVID and spent a month in the hospital in . He has had a cholecystectomy, BPH, high cholesterol, thyroid problems and diabetes. Family history of diabetes, heart disease, cancer, stroke, gallstones, and hypertension. Parents are . Spouse in good health,  since . Two brothers, one sister, three children all in good health. Four brothers and three sisters .     A/P: Patient with rectal hemorrhage with previous occult positive stool, certainly occult lesion is of concern. Recommend colonoscopy to evaluate. Will plan followup after the above, further pending clinical course and the results of the above.       The following portions of the patient's history were reviewed and updated  as appropriate:   Past Medical History:   Diagnosis Date    Acute bronchitis     Allergic rhinitis     Aortic valve disorder     Aortic valve disorder - AVR    Aortic valve sclerosis     Arthritis     Benign prostatic hyperplasia     Coronary atherosclerosis of native coronary artery     Coronary atherosclerosis of native coronary artery - CABG    Diabetes (HCC)     Exanthematous disorder     GERD (gastroesophageal reflux disease)     Hemorrhoids     Hemorrhoids - internal & external    History of echocardiogram 10/26/2015    Echocardiogram W/ color flow 48681 (2) - Limited 2D echocardiogram. Normal prosthetic AV.    History of echocardiogram 12/09/2013    Echocardiogram W/O color flow 38894 (3) - Mildly depressed LV systolic function with EF of 45-50%. Moderate CLVH. Grade I diastolic dysfunction of the left ventricular myocardium. No evidence of pericardial effusion.    Hyperlipidemia     Hypothyroidism     Infection of skin and subcutaneous tissue     Infection of skin AND/OR subcutaneous tissue    Skin cancer     Tinnitus     Type 2 diabetes mellitus (HCC)     Urinary tract infectious disease      Past Surgical History:   Procedure Laterality Date    CARDIAC CATHETERIZATION  10/23/2013    Cardiac cath 93198 (1) - Multivessel coronary artery disease. Moderate AV stenosis. Mild aortic valve regurgitation.    CHOLECYSTECTOMY      CORONARY ARTERY BYPASS GRAFT      with aortic valve replacement    ENDOSCOPY  01/25/2010    Colon endoscopy 55915 (1) - internal external hemorrhoids. 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;  Surgeon: Nicola Kaiser MD;  Location: [facility];  Service: General;  Laterality: N/A;     Family History   Problem Relation Age of Onset    Breast cancer Mother     Diabetes Sister     Leukemia Brother     Diabetes Brother     Diabetes Sister        No  Known Allergies  Social History     Socioeconomic History    Marital status:    Tobacco Use    Smoking status: Former    Smokeless tobacco: Former   Vaping Use    Vaping Use: Never used   Substance and Sexual Activity    Alcohol use: Yes     Comment: social    Drug use: No    Sexual activity: Defer     Comment:      Current Medications:  Prior to Admission medications    Medication Sig Start Date End Date Taking? Authorizing Provider   Acetaminophen (TYLENOL ARTHRITIS PAIN PO) Take  by mouth As Needed.   Yes Rhona Pennington MD   Alogliptin Benzoate 25 MG tablet Take  by mouth Daily.   Yes Rhona Pennington MD   aspirin 81 MG EC tablet Take 1 tablet by mouth Daily. 2/13/21  Yes Oral Anguiano MD   atorvastatin (LIPITOR) 80 MG tablet Take 40 mg by mouth Daily.   Yes Emergency, Nurse Epic, RN   Ginger, Zingiber officinalis, (Ginger Root) 550 MG capsule Take  by mouth Daily.   Yes Rhona Pennington MD   glipizide (GLUCOTROL) 10 MG tablet Take 10 mg by mouth 2 (Two) Times a Day Before Meals. Take 20 mg two times daily   Yes Rhona Pennington MD   levothyroxine (SYNTHROID, LEVOTHROID) 25 MCG tablet Take 25 mcg by mouth Daily. 10/25/13  Yes Rhona Pennington MD   loratadine (CLARITIN) 10 MG tablet Take 10 mg by mouth Daily.   Yes Rhona Pennington MD   metFORMIN (GLUCOPHAGE) 1000 MG tablet Take 1,000 mg by mouth 2 (Two) Times a Day With Meals.   Yes Rhona Pennington MD   multivitamin (MULTIVITAMIN PO) Take 1 tablet by mouth Daily. 2/13/21  Yes Oral Anguiano MD   omeprazole (priLOSEC) 20 MG capsule Take 20 mg by mouth Daily.   Yes Rhona Pennington MD   Saw Palmetto 450 MG capsule Take  by mouth 2 (two) times a day. 3 tabs   Yes Rhona Pennington MD   tamsulosin (FLOMAX) 0.4 MG capsule 24 hr capsule Take 1 capsule by mouth Every Night. Take 2 po Qhs   Yes Rhona Pennington MD   traMADol (ULTRAM) 50 MG tablet Take 1 tablet by mouth Every 6 (Six) Hours As Needed for  "Moderate Pain. 4/1/21   Nicola Kaiser MD     Review of Systems  Review of Systems   Constitutional: Negative for unexpected weight change.   HENT: Negative for trouble swallowing.    Gastrointestinal: Positive for abdominal pain. Negative for abdominal distention, anal bleeding, blood in stool, constipation, diarrhea, nausea, rectal pain and vomiting.          Objective    /62 (BP Location: Right arm, Patient Position: Sitting, Cuff Size: Large Adult)   Pulse 82   Ht 188 cm (74\")   Wt 92.9 kg (204 lb 12.8 oz)   SpO2 97%   BMI 26.29 kg/m²   Physical Exam  Vitals and nursing note reviewed.   Constitutional:       General: He is not in acute distress.     Appearance: He is well-developed.   HENT:      Head: Normocephalic and atraumatic.   Eyes:      Pupils: Pupils are equal, round, and reactive to light.   Cardiovascular:      Rate and Rhythm: Normal rate and regular rhythm.      Heart sounds: Normal heart sounds.   Pulmonary:      Effort: Pulmonary effort is normal.      Breath sounds: Normal breath sounds.   Abdominal:      General: Bowel sounds are normal. There is no distension or abdominal bruit.      Palpations: Abdomen is soft. Abdomen is not rigid. There is no shifting dullness or mass.      Tenderness: There is abdominal tenderness. There is no guarding or rebound.      Hernia: No hernia is present. There is no hernia in the ventral area.   Musculoskeletal:         General: Normal range of motion.      Cervical back: Normal range of motion.   Skin:     General: Skin is warm and dry.   Neurological:      Mental Status: He is alert and oriented to person, place, and time.   Psychiatric:         Behavior: Behavior normal.         Thought Content: Thought content normal.         Judgment: Judgment normal.       Assessment & Plan      1. Occult blood positive stool    2. Encounter for screening for malignant neoplasm of colon    .   Diagnoses and all orders for this visit:    1. Occult blood " positive stool (Primary)  -     Case Request; Standing  -     dextrose 5 % and sodium chloride 0.45 % infusion  -     Case Request    2. Encounter for screening for malignant neoplasm of colon  -     Case Request; Standing  -     dextrose 5 % and sodium chloride 0.45 % infusion  -     Case Request    Other orders  -     Follow Anesthesia Guidelines / Protocol; Future  -     Obtain Informed Consent; Future  -     Obtain Informed Consent; Standing  -     POC Glucose Once; Standing        Orders placed during this encounter include:  Orders Placed This Encounter   Procedures    Obtain Informed Consent     Standing Status:   Future     Order Specific Question:   Informed Consent Given For     Answer:   COLONOSCOPY       Medications prescribed:  No orders of the defined types were placed in this encounter.      Requested Prescriptions      No prescriptions requested or ordered in this encounter       Review and/or summary of lab tests, radiology, procedures, medications. Review and summary of old records and obtaining of history. The risks and benefits of my recommendations, as well as other treatment options were discussed with the patient today. Questions were answered.    Follow-up: Return in 1 month (on 3/8/2023), or if symptoms worsen or fail to improve.     COLONOSCOPY (N/A)      This document has been electronically signed by Alfie Vance PA-C on February 21, 2023 19:28 CST      Results for orders placed or performed during the hospital encounter of 04/01/21   Tissue Pathology Exam    Specimen: Buttock, Left; Tissue   Result Value Ref Range    Case Report       Surgical Pathology Report                         Case: [id number]                                  Authorizing Provider:  Nicola Kaiser MD      Collected:           04/01/2021 01:07 PM          Ordering Location:     [facility]             Received:            04/01/2021 01:27 PM                                 [facility]                                                               Pathologist:           Glenn Gardner MD                                                           Specimen:    Buttock, Left, LEFT GLUTEAL CLEFT ABSCESS                                                  Final Diagnosis       See Scanned Report       POC Glucose Once    Specimen: Blood   Result Value Ref Range    Glucose 225 (H) 70 - 130 mg/dL   Results for orders placed or performed in visit on 03/29/21   COVID-19, [facility] IN-HOUSE, NP SWAB IN TRANSPORT MEDIA 8-10 HR TAT - Swab, Nasopharynx    Specimen: Nasopharynx; Swab   Result Value Ref Range    COVID19 Not Detected Not Detected - Ref. Range   Results for orders placed or performed during the hospital encounter of 02/12/21   Wound Culture - Wound, Buttock, Left    Specimen: Buttock, Left; Wound   Result Value Ref Range    Wound Culture Light growth (2+) Staphylococcus aureus, MRSA (A)     Gram Stain Rare (1+) WBCs seen     Gram Stain Few (2+) Gram positive cocci in clusters        Susceptibility    Staphylococcus aureus, MRSA - KPAIL*     Clindamycin  Susceptible ug/ml     Inducible Clindamycin Resistance  Negative ug/ml     Erythromycin  Resistant ug/ml     Oxacillin  Resistant ug/ml     Penicillin G  Resistant ug/ml     Rifampin  Susceptible ug/ml     Tetracycline  Susceptible ug/ml     Trimethoprim + Sulfamethoxazole  Susceptible ug/ml     Vancomycin  Susceptible ug/ml     * This isolate does not demonstrate inducible clindamycin resistance in vitro.     CBC (No Diff)    Specimen: Blood   Result Value Ref Range    WBC 6.90 3.40 - 10.80 10*3/mm3    RBC 4.26 4.14 - 5.80 10*6/mm3    Hemoglobin 13.6 13.0 - 17.7 g/dL    Hematocrit 39.2 37.5 - 51.0 %    MCV 92.0 79.0 - 97.0 fL    MCH 31.9 26.6 - 33.0 pg    MCHC 34.7 31.5 - 35.7 g/dL    RDW 13.6 12.3 - 15.4 %    RDW-SD 45.5 37.0 - 54.0 fl    MPV 9.6 6.0 - 12.0 fL    Platelets 143 140 - 450 10*3/mm3   CBC (No Diff)    Specimen: Blood   Result Value Ref Range    WBC 8.03 3.40  - 10.80 10*3/mm3    RBC 4.56 4.14 - 5.80 10*6/mm3    Hemoglobin 14.4 13.0 - 17.7 g/dL    Hematocrit 41.3 37.5 - 51.0 %    MCV 90.6 79.0 - 97.0 fL    MCH 31.6 26.6 - 33.0 pg    MCHC 34.9 31.5 - 35.7 g/dL    RDW 13.2 12.3 - 15.4 %    RDW-SD 42.8 37.0 - 54.0 fl    MPV 9.4 6.0 - 12.0 fL    Platelets 203 140 - 450 10*3/mm3   CBC (No Diff)    Specimen: Blood   Result Value Ref Range    WBC 11.33 (H) 3.40 - 10.80 10*3/mm3    RBC 5.03 4.14 - 5.80 10*6/mm3    Hemoglobin 16.0 13.0 - 17.7 g/dL    Hematocrit 45.5 37.5 - 51.0 %    MCV 90.5 79.0 - 97.0 fL    MCH 31.8 26.6 - 33.0 pg    MCHC 35.2 31.5 - 35.7 g/dL    RDW 12.9 12.3 - 15.4 %    RDW-SD 41.8 37.0 - 54.0 fl    MPV 9.5 6.0 - 12.0 fL    Platelets 273 140 - 450 10*3/mm3   CBC (No Diff)    Specimen: Blood   Result Value Ref Range    WBC 8.73 3.40 - 10.80 10*3/mm3    RBC 4.46 4.14 - 5.80 10*6/mm3    Hemoglobin 14.2 13.0 - 17.7 g/dL    Hematocrit 40.1 37.5 - 51.0 %    MCV 89.9 79.0 - 97.0 fL    MCH 31.8 26.6 - 33.0 pg    MCHC 35.4 31.5 - 35.7 g/dL    RDW 12.4 12.3 - 15.4 %    RDW-SD 40.3 37.0 - 54.0 fl    MPV 9.6 6.0 - 12.0 fL    Platelets 366 140 - 450 10*3/mm3   CBC (No Diff)    Specimen: Blood   Result Value Ref Range    WBC 8.71 3.40 - 10.80 10*3/mm3    RBC 4.67 4.14 - 5.80 10*6/mm3    Hemoglobin 15.1 13.0 - 17.7 g/dL    Hematocrit 42.4 37.5 - 51.0 %    MCV 90.8 79.0 - 97.0 fL    MCH 32.3 26.6 - 33.0 pg    MCHC 35.6 31.5 - 35.7 g/dL    RDW 12.4 12.3 - 15.4 %    RDW-SD 40.5 37.0 - 54.0 fl    MPV 9.8 6.0 - 12.0 fL    Platelets 429 140 - 450 10*3/mm3   POC Glucose Once    Specimen: Blood   Result Value Ref Range    Glucose 178 (H) 70 - 130 mg/dL   POC Glucose Once    Specimen: Blood   Result Value Ref Range    Glucose 321 (H) 70 - 130 mg/dL   POC Glucose Once    Specimen: Blood   Result Value Ref Range    Glucose 226 (H) 70 - 130 mg/dL   POC Glucose Once    Specimen: Blood   Result Value Ref Range    Glucose 316 (H) 70 - 130 mg/dL   POC Glucose Once    Specimen: Blood    Result Value Ref Range    Glucose 168 (H) 70 - 130 mg/dL   POC Glucose Once    Specimen: Blood   Result Value Ref Range    Glucose 287 (H) 70 - 130 mg/dL   POC Glucose Once    Specimen: Blood   Result Value Ref Range    Glucose 284 (H) 70 - 130 mg/dL   POC Glucose Once    Specimen: Blood   Result Value Ref Range    Glucose 405 (C) 70 - 130 mg/dL   POC Glucose Once    Specimen: Blood   Result Value Ref Range    Glucose 255 (H) 70 - 130 mg/dL   POC Glucose Once    Specimen: Blood   Result Value Ref Range    Glucose 195 (H) 70 - 130 mg/dL   POC Glucose Once    Specimen: Blood   Result Value Ref Range    Glucose 315 (H) 70 - 130 mg/dL   POC Glucose Once    Specimen: Blood   Result Value Ref Range    Glucose 217 (H) 70 - 130 mg/dL   POC Glucose Once    Specimen: Blood   Result Value Ref Range    Glucose 192 (H) 70 - 130 mg/dL   POC Glucose Once    Specimen: Blood   Result Value Ref Range    Glucose 302 (H) 70 - 130 mg/dL   POC Glucose Once    Specimen: Blood   Result Value Ref Range    Glucose 363 (H) 70 - 130 mg/dL   POC Glucose Once    Specimen: Blood   Result Value Ref Range    Glucose 232 (H) 70 - 130 mg/dL   POC Glucose Once    Specimen: Blood   Result Value Ref Range    Glucose 198 (H) 70 - 130 mg/dL   POC Glucose Once    Specimen: Blood   Result Value Ref Range    Glucose 353 (H) 70 - 130 mg/dL   POC Glucose Once    Specimen: Blood   Result Value Ref Range    Glucose 310 (H) 70 - 130 mg/dL   POC Glucose Once    Specimen: Blood   Result Value Ref Range    Glucose 292 (H) 70 - 130 mg/dL   POC Glucose Once    Specimen: Blood   Result Value Ref Range    Glucose 225 (H) 70 - 130 mg/dL   POC Glucose Once    Specimen: Blood   Result Value Ref Range    Glucose 360 (H) 70 - 130 mg/dL   POC Glucose Once    Specimen: Blood   Result Value Ref Range    Glucose 386 (H) 70 - 130 mg/dL   POC Glucose Once    Specimen: Blood   Result Value Ref Range    Glucose 316 (H) 70 - 130 mg/dL   POC Glucose Once    Specimen: Blood   Result  Value Ref Range    Glucose 168 (H) 70 - 130 mg/dL   POC Glucose Once    Specimen: Blood   Result Value Ref Range    Glucose 365 (H) 70 - 130 mg/dL   POC Glucose Once    Specimen: Blood   Result Value Ref Range    Glucose 340 (H) 70 - 130 mg/dL   POC Glucose Once    Specimen: Blood   Result Value Ref Range    Glucose 231 (H) 70 - 130 mg/dL   POC Glucose Once    Specimen: Blood   Result Value Ref Range    Glucose 113 70 - 130 mg/dL   POC Glucose Once    Specimen: Blood   Result Value Ref Range    Glucose 272 (H) 70 - 130 mg/dL   POC Glucose Once    Specimen: Blood   Result Value Ref Range    Glucose 318 (H) 70 - 130 mg/dL   POC Glucose Once    Specimen: Blood   Result Value Ref Range    Glucose 338 (H) 70 - 130 mg/dL   POC Glucose Once    Specimen: Blood   Result Value Ref Range    Glucose 123 70 - 130 mg/dL   POC Glucose Once    Specimen: Blood   Result Value Ref Range    Glucose 323 (H) 70 - 130 mg/dL   POC Glucose Once    Specimen: Blood   Result Value Ref Range    Glucose 270 (H) 70 - 130 mg/dL   POC Glucose Once    Specimen: Blood   Result Value Ref Range    Glucose 346 (H) 70 - 130 mg/dL   POC Glucose Once    Specimen: Blood   Result Value Ref Range    Glucose 146 (H) 70 - 130 mg/dL   POC Glucose Once    Specimen: Blood   Result Value Ref Range    Glucose 320 (H) 70 - 130 mg/dL     *Note: Due to a large number of results and/or encounters for the requested time period, some results have not been displayed. A complete set of results can be found in Results Review.

## 2023-03-07 ENCOUNTER — ANESTHESIA EVENT (OUTPATIENT)
Dept: GASTROENTEROLOGY | Facility: HOSPITAL | Age: 78
End: 2023-03-07
Payer: OTHER GOVERNMENT

## 2023-03-07 ENCOUNTER — ANESTHESIA (OUTPATIENT)
Dept: GASTROENTEROLOGY | Facility: HOSPITAL | Age: 78
End: 2023-03-07
Payer: OTHER GOVERNMENT

## 2023-03-07 ENCOUNTER — HOSPITAL ENCOUNTER (OUTPATIENT)
Facility: HOSPITAL | Age: 78
Setting detail: HOSPITAL OUTPATIENT SURGERY
Discharge: HOME | End: 2023-03-07
Attending: INTERNAL MEDICINE | Admitting: INTERNAL MEDICINE
Payer: OTHER GOVERNMENT

## 2023-03-07 VITALS
BODY MASS INDEX: 26.29 KG/M2 | OXYGEN SATURATION: 98 % | DIASTOLIC BLOOD PRESSURE: 56 MMHG | SYSTOLIC BLOOD PRESSURE: 122 MMHG | HEART RATE: 73 BPM | HEIGHT: 73 IN | WEIGHT: 198.4 LBS | TEMPERATURE: 97 F | RESPIRATION RATE: 18 BRPM

## 2023-03-07 DIAGNOSIS — R19.5 OCCULT BLOOD POSITIVE STOOL: ICD-10-CM

## 2023-03-07 DIAGNOSIS — Z12.11 ENCOUNTER FOR SCREENING FOR MALIGNANT NEOPLASM OF COLON: ICD-10-CM

## 2023-03-07 LAB — GLUCOSE BLDC GLUCOMTR-MCNC: 146 MG/DL (ref 70–130)

## 2023-03-07 PROCEDURE — 88305 TISSUE EXAM BY PATHOLOGIST: CPT

## 2023-03-07 PROCEDURE — 82962 GLUCOSE BLOOD TEST: CPT

## 2023-03-07 PROCEDURE — 25010000002 PROPOFOL 10 MG/ML EMULSION: Performed by: NURSE ANESTHETIST, CERTIFIED REGISTERED

## 2023-03-07 PROCEDURE — 45385 COLONOSCOPY W/LESION REMOVAL: CPT | Performed by: INTERNAL MEDICINE

## 2023-03-07 RX ORDER — PROPOFOL 10 MG/ML
VIAL (ML) INTRAVENOUS AS NEEDED
Status: DISCONTINUED | OUTPATIENT
Start: 2023-03-07 | End: 2023-03-07 | Stop reason: SURG

## 2023-03-07 RX ORDER — MEPERIDINE HYDROCHLORIDE 25 MG/ML
12.5 INJECTION INTRAMUSCULAR; INTRAVENOUS; SUBCUTANEOUS
Status: DISCONTINUED | OUTPATIENT
Start: 2023-03-07 | End: 2023-03-07 | Stop reason: HOSPADM

## 2023-03-07 RX ORDER — PROMETHAZINE HYDROCHLORIDE 25 MG/1
25 SUPPOSITORY RECTAL ONCE AS NEEDED
Status: DISCONTINUED | OUTPATIENT
Start: 2023-03-07 | End: 2023-03-07 | Stop reason: HOSPADM

## 2023-03-07 RX ORDER — DEXTROSE AND SODIUM CHLORIDE 5; .45 G/100ML; G/100ML
30 INJECTION, SOLUTION INTRAVENOUS CONTINUOUS PRN
Status: DISCONTINUED | OUTPATIENT
Start: 2023-03-07 | End: 2023-03-07 | Stop reason: HOSPADM

## 2023-03-07 RX ORDER — PROMETHAZINE HYDROCHLORIDE 25 MG/1
25 TABLET ORAL ONCE AS NEEDED
Status: DISCONTINUED | OUTPATIENT
Start: 2023-03-07 | End: 2023-03-07 | Stop reason: HOSPADM

## 2023-03-07 RX ORDER — ONDANSETRON 2 MG/ML
4 INJECTION INTRAMUSCULAR; INTRAVENOUS ONCE AS NEEDED
Status: DISCONTINUED | OUTPATIENT
Start: 2023-03-07 | End: 2023-03-07 | Stop reason: HOSPADM

## 2023-03-07 RX ORDER — SODIUM CHLORIDE, SODIUM GLUCONATE, SODIUM ACETATE, POTASSIUM CHLORIDE, AND MAGNESIUM CHLORIDE 526; 502; 368; 37; 30 MG/100ML; MG/100ML; MG/100ML; MG/100ML; MG/100ML
INJECTION, SOLUTION INTRAVENOUS CONTINUOUS PRN
Status: DISCONTINUED | OUTPATIENT
Start: 2023-03-07 | End: 2023-03-07 | Stop reason: SURG

## 2023-03-07 RX ORDER — LIDOCAINE HYDROCHLORIDE 20 MG/ML
INJECTION, SOLUTION INTRAVENOUS AS NEEDED
Status: DISCONTINUED | OUTPATIENT
Start: 2023-03-07 | End: 2023-03-07 | Stop reason: SURG

## 2023-03-07 RX ADMIN — PROPOFOL 40 MG: 10 INJECTION, EMULSION INTRAVENOUS at 12:36

## 2023-03-07 RX ADMIN — DEXTROSE AND SODIUM CHLORIDE 30 ML/HR: 5; 450 INJECTION, SOLUTION INTRAVENOUS at 11:37

## 2023-03-07 RX ADMIN — PROPOFOL 70 MG: 10 INJECTION, EMULSION INTRAVENOUS at 12:22

## 2023-03-07 RX ADMIN — PROPOFOL 20 MG: 10 INJECTION, EMULSION INTRAVENOUS at 12:31

## 2023-03-07 RX ADMIN — LIDOCAINE HYDROCHLORIDE 40 MG: 20 INJECTION, SOLUTION INTRAVENOUS at 12:22

## 2023-03-07 RX ADMIN — PROPOFOL 20 MG: 10 INJECTION, EMULSION INTRAVENOUS at 12:39

## 2023-03-07 RX ADMIN — PROPOFOL 10 MG: 10 INJECTION, EMULSION INTRAVENOUS at 12:42

## 2023-03-07 RX ADMIN — SODIUM CHLORIDE, SODIUM GLUCONATE, SODIUM ACETATE, POTASSIUM CHLORIDE, AND MAGNESIUM CHLORIDE: 526; 502; 368; 37; 30 INJECTION, SOLUTION INTRAVENOUS at 12:19

## 2023-03-07 RX ADMIN — PROPOFOL 10 MG: 10 INJECTION, EMULSION INTRAVENOUS at 12:25

## 2023-03-07 RX ADMIN — PROPOFOL 20 MG: 10 INJECTION, EMULSION INTRAVENOUS at 12:27

## 2023-03-07 RX ADMIN — PROPOFOL 10 MG: 10 INJECTION, EMULSION INTRAVENOUS at 12:34

## 2023-03-07 NOTE — ANESTHESIA PREPROCEDURE EVALUATION
Anesthesia Evaluation     Patient summary reviewed and Nursing notes reviewed   no history of anesthetic complications:  NPO Solid Status: > 8 hours  NPO Liquid Status: > 4 hours           Airway   Mallampati: II  TM distance: >3 FB  Neck ROM: full  possible difficult intubation  Comment: Trimmed beard.  Dental    (+) edentulous    Pulmonary     breath sounds clear to auscultation  (+) pneumonia , a smoker Former, decreased breath sounds,     ROS comment: IMPRESSION:     1.  Persistent bilateral vague peripheral patchy infiltrates  compatible with the patient's given history of Covid 19. No  appreciable changes identified.     Commonly reported imaging features of COVID-19 pneumonia are  present. Other processes such as influenza pneumonia and  organizing pneumonia, as can be seen with drug toxicity and  connective tissue disease, can cause similar imaging pattern.  (PneTyp)           Electronically signed by:  Neisha Call MD  2/9/2021 3:03 PM CST  Cardiovascular   Exercise tolerance: good (4-7 METS)    ECG reviewed  Rhythm: irregular  Rate: normal    (+) valvular problems/murmurs murmur, CAD, CABG (5 vessel bypass and aortic valve replacement 2013. No problems since), murmur (Grade III/VI systolic), hyperlipidemia,     ROS comment: Normal sinus rhythm  Left bundle branch block  Abnormal ECG  When compared with ECG of 14-NOV-2016 10:25,  No significant change was found  Confirmed by NEISHA HALL ([id number]) on 2/5/2021 4:16:16 PM    Neuro/Psych- negative ROS  GI/Hepatic/Renal/Endo    (+)  GERD well controlled,  diabetes mellitus type 2, thyroid problem hypothyroidism    Musculoskeletal     Abdominal    Substance History - negative use     OB/GYN negative ob/gyn ROS         Other   arthritis,    history of cancer                    Anesthesia Plan    ASA 3     general   total IV anesthesia  intravenous induction     Anesthetic plan, risks, benefits, and alternatives have been provided, discussed and informed consent  has been obtained with: patient and spouse/significant other.

## 2023-03-07 NOTE — ANESTHESIA POSTPROCEDURE EVALUATION
Patient: Alfie Joseph    Procedure Summary     Date: 03/07/23 Room / Location: [facility] ENDOSCOPY 1 / [facility] ENDOSCOPY    Anesthesia Start: 1219 Anesthesia Stop: 1244    Procedure: COLONOSCOPY Diagnosis:       Occult blood positive stool      Encounter for screening for malignant neoplasm of colon      (Occult blood positive stool [R19.5])      (Encounter for screening for malignant neoplasm of colon [Z12.11])    Surgeons: Guille Morgan MD Provider: Austen Villafana CRNA    Anesthesia Type: general ASA Status: 3          Anesthesia Type: general    Vitals  No vitals data found for the desired time range.          Post Anesthesia Care and Evaluation    Patient location during evaluation: PHASE II  Patient participation: complete - patient participated  Level of consciousness: sleepy but conscious  Pain score: 0  Pain management: adequate    Airway patency: patent  Anesthetic complications: No anesthetic complications  PONV Status: none  Cardiovascular status: stable, acceptable and hemodynamically stable  Respiratory status: acceptable, unassisted and room air  Hydration status: acceptable      
None known

## 2023-03-09 LAB — REF LAB TEST METHOD: NORMAL

## 2023-03-16 ENCOUNTER — OFFICE VISIT (OUTPATIENT)
Dept: GASTROENTEROLOGY | Facility: CLINIC | Age: 78
End: 2023-03-16
Payer: MEDICARE

## 2023-03-16 VITALS
SYSTOLIC BLOOD PRESSURE: 138 MMHG | OXYGEN SATURATION: 97 % | DIASTOLIC BLOOD PRESSURE: 72 MMHG | HEART RATE: 77 BPM | HEIGHT: 73 IN | WEIGHT: 209.4 LBS | BODY MASS INDEX: 27.75 KG/M2

## 2023-03-16 DIAGNOSIS — K57.90 DIVERTICULOSIS: ICD-10-CM

## 2023-03-16 DIAGNOSIS — D36.9 TUBULAR ADENOMA: Primary | ICD-10-CM

## 2023-03-16 PROCEDURE — 1160F RVW MEDS BY RX/DR IN RCRD: CPT | Performed by: PHYSICIAN ASSISTANT

## 2023-03-16 PROCEDURE — 1159F MED LIST DOCD IN RCRD: CPT | Performed by: PHYSICIAN ASSISTANT

## 2023-03-16 PROCEDURE — 99213 OFFICE O/P EST LOW 20 MIN: CPT | Performed by: PHYSICIAN ASSISTANT

## 2023-03-16 NOTE — PROGRESS NOTES
Chief Complaint   Patient presents with   • Follow-up     After EGD/Colon   • Rectal hemorrhage       ENDO PROCEDURE ORDERED:    Subjective    Alfie Joseph is a 78 y.o. male. he is here today for follow-up.    History of Present Illness    Patient seen on a recheck of his rectal bleeding. He states he has not had any more bleeding since his last visit. Denied abdominal pain. GERD is doing well on the Prilosec. Denied nausea, vomiting, and dysphagia. Bowels are moving without blood or mucus. Weight is up 4.5 pounds since last visit. Patient underwent colonoscopy 03/07/2023, showed multiple polyps in the sigmoid and descending colon. These were tubular adenoma, no high grade dysplasia. One polyp was 1.8 x 1.0 cm in greatest dimension. The other polyp was 1.0 x 7 mm in greatest dimension. This also showed internal and external hemorrhoids. Was recommended to have a repeat colonoscopy in 1 year given the large size of these polyps.    ASSESSMENT/PLAN: Patient with large adenomatous polyps as noted above. Encouraged high fiber diet. I have recommended repeat colonoscopy at 1 year. He did have some mild diverticulum in the photographs, not mentioned in the report. He was instructed on a high fiber/diverticular diet. We will plan follow-up at 1 year, sooner if needed, further pending clinical course, and the results of the above.       The following portions of the patient's history were reviewed and updated as appropriate:   Past Medical History:   Diagnosis Date   • Acute bronchitis    • Allergic rhinitis    • Aortic valve disorder     Aortic valve disorder - AVR   • Aortic valve sclerosis    • Arthritis    • Benign prostatic hyperplasia    • Coronary atherosclerosis of native coronary artery     Coronary atherosclerosis of native coronary artery - CABG   • Diabetes (HCC)    • Exanthematous disorder    • GERD (gastroesophageal reflux disease)    • Hemorrhoids     Hemorrhoids - internal & external   • History of  echocardiogram 10/26/2015    Echocardiogram W/ color flow 76205 (2) - Limited 2D echocardiogram. Normal prosthetic AV.   • History of echocardiogram 12/09/2013    Echocardiogram W/O color flow 70682 (3) - Mildly depressed LV systolic function with EF of 45-50%. Moderate CLVH. Grade I diastolic dysfunction of the left ventricular myocardium. No evidence of pericardial effusion.   • Hyperlipidemia    • Hypothyroidism    • Infection of skin and subcutaneous tissue     Infection of skin AND/OR subcutaneous tissue   • Skin cancer    • Tinnitus    • Type 2 diabetes mellitus (HCC)    • Urinary tract infectious disease      Past Surgical History:   Procedure Laterality Date   • CARDIAC CATHETERIZATION  10/23/2013    Cardiac cath 09479 (1) - Multivessel coronary artery disease. Moderate AV stenosis. Mild aortic valve regurgitation.   • CHOLECYSTECTOMY     • COLONOSCOPY N/A 3/7/2023    Procedure: COLONOSCOPY;  Surgeon: Guille Morgan MD;  Location: [facility] ENDOSCOPY;  Service: Gastroenterology;  Laterality: N/A;   • CORONARY ARTERY BYPASS GRAFT      with aortic valve replacement   • ENDOSCOPY  01/25/2010    Colon endoscopy 05170 (1) - internal external hemorrhoids. 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;  Surgeon: Nicola Kaiser MD;  Location: [facility] OR;  Service: General;  Laterality: N/A;     Family History   Problem Relation Age of Onset   • Breast cancer Mother    • Diabetes Sister    • Leukemia Brother    • Diabetes Brother    • Diabetes Sister        No Known Allergies  Social History     Socioeconomic History   • Marital status:    Tobacco Use   • Smoking status: Former   • Smokeless tobacco: Former   Vaping Use   • Vaping Use: Never used   Substance and Sexual Activity   • Alcohol use: Yes     Comment: social   • Drug use: No   • Sexual activity: Defer      "Comment:      Current Medications:  Prior to Admission medications    Medication Sig Start Date End Date Taking? Authorizing Provider   Acetaminophen (TYLENOL ARTHRITIS PAIN PO) Take  by mouth As Needed.   Yes Rhona Pennington MD   Alogliptin Benzoate 25 MG tablet Take  by mouth Daily.   Yes Rhona Pennington MD   aspirin 81 MG EC tablet Take 1 tablet by mouth Daily. 2/13/21  Yes Oral Anguiano MD   atorvastatin (LIPITOR) 80 MG tablet Take 40 mg by mouth Daily.   Yes Emergency, Nurse Alvin, RN   empagliflozin (JARDIANCE) 25 MG tablet tablet Take  by mouth Daily.   Yes Rhona Pennington MD   Ginger, Zingiber officinalis, (Ginger Root) 550 MG capsule Take  by mouth Daily.   Yes Rhona Pennnigton MD   glipizide (GLUCOTROL) 10 MG tablet Take 1 tablet by mouth 2 (Two) Times a Day Before Meals. Take 20 mg two times daily   Yes Rhona Pennington MD   levothyroxine (SYNTHROID, LEVOTHROID) 25 MCG tablet Take 1 tablet by mouth Daily. 10/25/13  Yes Rhona Pennington MD   loratadine (CLARITIN) 10 MG tablet Take 1 tablet by mouth Daily.   Yes Rhona Pennington MD   metFORMIN (GLUCOPHAGE) 1000 MG tablet Take 1 tablet by mouth 2 (Two) Times a Day With Meals.   Yes Rhona Pennington MD   multivitamin (MULTIVITAMIN PO) Take 1 tablet by mouth Daily. 2/13/21  Yes Oral Anguiano MD   omeprazole (priLOSEC) 20 MG capsule Take 1 capsule by mouth Daily.   Yes Rhona Pennington MD   Saw Palmetto 450 MG capsule Take  by mouth 2 (two) times a day. 3 tabs   Yes Rhona Pennington MD   tamsulosin (FLOMAX) 0.4 MG capsule 24 hr capsule Take 1 capsule by mouth Every Night. Take 2 po Qhs   Yes Rhona Pennington MD     Review of Systems  Review of Systems       Objective    /72 (BP Location: Right arm, Patient Position: Sitting, Cuff Size: Large Adult)   Pulse 77   Ht 185.4 cm (73\")   Wt 95 kg (209 lb 6.4 oz)   SpO2 97%   BMI 27.63 kg/m²   Physical Exam  Vitals and nursing note " reviewed.   Constitutional:       General: He is not in acute distress.     Appearance: He is well-developed.   HENT:      Head: Normocephalic and atraumatic.   Eyes:      Pupils: Pupils are equal, round, and reactive to light.   Cardiovascular:      Rate and Rhythm: Normal rate and regular rhythm.      Heart sounds: Normal heart sounds.   Pulmonary:      Effort: Pulmonary effort is normal.      Breath sounds: Normal breath sounds.   Abdominal:      General: Bowel sounds are normal. There is no distension or abdominal bruit.      Palpations: Abdomen is soft. Abdomen is not rigid. There is no shifting dullness or mass.      Tenderness: There is abdominal tenderness. There is no guarding or rebound.      Hernia: No hernia is present. There is no hernia in the ventral area.   Musculoskeletal:         General: Normal range of motion.      Cervical back: Normal range of motion.   Skin:     General: Skin is warm and dry.   Neurological:      Mental Status: He is alert and oriented to person, place, and time.   Psychiatric:         Behavior: Behavior normal.         Thought Content: Thought content normal.         Judgment: Judgment normal.       Assessment & Plan      1. Tubular adenoma    2. Diverticulosis    .   Diagnoses and all orders for this visit:    1. Tubular adenoma (Primary)    2. Diverticulosis        Orders placed during this encounter include:  No orders of the defined types were placed in this encounter.      Medications prescribed:  No orders of the defined types were placed in this encounter.      Requested Prescriptions      No prescriptions requested or ordered in this encounter       Review and/or summary of lab tests, radiology, procedures, medications. Review and summary of old records and obtaining of history. The risks and benefits of my recommendations, as well as other treatment options were discussed with the patient today. Questions were answered.    Follow-up: Return in about 1 year (around  "3/16/2024), or if symptoms worsen or fail to improve.     * Surgery not found *      This document has been electronically signed by Krystina Vance PA-C on 2023 18:24 CDT      Results for orders placed or performed during the hospital encounter of 23   TISSUE EXAM, [facility])    Specimen: A: Large Intestine, Sigmoid Colon; Tissue    B: Large Intestine, Transverse Colon; Tissue   Result Value Ref Range    Reference Lab Report       [facility]  [address] [place] [address]  Phone: [phone] or [phone]  Fax: [phone]  Clifford Cevallos M.D., Medical Director    PATIENT NAME                           LABORATORY NO.  KRYSTINA DURBIN.                   [id number]  [id number]                         AGE              SEX  SSN           CLIENT REF #  [facility]           78      1945      [id number]   [id number]    [place]                       REQUESTING M.D.     ATTENDING M.D.     [address] [place] [address]             DATE COLLECTED      DATE RECEIVED      DATE REPORTED  2023    DIAGNOSIS:  A.   SIGMOID COLON POLYP:  Tubular adenoma  No high grade dysplasia identified  B.   TRANSVERSE COLON POLYP:  Tubular adenoma  No high grade dysplasia identified    CLINICAL HISTORY:  Occult blood positive stool, encounter  for screening for malignant neoplasm of  colon    SPECIMENS RECEIVED:  A.  SIGMOID COLON POLYP  B.  TRANSVERSE COLON POLYP    MICROSCOPIC DESCRIPTION:  Tissue blocks are prepared and slides are examined microscopically on all  specimens. See diagnosis for details.    Professional interpretation rendered by Rosa Arroyo D.O., F.C.A.P. at  [facility], [employer], [address] [place] [address].    GROSS DESCRIPTION:  A.  Labeled as \"sigmoid colon polyp\", consisting of " "multiple pieces of tan soft  tissue (possible vegetative matter).  Specimen is filtered, measuring 1.8 x  1.0 x 0.2 cm, submitted entirely in 1 cassette.  SOG  B.  Labeled as \"transverse colon polyp\", consisting of 1 sizable polyp and  multiple pieces of tan soft tissue, to be described separately.  Polyp is tan-gray, measuring 1.0 x 0.7 x 0.6 cm, serially sectioned and  submitted entirely in cassettes B1-B2.  All other remaining tissue is filtered, measuring 0.7 x 0.4 x 0.2 cm in  aggregate and is submitted entirely in  B3.    REVIEWED, DIAGNOSED AND ELECTRONICALLY  SIGNED BY:    Rosa Arroyo D.O., F.C.A.P.  CPT CODES:  88305x2     POC Glucose Once    Specimen: Blood   Result Value Ref Range    Glucose 146 (H) 70 - 130 mg/dL   Results for orders placed or performed during the hospital encounter of 04/01/21   Tissue Pathology Exam    Specimen: Buttock, Left; Tissue   Result Value Ref Range    Case Report       Surgical Pathology Report                         Case: [id number]                                  Authorizing Provider:  Nicola Kaiser MD      Collected:           04/01/2021 01:07 PM          Ordering Location:     [facility]             Received:            04/01/2021 01:27 PM                                 [place]                                                              Pathologist:           Glenn Gardner MD                                                           Specimen:    Buttock, Left, LEFT GLUTEAL CLEFT ABSCESS                                                  Final Diagnosis       See Scanned Report       POC Glucose Once    Specimen: Blood   Result Value Ref Range    Glucose 225 (H) 70 - 130 mg/dL   Results for orders placed or performed in visit on 03/29/21   COVID-19, [facility] IN-HOUSE, NP SWAB IN TRANSPORT MEDIA 8-10 HR TAT - Swab, Nasopharynx    Specimen: Nasopharynx; Swab   Result Value Ref Range    COVID19 Not Detected Not Detected - Ref. Range   Results for " orders placed or performed during the hospital encounter of 02/12/21   Wound Culture - Wound, Buttock, Left    Specimen: Buttock, Left; Wound   Result Value Ref Range    Wound Culture Light growth (2+) Staphylococcus aureus, MRSA (A)     Gram Stain Rare (1+) WBCs seen     Gram Stain Few (2+) Gram positive cocci in clusters        Susceptibility    Staphylococcus aureus, MRSA - KAPIL*     Clindamycin  Susceptible ug/ml     Inducible Clindamycin Resistance  Negative ug/ml     Erythromycin  Resistant ug/ml     Oxacillin  Resistant ug/ml     Penicillin G  Resistant ug/ml     Rifampin  Susceptible ug/ml     Tetracycline  Susceptible ug/ml     Trimethoprim + Sulfamethoxazole  Susceptible ug/ml     Vancomycin  Susceptible ug/ml     * This isolate does not demonstrate inducible clindamycin resistance in vitro.     CBC (No Diff)    Specimen: Blood   Result Value Ref Range    WBC 6.90 3.40 - 10.80 10*3/mm3    RBC 4.26 4.14 - 5.80 10*6/mm3    Hemoglobin 13.6 13.0 - 17.7 g/dL    Hematocrit 39.2 37.5 - 51.0 %    MCV 92.0 79.0 - 97.0 fL    MCH 31.9 26.6 - 33.0 pg    MCHC 34.7 31.5 - 35.7 g/dL    RDW 13.6 12.3 - 15.4 %    RDW-SD 45.5 37.0 - 54.0 fl    MPV 9.6 6.0 - 12.0 fL    Platelets 143 140 - 450 10*3/mm3   CBC (No Diff)    Specimen: Blood   Result Value Ref Range    WBC 8.03 3.40 - 10.80 10*3/mm3    RBC 4.56 4.14 - 5.80 10*6/mm3    Hemoglobin 14.4 13.0 - 17.7 g/dL    Hematocrit 41.3 37.5 - 51.0 %    MCV 90.6 79.0 - 97.0 fL    MCH 31.6 26.6 - 33.0 pg    MCHC 34.9 31.5 - 35.7 g/dL    RDW 13.2 12.3 - 15.4 %    RDW-SD 42.8 37.0 - 54.0 fl    MPV 9.4 6.0 - 12.0 fL    Platelets 203 140 - 450 10*3/mm3   CBC (No Diff)    Specimen: Blood   Result Value Ref Range    WBC 11.33 (H) 3.40 - 10.80 10*3/mm3    RBC 5.03 4.14 - 5.80 10*6/mm3    Hemoglobin 16.0 13.0 - 17.7 g/dL    Hematocrit 45.5 37.5 - 51.0 %    MCV 90.5 79.0 - 97.0 fL    MCH 31.8 26.6 - 33.0 pg    MCHC 35.2 31.5 - 35.7 g/dL    RDW 12.9 12.3 - 15.4 %    RDW-SD 41.8 37.0 - 54.0 fl     MPV 9.5 6.0 - 12.0 fL    Platelets 273 140 - 450 10*3/mm3   CBC (No Diff)    Specimen: Blood   Result Value Ref Range    WBC 8.73 3.40 - 10.80 10*3/mm3    RBC 4.46 4.14 - 5.80 10*6/mm3    Hemoglobin 14.2 13.0 - 17.7 g/dL    Hematocrit 40.1 37.5 - 51.0 %    MCV 89.9 79.0 - 97.0 fL    MCH 31.8 26.6 - 33.0 pg    MCHC 35.4 31.5 - 35.7 g/dL    RDW 12.4 12.3 - 15.4 %    RDW-SD 40.3 37.0 - 54.0 fl    MPV 9.6 6.0 - 12.0 fL    Platelets 366 140 - 450 10*3/mm3   CBC (No Diff)    Specimen: Blood   Result Value Ref Range    WBC 8.71 3.40 - 10.80 10*3/mm3    RBC 4.67 4.14 - 5.80 10*6/mm3    Hemoglobin 15.1 13.0 - 17.7 g/dL    Hematocrit 42.4 37.5 - 51.0 %    MCV 90.8 79.0 - 97.0 fL    MCH 32.3 26.6 - 33.0 pg    MCHC 35.6 31.5 - 35.7 g/dL    RDW 12.4 12.3 - 15.4 %    RDW-SD 40.5 37.0 - 54.0 fl    MPV 9.8 6.0 - 12.0 fL    Platelets 429 140 - 450 10*3/mm3   POC Glucose Once    Specimen: Blood   Result Value Ref Range    Glucose 178 (H) 70 - 130 mg/dL   POC Glucose Once    Specimen: Blood   Result Value Ref Range    Glucose 321 (H) 70 - 130 mg/dL   POC Glucose Once    Specimen: Blood   Result Value Ref Range    Glucose 226 (H) 70 - 130 mg/dL   POC Glucose Once    Specimen: Blood   Result Value Ref Range    Glucose 316 (H) 70 - 130 mg/dL   POC Glucose Once    Specimen: Blood   Result Value Ref Range    Glucose 168 (H) 70 - 130 mg/dL   POC Glucose Once    Specimen: Blood   Result Value Ref Range    Glucose 287 (H) 70 - 130 mg/dL   POC Glucose Once    Specimen: Blood   Result Value Ref Range    Glucose 284 (H) 70 - 130 mg/dL   POC Glucose Once    Specimen: Blood   Result Value Ref Range    Glucose 405 (C) 70 - 130 mg/dL   POC Glucose Once    Specimen: Blood   Result Value Ref Range    Glucose 255 (H) 70 - 130 mg/dL   POC Glucose Once    Specimen: Blood   Result Value Ref Range    Glucose 195 (H) 70 - 130 mg/dL   POC Glucose Once    Specimen: Blood   Result Value Ref Range    Glucose 315 (H) 70 - 130 mg/dL   POC Glucose Once     Specimen: Blood   Result Value Ref Range    Glucose 217 (H) 70 - 130 mg/dL   POC Glucose Once    Specimen: Blood   Result Value Ref Range    Glucose 192 (H) 70 - 130 mg/dL   POC Glucose Once    Specimen: Blood   Result Value Ref Range    Glucose 302 (H) 70 - 130 mg/dL   POC Glucose Once    Specimen: Blood   Result Value Ref Range    Glucose 363 (H) 70 - 130 mg/dL   POC Glucose Once    Specimen: Blood   Result Value Ref Range    Glucose 232 (H) 70 - 130 mg/dL   POC Glucose Once    Specimen: Blood   Result Value Ref Range    Glucose 198 (H) 70 - 130 mg/dL   POC Glucose Once    Specimen: Blood   Result Value Ref Range    Glucose 353 (H) 70 - 130 mg/dL   POC Glucose Once    Specimen: Blood   Result Value Ref Range    Glucose 310 (H) 70 - 130 mg/dL   POC Glucose Once    Specimen: Blood   Result Value Ref Range    Glucose 292 (H) 70 - 130 mg/dL   POC Glucose Once    Specimen: Blood   Result Value Ref Range    Glucose 225 (H) 70 - 130 mg/dL   POC Glucose Once    Specimen: Blood   Result Value Ref Range    Glucose 360 (H) 70 - 130 mg/dL   POC Glucose Once    Specimen: Blood   Result Value Ref Range    Glucose 386 (H) 70 - 130 mg/dL   POC Glucose Once    Specimen: Blood   Result Value Ref Range    Glucose 316 (H) 70 - 130 mg/dL   POC Glucose Once    Specimen: Blood   Result Value Ref Range    Glucose 168 (H) 70 - 130 mg/dL   POC Glucose Once    Specimen: Blood   Result Value Ref Range    Glucose 365 (H) 70 - 130 mg/dL   POC Glucose Once    Specimen: Blood   Result Value Ref Range    Glucose 340 (H) 70 - 130 mg/dL   POC Glucose Once    Specimen: Blood   Result Value Ref Range    Glucose 231 (H) 70 - 130 mg/dL   POC Glucose Once    Specimen: Blood   Result Value Ref Range    Glucose 113 70 - 130 mg/dL   POC Glucose Once    Specimen: Blood   Result Value Ref Range    Glucose 272 (H) 70 - 130 mg/dL   POC Glucose Once    Specimen: Blood   Result Value Ref Range    Glucose 318 (H) 70 - 130 mg/dL   POC Glucose Once    Specimen:  Blood   Result Value Ref Range    Glucose 338 (H) 70 - 130 mg/dL   POC Glucose Once    Specimen: Blood   Result Value Ref Range    Glucose 123 70 - 130 mg/dL   POC Glucose Once    Specimen: Blood   Result Value Ref Range    Glucose 323 (H) 70 - 130 mg/dL   POC Glucose Once    Specimen: Blood   Result Value Ref Range    Glucose 270 (H) 70 - 130 mg/dL   POC Glucose Once    Specimen: Blood   Result Value Ref Range    Glucose 346 (H) 70 - 130 mg/dL     *Note: Due to a large number of results and/or encounters for the requested time period, some results have not been displayed. A complete set of results can be found in Results Review.

## 2023-05-24 ENCOUNTER — TRANSCRIBE ORDERS (OUTPATIENT)
Dept: CARDIOLOGY | Facility: CLINIC | Age: 78
End: 2023-05-24
Payer: MEDICARE

## 2023-05-24 DIAGNOSIS — I35.0 NONRHEUMATIC AORTIC VALVE STENOSIS: Primary | ICD-10-CM

## (undated) DEVICE — STERILE POLYISOPRENE POWDER-FREE SURGICAL GLOVES WITH EMOLLIENT COATING: Brand: PROTEXIS

## (undated) DEVICE — PREP SOL POVIDONE/IODINE BT 4OZ

## (undated) DEVICE — DRSNG SPNG GZ WOVN 8PLY 4X4IN 2PK LF STRL BX/50PK

## (undated) DEVICE — PATIENT RETURN ELECTRODE, SINGLE-USE, CONTACT QUALITY MONITORING, ADULT, WITH 9FT CORD, FOR PATIENTS WEIGING OVER 33LBS. (15KG): Brand: MEGADYNE

## (undated) DEVICE — GLV SURG TRIUMPH LT PF LTX 7 STRL

## (undated) DEVICE — PK MAJ PROC LF 60

## (undated) DEVICE — GLV SURG TRIUMPH LT PF LTX 6.5 STRL

## (undated) DEVICE — GLV SURG SENSICARE PI PF LF 7 GRN STRL

## (undated) DEVICE — CANN SMPL SOFTECH BIFLO ETCO2 A/M 7FT

## (undated) DEVICE — SUT ETHLN 3/0 FS1 663G

## (undated) DEVICE — Device: Brand: DISPOSABLE ELECTROSURGICAL SNARE

## (undated) DEVICE — TRAP SXN POLYP QUICKCATCH LF

## (undated) DEVICE — SHEET,DRAPE,53X77,STERILE: Brand: MEDLINE

## (undated) DEVICE — PENCL ES MEGADINE EZ/CLEAN BUTN W/HOLSTR 10FT

## (undated) DEVICE — SOL IRR NACL 0.9PCT BT 1000ML

## (undated) DEVICE — SPNG GZ WOVN 4X4IN 12PLY 10/BX STRL

## (undated) DEVICE — STERILE POLYISOPRENE POWDER-FREE SURGICAL GLOVES: Brand: PROTEXIS